# Patient Record
Sex: MALE | Race: WHITE | NOT HISPANIC OR LATINO
[De-identification: names, ages, dates, MRNs, and addresses within clinical notes are randomized per-mention and may not be internally consistent; named-entity substitution may affect disease eponyms.]

---

## 2017-12-06 PROBLEM — Z00.00 ENCOUNTER FOR PREVENTIVE HEALTH EXAMINATION: Status: ACTIVE | Noted: 2017-12-06

## 2017-12-14 ENCOUNTER — APPOINTMENT (OUTPATIENT)
Dept: PSYCHIATRY | Facility: CLINIC | Age: 47
End: 2017-12-14
Payer: OTHER GOVERNMENT

## 2017-12-14 DIAGNOSIS — F43.23 ADJUSTMENT DISORDER WITH MIXED ANXIETY AND DEPRESSED MOOD: ICD-10-CM

## 2017-12-14 DIAGNOSIS — F10.99 ALCOHOL USE, UNSPECIFIED WITH UNSPECIFIED ALCOHOL-INDUCED DISORDER: ICD-10-CM

## 2017-12-14 PROCEDURE — 90791 PSYCH DIAGNOSTIC EVALUATION: CPT

## 2022-02-05 ENCOUNTER — OUTPATIENT (OUTPATIENT)
Dept: OUTPATIENT SERVICES | Facility: HOSPITAL | Age: 52
LOS: 1 days | End: 2022-02-05
Payer: OTHER GOVERNMENT

## 2022-02-05 DIAGNOSIS — Z20.828 CONTACT WITH AND (SUSPECTED) EXPOSURE TO OTHER VIRAL COMMUNICABLE DISEASES: ICD-10-CM

## 2022-02-05 LAB — SARS-COV-2 RNA SPEC QL NAA+PROBE: SIGNIFICANT CHANGE UP

## 2022-02-05 PROCEDURE — U0003: CPT

## 2022-02-05 PROCEDURE — U0005: CPT

## 2023-06-12 ENCOUNTER — EMERGENCY (EMERGENCY)
Facility: HOSPITAL | Age: 53
LOS: 1 days | Discharge: HOME | End: 2023-06-12
Attending: STUDENT IN AN ORGANIZED HEALTH CARE EDUCATION/TRAINING PROGRAM | Admitting: STUDENT IN AN ORGANIZED HEALTH CARE EDUCATION/TRAINING PROGRAM
Payer: COMMERCIAL

## 2023-06-12 VITALS
DIASTOLIC BLOOD PRESSURE: 93 MMHG | WEIGHT: 154.98 LBS | TEMPERATURE: 98 F | OXYGEN SATURATION: 95 % | HEART RATE: 123 BPM | RESPIRATION RATE: 18 BRPM | SYSTOLIC BLOOD PRESSURE: 144 MMHG

## 2023-06-12 LAB
ANION GAP SERPL CALC-SCNC: 11 MMOL/L — SIGNIFICANT CHANGE UP (ref 5–17)
APAP SERPL-MCNC: <1 UG/ML — LOW (ref 10–30)
BASOPHILS # BLD AUTO: 0.08 K/UL — SIGNIFICANT CHANGE UP (ref 0–0.2)
BASOPHILS NFR BLD AUTO: 1 % — SIGNIFICANT CHANGE UP (ref 0–2)
BUN SERPL-MCNC: 11 MG/DL — SIGNIFICANT CHANGE UP (ref 7–23)
CALCIUM SERPL-MCNC: 9.1 MG/DL — SIGNIFICANT CHANGE UP (ref 8.4–10.5)
CHLORIDE SERPL-SCNC: 101 MMOL/L — SIGNIFICANT CHANGE UP (ref 96–108)
CO2 SERPL-SCNC: 27 MMOL/L — SIGNIFICANT CHANGE UP (ref 22–31)
CREAT SERPL-MCNC: 0.86 MG/DL — SIGNIFICANT CHANGE UP (ref 0.5–1.3)
EGFR: 105 ML/MIN/1.73M2 — SIGNIFICANT CHANGE UP
EOSINOPHIL # BLD AUTO: 0.04 K/UL — SIGNIFICANT CHANGE UP (ref 0–0.5)
EOSINOPHIL NFR BLD AUTO: 0.5 % — SIGNIFICANT CHANGE UP (ref 0–6)
ETHANOL SERPL-MCNC: 339 MG/DL — HIGH (ref 0–3)
GLUCOSE SERPL-MCNC: 103 MG/DL — HIGH (ref 70–99)
HCT VFR BLD CALC: 43.7 % — SIGNIFICANT CHANGE UP (ref 39–50)
HGB BLD-MCNC: 15 G/DL — SIGNIFICANT CHANGE UP (ref 13–17)
IMM GRANULOCYTES NFR BLD AUTO: 0.3 % — SIGNIFICANT CHANGE UP (ref 0–0.9)
LYMPHOCYTES # BLD AUTO: 2.58 K/UL — SIGNIFICANT CHANGE UP (ref 1–3.3)
LYMPHOCYTES # BLD AUTO: 32.3 % — SIGNIFICANT CHANGE UP (ref 13–44)
MCHC RBC-ENTMCNC: 34.3 GM/DL — SIGNIFICANT CHANGE UP (ref 32–36)
MCHC RBC-ENTMCNC: 34.3 PG — HIGH (ref 27–34)
MCV RBC AUTO: 100 FL — SIGNIFICANT CHANGE UP (ref 80–100)
MONOCYTES # BLD AUTO: 0.35 K/UL — SIGNIFICANT CHANGE UP (ref 0–0.9)
MONOCYTES NFR BLD AUTO: 4.4 % — SIGNIFICANT CHANGE UP (ref 2–14)
NEUTROPHILS # BLD AUTO: 4.91 K/UL — SIGNIFICANT CHANGE UP (ref 1.8–7.4)
NEUTROPHILS NFR BLD AUTO: 61.5 % — SIGNIFICANT CHANGE UP (ref 43–77)
NRBC # BLD: 0 /100 WBCS — SIGNIFICANT CHANGE UP (ref 0–0)
PLATELET # BLD AUTO: 213 K/UL — SIGNIFICANT CHANGE UP (ref 150–400)
POTASSIUM SERPL-MCNC: 3.6 MMOL/L — SIGNIFICANT CHANGE UP (ref 3.5–5.3)
POTASSIUM SERPL-SCNC: 3.6 MMOL/L — SIGNIFICANT CHANGE UP (ref 3.5–5.3)
RBC # BLD: 4.37 M/UL — SIGNIFICANT CHANGE UP (ref 4.2–5.8)
RBC # FLD: 12.9 % — SIGNIFICANT CHANGE UP (ref 10.3–14.5)
SALICYLATES SERPL-MCNC: 2.2 MG/DL — LOW (ref 3–30)
SODIUM SERPL-SCNC: 139 MMOL/L — SIGNIFICANT CHANGE UP (ref 135–145)
WBC # BLD: 7.98 K/UL — SIGNIFICANT CHANGE UP (ref 3.8–10.5)
WBC # FLD AUTO: 7.98 K/UL — SIGNIFICANT CHANGE UP (ref 3.8–10.5)

## 2023-06-12 PROCEDURE — 99285 EMERGENCY DEPT VISIT HI MDM: CPT

## 2023-06-12 PROCEDURE — 93010 ELECTROCARDIOGRAM REPORT: CPT

## 2023-06-12 RX ORDER — HALOPERIDOL DECANOATE 100 MG/ML
5 INJECTION INTRAMUSCULAR ONCE
Refills: 0 | Status: DISCONTINUED | OUTPATIENT
Start: 2023-06-12 | End: 2023-06-12

## 2023-06-12 RX ORDER — SODIUM CHLORIDE 9 MG/ML
1000 INJECTION INTRAMUSCULAR; INTRAVENOUS; SUBCUTANEOUS ONCE
Refills: 0 | Status: COMPLETED | OUTPATIENT
Start: 2023-06-12 | End: 2023-06-12

## 2023-06-12 RX ORDER — HALOPERIDOL DECANOATE 100 MG/ML
5 INJECTION INTRAMUSCULAR ONCE
Refills: 0 | Status: COMPLETED | OUTPATIENT
Start: 2023-06-12 | End: 2023-06-12

## 2023-06-12 RX ADMIN — HALOPERIDOL DECANOATE 5 MILLIGRAM(S): 100 INJECTION INTRAMUSCULAR at 15:23

## 2023-06-12 RX ADMIN — Medication 2 MILLIGRAM(S): at 15:23

## 2023-06-12 RX ADMIN — SODIUM CHLORIDE 1000 MILLILITER(S): 9 INJECTION INTRAMUSCULAR; INTRAVENOUS; SUBCUTANEOUS at 15:55

## 2023-06-12 RX ADMIN — SODIUM CHLORIDE 1000 MILLILITER(S): 9 INJECTION INTRAMUSCULAR; INTRAVENOUS; SUBCUTANEOUS at 16:55

## 2023-06-12 NOTE — ED PROVIDER NOTE - PHYSICAL EXAMINATION
Gen: Well appearing in NAD.  +AOB  ENT: oral mucosa moist   Head: atraumatic  Heart: s1/s2, RRR  Lung: CTA b/l,   Msk: No signs of injury.   Neuro: AAO x3, patient moving all extremity equally, no focal neuro deficits noted  Skin: Normal for race. No bruising   Psych: Pt uncooperative and agitated, wants to the leave the ED

## 2023-06-12 NOTE — ED PROVIDER NOTE - OTHER FREE TEXT FOR MDM DISCUSSED CASE WITH QUESTION
I called pts psychiatrist Paty Nj at [facility] [phone] ext [phone]

## 2023-06-12 NOTE — ED PROVIDER NOTE - PROGRESS NOTE DETAILS
ISRA Hernandez:  ETOH 339.  Pt was informed psych will not see him until his BAL is less than 100. Pts keeps asking for his clothes to leave. He was informed he cannot leave until psych evaluates him, which will be michael few hours based on his BAL. He became agitated and aggressive after learning this requiring sedation spoke with telepsych, will keep untilmorning so that collateral from pt psychiatrist can be obtained to make final disposition. Pt resting calmly now and sleeping, he denies si/hi, will give ativan for tremors TABITHA Reed DO

## 2023-06-12 NOTE — ED PROVIDER NOTE - NSFOLLOWUPINSTRUCTIONS_ED_ALL_ED_FT
Suicidal Feelings: How to Help Yourself  Suicide is when you end your own life. Suicidal ideation includes expressing thoughts about, or a preoccupation with, ending your own life. There are many things you can do to help yourself feel better when struggling with these feelings. Many services and people are available to support you and others who struggle with similar feelings.    If you ever feel like you may hurt yourself or others, or have thoughts about taking your own life, get help right away. To get help:  Go to your nearest emergency department.  Call your local emergency services (081 in the [place]  Call the [facility] services helpline (099 in the [place]  Call or text a suicide hotline to speak with a trained counselor. The following suicide hotlines are available in the [place]:  4-723-194-TALK (1-681.524.1512 or 180 in the [place]  7-205-PPXVDRC (1-278.826.2846).  Text 476497. This is the Crisis Text Line in the [place]  1-640.157.3130. This is a hotline for Faroese speakers.  1-707.365.8316. This is a hotline for TTY users.  2-322-9-U-SID (1-813.541.3646). This is a hotline for lesbian, brown, bisexual, transgender, or questioning youth.  For a list of hotlines in [place], visit suicide.org/hotlines/international/poypaz-kltiqwv-pkfcnoxj.html  Contact a crisis center or a local suicide prevention center. To find a crisis center or suicide prevention center:  Call your local hospital, clinic, community service organization, mental health center, social service provider, or health department. Ask for help with connecting to a crisis center.  For a list of crisis centers in the [place], visit: suicidepreventionlifeline.org  For a list of crisis centers in [place], visit: suicideprevention.ca  How to help yourself feel better  A teen talking with an adult.  Promise yourself that you will not do anything bad or extreme when you have suicidal feelings. Remember the times you have felt hopeful.  Many people have gotten through suicidal thoughts and feelings, and you can too.  If you have had these feelings before, remind yourself that you can get through them again.  Let family, friends, teachers, or counselors know how you are feeling. Do not separate yourself from those who care about you and want to help you.  Talk with someone every day, even if you do not feel like talking to anyone or being with other people.  Face-to-face conversation is best to help them understand your feelings.  Contact a mental health care provider and work with this person regularly.  Make a safety plan that you can follow during a crisis.  Include phone numbers of suicide prevention hotlines, mental health professionals, and trusted friends and family members you can call during an emergency.  Save these numbers on your phone.  If you are thinking of taking a lot of medicine, give your medicine to someone who can give it to you as prescribed.  If you are on antidepressants and are concerned you will overdose, tell your health care provider so that he or she can give you safer medicines.  Try to stick to your routines and follow a schedule every day. Make self-care a priority.  Make a list of realistic goals, and cross them off when you achieve them. Accomplishments can give you a sense of worth.  Wait until you are feeling better before doing things that you find difficult or unpleasant.  Do things that you have always enjoyed to take your mind off your feelings.  Try reading a book, or listening to or playing music.  Spending time outside, in nature, may help you feel better.  Follow these instructions at home:  A sign asking the reader not to drink beer, wine, or hard liquor.   Visit your primary health care provider every year for a physical and a mental health checkup.  Take over-the-counter and prescription medicines only as told by your health care provider.  Ask your health care provider about the possible side effects of any medicines you are taking.  Ask your health care provider about whether suicidal ideation is a possible side effect of any of your medicines.  Learn about suicidal ideation and what increases the risk for the development of suicidal thoughts.  Eat a well-balanced diet, and eat regular meals.  Get plenty of rest.  Exercise if you are able. Just 30 minutes of exercise each day can help you feel better.  Keep your living space well lit.  Do not use alcohol or drugs. Remove these substances from your home.  General recommendations  Remove weapons, poisons, knives, and other deadly items from your home.  Work with a mental health care provider as needed.  When you are feeling well, write yourself a letter with tips and support that you can read when you are not feeling well.  Remember that life's difficulties can be sorted out with help. Conditions can be treated, and you can learn behaviors and ways of thinking that will help you.  Work with your health care provider or counselor to learn ways of coping with your thoughts and feelings.  Where to find more information  National Suicide Prevention Lifeline: www.suicidepreventionlifeline.org  Hopeline: www.hopeline.[employer] [facility] Directions  American Foundation for Suicide Prevention: www.afsp.org  The Sid Project (for lesbian, brown, bisexual, transgender, or questioning youth): www.thetrevorproject.org  National [place] of Mental Health: www.nimh.nih.gov/health/topics/suicide-prevention  Suicide Prevention Resources: afsp.org/suicide-prevention-resources  Contact a health care provider if:  You feel as though you are a burden to others.  You feel agitated, angry, vengeful, or have extreme mood swings.  You have withdrawn from family and friends.  You are frequently using drugs or alcohol.  Get help right away if:  You are talking about suicide or wishing to die.  You start making plans for how to commit suicide.  You feel that you have no reason to live.  You start making plans for putting your affairs in order, saying goodbye, or giving your possessions away.  You feel guilt, shame, or unbearable pain, and it seems like there is no way out.  You are engaging in risky behaviors that could lead to death.  If you have any of these thoughts or symptoms, get help right away:  Go to your nearest emergency department or crisis center.  Call emergency services (911 in the [place]  Call or text a suicide crisis helpline.  Summary  Suicide is when you take your own life. Suicidal feelings are thoughts about ending your own life.  Promise yourself that you will not do anything bad or extreme when you have suicidal feelings.  Let family, friends, teachers, or counselors know how you are feeling.  Get help right away if you start making plans for how to commit suicide.  This information is not intended to replace advice given to you by your health care provider. Make sure you discuss any questions you have with your health care provider.

## 2023-06-12 NOTE — ED PROVIDER NOTE - NS ED ATTENDING STATEMENT MOD
This was a shared visit with the MARTINE. I reviewed and verified the documentation and independently performed the documented:

## 2023-06-12 NOTE — ED ADULT NURSE REASSESSMENT NOTE - NS ED NURSE REASSESS COMMENT FT1
Patient undress and placed in gown all belongings placed in the belongings closet. Patient on 1:1. Valuables collected and given to security in envelope # [id number].

## 2023-06-12 NOTE — ED ADULT NURSE NOTE - OBJECTIVE STATEMENT
Patient BIBEMS from home for possible SI. As per PD, patients psychiatrist called for a safety concern. Patient denies SI/HI, visual, auditory ad tactile hallucinations. Patient states "im confused the police came into my house and brought me here I want to leave".

## 2023-06-12 NOTE — ED PROVIDER NOTE - PATIENT PORTAL LINK FT
You can access the FollowMyHealth Patient Portal offered by [facility] by registering at the following website: [email or web address]. By joining [employer]’s FollowMyHealth portal, you will also be able to view your health information using other applications (apps) compatible with our system.
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
## 2023-06-12 NOTE — ED ADULT NURSE REASSESSMENT NOTE - NS ED NURSE REASSESS COMMENT FT1
Spoke with patient's girlfriend (Migdalia) in ED waiting room. Girlfriend upset about events that happened with PD. ER phone number provided to Migdalia.

## 2023-06-12 NOTE — ED ADULT TRIAGE NOTE - CHIEF COMPLAINT QUOTE
Pt BIBA from home for possible SI. As per PD patient's psychiatrist called for concern safety of pt. Pt denies any SI/HI at this time.

## 2023-06-12 NOTE — ED PROVIDER NOTE - OBJECTIVE STATEMENT
52-year-old male with past medical history of anxiety and depression, ETOH abuse Was brought in by EMS and [facility] police for possible suicidal ideation.  Per EMS patient's psychiatrist called the police for safety concerns.  Patient reports he was at his girlfriend's house when the police showed up and brought him in. He denies any SI, HI or all other medical complaints.  Reports this is the second time his psychiatrist called the police on him.  He follows with psychiatrist Kathi Nj at the [facility].

## 2023-06-12 NOTE — ED ADULT NURSE REASSESSMENT NOTE - NS ED NURSE REASSESS COMMENT FT1
Pt's girlfriend Migdalia called to speak with patient. Pt sleeping at this time. Girlfriend made aware. Migdalia's phone number: [phone]. Migdalia [facility] hospital personnel may contact her at any time for collateral information regarding patient and updates.

## 2023-06-13 VITALS
TEMPERATURE: 99 F | RESPIRATION RATE: 18 BRPM | OXYGEN SATURATION: 98 % | SYSTOLIC BLOOD PRESSURE: 157 MMHG | HEART RATE: 97 BPM | DIASTOLIC BLOOD PRESSURE: 88 MMHG

## 2023-06-13 DIAGNOSIS — F10.90 ALCOHOL USE, UNSPECIFIED, UNCOMPLICATED: ICD-10-CM

## 2023-06-13 PROCEDURE — 96372 THER/PROPH/DIAG INJ SC/IM: CPT | Mod: XU

## 2023-06-13 PROCEDURE — 80048 BASIC METABOLIC PNL TOTAL CA: CPT

## 2023-06-13 PROCEDURE — 99285 EMERGENCY DEPT VISIT HI MDM: CPT | Mod: 25

## 2023-06-13 PROCEDURE — 85025 COMPLETE CBC W/AUTO DIFF WBC: CPT

## 2023-06-13 PROCEDURE — 93005 ELECTROCARDIOGRAM TRACING: CPT

## 2023-06-13 PROCEDURE — 36415 COLL VENOUS BLD VENIPUNCTURE: CPT

## 2023-06-13 PROCEDURE — 80307 DRUG TEST PRSMV CHEM ANLYZR: CPT

## 2023-06-13 PROCEDURE — 96360 HYDRATION IV INFUSION INIT: CPT

## 2023-06-13 PROCEDURE — 90792 PSYCH DIAG EVAL W/MED SRVCS: CPT | Mod: 95

## 2023-06-13 RX ADMIN — Medication 2 MILLIGRAM(S): at 05:50

## 2023-06-13 NOTE — ED BEHAVIORAL HEALTH ASSESSMENT NOTE - RISK ASSESSMENT
risk factors: substance use, psychosocial stressors, limited social supports, impulsivity  protective: no past self-harm, engaged in care, employed, motivated to live
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
## 2023-06-13 NOTE — ED BEHAVIORAL HEALTH NOTE - BEHAVIORAL HEALTH NOTE
========================    FOR EACH COLLATERAL    ========================    NAME: unknown     NUMBER: [phone]    RELATIONSHIP: girlfriend     RELIABILITY: unknown     COMMENTS: BTCM attempted to reach collateral via number above as per the  ED - phone line not in service.

## 2023-06-13 NOTE — ED BEHAVIORAL HEALTH ASSESSMENT NOTE - SUMMARY
The patient is a 51 yo male, domiciled alone, employed, a  vet ([id number]), with three children and undergoing ongoing custody battles, no PMH, psych hx of alcohol use disorder, one prior hospitalization for 5 days at [facility] two weeks ago, hx of inpatient rehab, no known suicide attempts, BIB EMS reportedly activated by his outpatient PsyD provider Doreen Nj at [facility] (as per EMS report), due to concern of possible SI.    While patient is denying all symptoms, adamantly denying SI and reporting he does not even recall what he may have said to his provider in the context of intoxication, and on exam hs appearance/affect are congruent with his answers, given that this is the second time recently his provider activated 911 and we do not have sufficient details of what prompted the concern, collateral is needed before we can determine disposition of patient.    Plan:  - hold in ED for collateral from outpatient psych provider Doreen Nj at [facility]  - etoh withdrawal management as per ED  - for acute/severe agitation, Haldol 5mg + Ativan 2mg + Benadryl 50mg q6h PRN
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
## 2023-06-13 NOTE — ED ADULT NURSE REASSESSMENT NOTE - NS ED NURSE REASSESS COMMENT FT1
Belongings given to patient at time of discharge. Pt also provided with 2 sandwiches and can of soda prior to departure.

## 2023-06-13 NOTE — ED BEHAVIORAL HEALTH NOTE - BEHAVIORAL HEALTH NOTE
===================     PRE-HOSPITAL COURSE     ===================     SOURCE: RN and [employer] ED documentation      DETAILS: BIB EMS & PD     ============     ED COURSE     ============     SOURCE: RN and [employer] ED documentation      ARRIVAL: BIB EMS & PD     BELONGINGS: No belongings of note. All belongings were provided to hospital security, and patient currently in a gown with a 1:1 staff member.     BEHAVIOR:  Per MD pt has been sleeping. Per chart, patient's psychiatrist called the police for safety concerns.  Per chart, Patient reports he was at his girlfriend's house when the police showed up and brought him in. Per chart, pt denies any SI, HI or all other medical complaints.     TREATMENT: No medications given in the ED. RN reports pt was given Ketamine 250 mg IV by EMS     VISITORS: No one at bedside

## 2023-06-13 NOTE — CHART NOTE - NSCHARTNOTEFT_GEN_A_CORE
SW met with pt as requested by MD and Telepsych regarding patient interest in substance abuse treatment. SW offered assistance in providing resources and/or referrals to outpatient and/or inpatient treatment programs, however, pt declined assistance with care coordination to treatment providers for his substance abuse and/or denied resources offered and discussed by ZHAO. Patient reported he wants to resume attending his adult treatment program 1x week at the [facility] and will continue to see his therapist weekly. No further SW needs identified at this time, ZHAO advised pt to f/u with SW if he is interested in any community resources.

## 2023-06-13 NOTE — ED BEHAVIORAL HEALTH ASSESSMENT NOTE - NSSUICPROTFACT_PSY_ALL_CORE
Responsibility to children, family, or others/Identifies reasons for living/Cultural, spiritual and/or moral attitudes against suicide/Positive therapeutic relationships

## 2023-06-13 NOTE — ED BEHAVIORAL HEALTH NOTE - BEHAVIORAL HEALTH NOTE
Chart reviewed, received signout, patient reassessed   - overnight received ativan 2mg IV at 550am for withdrawal    Collateral obtained from girlfrienedward Negron at [phone] - she has known patient for 5 years, dating for the last year, states that he has never tried to hurt or kill himself or anyone else, has never said anything about suicide to her. She states that he does have a problem with alcohol that he is aware of and working on, that he can drink 6-8 24oz beers per day that ebbs and flows, denies other drugs. She states that she was with him when he called his psychologist yesterday, said that he expressed his frustration that he had to wait for 7 hours for a program that she had referred him to, denies that she heard him say anything about suicide, states that he was just watching tv when the PD came. She states that she has zero safety concerns about him and that she thinks he is safe to leave the ED.    On interview today patient states that he feels a little shaky from withdrawal but denies any suicide ideation, denies that he would ever kill himself, did not want psychiatric admission but stated that he wanted detox, was able to safety plan. States that he drinks between 48-96oz of beer per day, denies prior seizures. States that he speaks with his therapist weekly. States that he thinks he expressed his frustration to his therapist yesterday and that is why he is here.     MSE: - age appearing male sitting up in bed, no acute distress, fair eye contact, no signs of internal preoccupation or abnormal movements, fair eye contact. Speech prosodic, normal volume, non pressured. Mood "a little shaky" affect congruent, restricted range, predominantly fatigued. Denies suicide ideation, no HI, no auditory, visual, or tactile hallucinations, no paranoia, linear thought process. Alert, AAOx4    A/P   52M,  vet , living alone, has children ages 1, 7, 12, w ongoing custody trejo, works as graphics technician, psych hx of etoh use disorder, one prior hosp x 5 days at [facility] 2 weeks, ago, hx of inpatient rehab, no known SA, now BIBEMS activated by psychiatrist for SI,  on arrival. Patient has consistently denied suicide ideation throughout his stay and collateral also denies that he has ever said anything about suicide or that he has ever tried to hurt or kill himself. He does admit to problematic alcohol use (girlfriend corroborates this) and wants help in the form of detox, and given degree of etoh intake it would appear that this would be a more appropriate treatment intervention than involuntary psychiatric hospitalization at this time, and would not meet criteria for involuntary psychiatric admission as he does not appear to be an imminent threat to himself or anyone else. As such he is psychiatrically cleared for discharge      - Psychiatrically cleared   - Appreciate  help in detox referral Chart reviewed, received signout, patient reassessed   - overnight received ativan 2mg IV at 550am for withdrawal    Collateral obtained from girlfrienedward Negron at [phone] - she has known patient for 5 years, dating for the last year, states that he has never tried to hurt or kill himself or anyone else, has never said anything about suicide to her. She states that he does have a problem with alcohol that he is aware of and working on, that he can drink 6-8 24oz beers per day that ebbs and flows, denies other drugs. She states that she was with him when he called his psychologist yesterday, said that he expressed his frustration that he had to wait for 7 hours for a program that she had referred him to, denies that she heard him say anything about suicide, states that he was just watching tv when the PD came. She states that she has zero safety concerns about him and that she thinks he is safe to leave the ED.    On interview today patient states that he feels a little shaky from withdrawal but denies any suicide ideation, denies that he would ever kill himself, did not want psychiatric admission but stated that he wanted detox, was able to safety plan. States that he drinks between 48-96oz of beer per day, denies prior seizures. States that he speaks with his therapist weekly. States that he thinks he expressed his frustration to his therapist yesterday and that is why he is here.     MSE: - age appearing male sitting up in bed, no acute distress, fair eye contact, no signs of internal preoccupation or abnormal movements, fair eye contact. Speech prosodic, normal volume, non pressured. Mood "a little shaky" affect congruent, restricted range, predominantly fatigued. Denies suicide ideation, no HI, no auditory, visual, or tactile hallucinations, no paranoia, linear thought process. 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 To be clear, patient has several risk factors for suicide that place him at elevated risk (etoh abuse, stress from custody battles, age and gender). However, patient has consistently denied suicide ideation throughout his stay and collateral also denies that he has ever said anything about suicide or that he has ever tried to hurt or kill himself. He does admit to problematic alcohol use (girlfriend corroborates this) and wants help in the form of detox, and given degree of etoh intake it would appear that this would be a more appropriate treatment intervention than involuntary psychiatric hospitalization at this time, and would not meet criteria for involuntary psychiatric admission as he does not appear to be an imminent threat to himself or anyone else. As such he is psychiatrically cleared for discharge    Suicide risk assessment: risk factors include etoh abuse, , white male, , stress from ongoing custody trejo. Protective factors include lack of prior attempts, lack of current suicide ideation, lack of psychosis, stable housing, employment, lack of access to firearms (per chart and gf these were removed recently). Overall he remains at chronically elevated risk of suicide but this risk is mitigated by the aforementioned protective factors and they therefore do not appear to be at imminently increased risk of suicide. Furthermore alcohol appears to be a primer  of his pathology and as such he would not be appropriate for inpatient psychiatric unit. He is therefore appropriate for discharge..       - Psychiatrically cleared   - Appreciate SW help in detox referral

## 2023-09-25 ENCOUNTER — APPOINTMENT (OUTPATIENT)
Dept: NEUROLOGY | Facility: CLINIC | Age: 53
End: 2023-09-25
Payer: OTHER GOVERNMENT

## 2023-09-25 VITALS
HEART RATE: 99 BPM | WEIGHT: 137 LBS | BODY MASS INDEX: 19.61 KG/M2 | SYSTOLIC BLOOD PRESSURE: 114 MMHG | HEIGHT: 70 IN | DIASTOLIC BLOOD PRESSURE: 82 MMHG

## 2023-09-25 DIAGNOSIS — M21.372 FOOT DROP, LEFT FOOT: ICD-10-CM

## 2023-09-25 PROCEDURE — 99205 OFFICE O/P NEW HI 60 MIN: CPT

## 2023-10-04 ENCOUNTER — APPOINTMENT (OUTPATIENT)
Dept: NEUROLOGY | Facility: CLINIC | Age: 53
End: 2023-10-04
Payer: OTHER GOVERNMENT

## 2023-10-04 DIAGNOSIS — G57.30 LESION OF LATERAL POPLITEAL NERVE, UNSPECIFIED LOWER LIMB: ICD-10-CM

## 2023-10-04 DIAGNOSIS — G62.9 POLYNEUROPATHY, UNSPECIFIED: ICD-10-CM

## 2023-10-04 PROCEDURE — 95910 NRV CNDJ TEST 7-8 STUDIES: CPT

## 2023-10-04 PROCEDURE — 95886 MUSC TEST DONE W/N TEST COMP: CPT

## 2023-10-05 PROBLEM — G57.30: Status: ACTIVE | Noted: 2023-10-05

## 2023-10-05 PROBLEM — G62.9 POLYNEUROPATHY: Status: ACTIVE | Noted: 2023-10-05

## 2023-10-23 ENCOUNTER — APPOINTMENT (OUTPATIENT)
Dept: NEUROLOGY | Facility: CLINIC | Age: 53
End: 2023-10-23

## 2023-12-02 ENCOUNTER — NON-APPOINTMENT (OUTPATIENT)
Age: 53
End: 2023-12-02

## 2023-12-12 ENCOUNTER — INPATIENT (INPATIENT)
Facility: HOSPITAL | Age: 53
LOS: 1 days | Discharge: LEFT BEFORE DISCHARGE | DRG: 894 | End: 2023-12-14
Attending: INTERNAL MEDICINE | Admitting: INTERNAL MEDICINE
Payer: MEDICAID

## 2023-12-12 VITALS
HEART RATE: 126 BPM | HEIGHT: 71 IN | OXYGEN SATURATION: 96 % | TEMPERATURE: 97 F | WEIGHT: 136.91 LBS | SYSTOLIC BLOOD PRESSURE: 122 MMHG | RESPIRATION RATE: 15 BRPM | DIASTOLIC BLOOD PRESSURE: 81 MMHG

## 2023-12-12 DIAGNOSIS — F10.931 ALCOHOL USE, UNSPECIFIED WITH WITHDRAWAL DELIRIUM: ICD-10-CM

## 2023-12-12 LAB
ANION GAP SERPL CALC-SCNC: 13 MMOL/L — SIGNIFICANT CHANGE UP (ref 5–17)
ANION GAP SERPL CALC-SCNC: 13 MMOL/L — SIGNIFICANT CHANGE UP (ref 5–17)
BASE EXCESS BLDA CALC-SCNC: 3.3 MMOL/L — HIGH (ref -2–3)
BASE EXCESS BLDA CALC-SCNC: 3.3 MMOL/L — HIGH (ref -2–3)
BASOPHILS # BLD AUTO: 0.05 K/UL — SIGNIFICANT CHANGE UP (ref 0–0.2)
BASOPHILS # BLD AUTO: 0.05 K/UL — SIGNIFICANT CHANGE UP (ref 0–0.2)
BASOPHILS NFR BLD AUTO: 1.3 % — SIGNIFICANT CHANGE UP (ref 0–2)
BASOPHILS NFR BLD AUTO: 1.3 % — SIGNIFICANT CHANGE UP (ref 0–2)
BLOOD GAS COMMENTS ARTERIAL: SIGNIFICANT CHANGE UP
BLOOD GAS COMMENTS ARTERIAL: SIGNIFICANT CHANGE UP
BUN SERPL-MCNC: 6 MG/DL — LOW (ref 7–23)
BUN SERPL-MCNC: 6 MG/DL — LOW (ref 7–23)
CALCIUM SERPL-MCNC: 9.3 MG/DL — SIGNIFICANT CHANGE UP (ref 8.4–10.5)
CALCIUM SERPL-MCNC: 9.3 MG/DL — SIGNIFICANT CHANGE UP (ref 8.4–10.5)
CHLORIDE SERPL-SCNC: 101 MMOL/L — SIGNIFICANT CHANGE UP (ref 96–108)
CHLORIDE SERPL-SCNC: 101 MMOL/L — SIGNIFICANT CHANGE UP (ref 96–108)
CO2 BLDA-SCNC: 28 MMOL/L — HIGH (ref 19–24)
CO2 BLDA-SCNC: 28 MMOL/L — HIGH (ref 19–24)
CO2 SERPL-SCNC: 27 MMOL/L — SIGNIFICANT CHANGE UP (ref 22–31)
CO2 SERPL-SCNC: 27 MMOL/L — SIGNIFICANT CHANGE UP (ref 22–31)
CREAT SERPL-MCNC: 0.6 MG/DL — SIGNIFICANT CHANGE UP (ref 0.5–1.3)
CREAT SERPL-MCNC: 0.6 MG/DL — SIGNIFICANT CHANGE UP (ref 0.5–1.3)
EGFR: 116 ML/MIN/1.73M2 — SIGNIFICANT CHANGE UP
EGFR: 116 ML/MIN/1.73M2 — SIGNIFICANT CHANGE UP
EOSINOPHIL # BLD AUTO: 0.07 K/UL — SIGNIFICANT CHANGE UP (ref 0–0.5)
EOSINOPHIL # BLD AUTO: 0.07 K/UL — SIGNIFICANT CHANGE UP (ref 0–0.5)
EOSINOPHIL NFR BLD AUTO: 1.9 % — SIGNIFICANT CHANGE UP (ref 0–6)
EOSINOPHIL NFR BLD AUTO: 1.9 % — SIGNIFICANT CHANGE UP (ref 0–6)
ETHANOL SERPL-MCNC: 206 MG/DL — HIGH (ref 0–3)
ETHANOL SERPL-MCNC: 206 MG/DL — HIGH (ref 0–3)
GAS PNL BLDA: SIGNIFICANT CHANGE UP
GAS PNL BLDA: SIGNIFICANT CHANGE UP
GLUCOSE SERPL-MCNC: 147 MG/DL — HIGH (ref 70–99)
GLUCOSE SERPL-MCNC: 147 MG/DL — HIGH (ref 70–99)
HCO3 BLDA-SCNC: 27 MMOL/L — SIGNIFICANT CHANGE UP (ref 21–28)
HCO3 BLDA-SCNC: 27 MMOL/L — SIGNIFICANT CHANGE UP (ref 21–28)
HCT VFR BLD CALC: 40.1 % — SIGNIFICANT CHANGE UP (ref 39–50)
HCT VFR BLD CALC: 40.1 % — SIGNIFICANT CHANGE UP (ref 39–50)
HGB BLD-MCNC: 13.8 G/DL — SIGNIFICANT CHANGE UP (ref 13–17)
HGB BLD-MCNC: 13.8 G/DL — SIGNIFICANT CHANGE UP (ref 13–17)
HOROWITZ INDEX BLDA+IHG-RTO: 21 — SIGNIFICANT CHANGE UP
HOROWITZ INDEX BLDA+IHG-RTO: 21 — SIGNIFICANT CHANGE UP
IMM GRANULOCYTES NFR BLD AUTO: 0.3 % — SIGNIFICANT CHANGE UP (ref 0–0.9)
IMM GRANULOCYTES NFR BLD AUTO: 0.3 % — SIGNIFICANT CHANGE UP (ref 0–0.9)
LYMPHOCYTES # BLD AUTO: 1.11 K/UL — SIGNIFICANT CHANGE UP (ref 1–3.3)
LYMPHOCYTES # BLD AUTO: 1.11 K/UL — SIGNIFICANT CHANGE UP (ref 1–3.3)
LYMPHOCYTES # BLD AUTO: 29.5 % — SIGNIFICANT CHANGE UP (ref 13–44)
LYMPHOCYTES # BLD AUTO: 29.5 % — SIGNIFICANT CHANGE UP (ref 13–44)
MCHC RBC-ENTMCNC: 34.4 GM/DL — SIGNIFICANT CHANGE UP (ref 32–36)
MCHC RBC-ENTMCNC: 34.4 GM/DL — SIGNIFICANT CHANGE UP (ref 32–36)
MCHC RBC-ENTMCNC: 34.7 PG — HIGH (ref 27–34)
MCHC RBC-ENTMCNC: 34.7 PG — HIGH (ref 27–34)
MCV RBC AUTO: 100.8 FL — HIGH (ref 80–100)
MCV RBC AUTO: 100.8 FL — HIGH (ref 80–100)
MONOCYTES # BLD AUTO: 0.29 K/UL — SIGNIFICANT CHANGE UP (ref 0–0.9)
MONOCYTES # BLD AUTO: 0.29 K/UL — SIGNIFICANT CHANGE UP (ref 0–0.9)
MONOCYTES NFR BLD AUTO: 7.7 % — SIGNIFICANT CHANGE UP (ref 2–14)
MONOCYTES NFR BLD AUTO: 7.7 % — SIGNIFICANT CHANGE UP (ref 2–14)
NEUTROPHILS # BLD AUTO: 2.23 K/UL — SIGNIFICANT CHANGE UP (ref 1.8–7.4)
NEUTROPHILS # BLD AUTO: 2.23 K/UL — SIGNIFICANT CHANGE UP (ref 1.8–7.4)
NEUTROPHILS NFR BLD AUTO: 59.3 % — SIGNIFICANT CHANGE UP (ref 43–77)
NEUTROPHILS NFR BLD AUTO: 59.3 % — SIGNIFICANT CHANGE UP (ref 43–77)
NRBC # BLD: 0 /100 WBCS — SIGNIFICANT CHANGE UP (ref 0–0)
NRBC # BLD: 0 /100 WBCS — SIGNIFICANT CHANGE UP (ref 0–0)
PCO2 BLDA: 36 MMHG — SIGNIFICANT CHANGE UP (ref 35–48)
PCO2 BLDA: 36 MMHG — SIGNIFICANT CHANGE UP (ref 35–48)
PH BLDA: 7.48 — HIGH (ref 7.35–7.45)
PH BLDA: 7.48 — HIGH (ref 7.35–7.45)
PLATELET # BLD AUTO: 100 K/UL — LOW (ref 150–400)
PLATELET # BLD AUTO: 100 K/UL — LOW (ref 150–400)
PO2 BLDA: 92 MMHG — SIGNIFICANT CHANGE UP (ref 83–108)
PO2 BLDA: 92 MMHG — SIGNIFICANT CHANGE UP (ref 83–108)
POTASSIUM SERPL-MCNC: 3.6 MMOL/L — SIGNIFICANT CHANGE UP (ref 3.5–5.3)
POTASSIUM SERPL-MCNC: 3.6 MMOL/L — SIGNIFICANT CHANGE UP (ref 3.5–5.3)
POTASSIUM SERPL-SCNC: 3.6 MMOL/L — SIGNIFICANT CHANGE UP (ref 3.5–5.3)
POTASSIUM SERPL-SCNC: 3.6 MMOL/L — SIGNIFICANT CHANGE UP (ref 3.5–5.3)
RBC # BLD: 3.98 M/UL — LOW (ref 4.2–5.8)
RBC # BLD: 3.98 M/UL — LOW (ref 4.2–5.8)
RBC # FLD: 12.4 % — SIGNIFICANT CHANGE UP (ref 10.3–14.5)
RBC # FLD: 12.4 % — SIGNIFICANT CHANGE UP (ref 10.3–14.5)
SAO2 % BLDA: 98.8 % — HIGH (ref 94–98)
SAO2 % BLDA: 98.8 % — HIGH (ref 94–98)
SODIUM SERPL-SCNC: 141 MMOL/L — SIGNIFICANT CHANGE UP (ref 135–145)
SODIUM SERPL-SCNC: 141 MMOL/L — SIGNIFICANT CHANGE UP (ref 135–145)
WBC # BLD: 3.76 K/UL — LOW (ref 3.8–10.5)
WBC # BLD: 3.76 K/UL — LOW (ref 3.8–10.5)
WBC # FLD AUTO: 3.76 K/UL — LOW (ref 3.8–10.5)
WBC # FLD AUTO: 3.76 K/UL — LOW (ref 3.8–10.5)

## 2023-12-12 PROCEDURE — 99285 EMERGENCY DEPT VISIT HI MDM: CPT

## 2023-12-12 PROCEDURE — 99223 1ST HOSP IP/OBS HIGH 75: CPT

## 2023-12-12 RX ORDER — ACETAMINOPHEN 500 MG
650 TABLET ORAL EVERY 6 HOURS
Refills: 0 | Status: DISCONTINUED | OUTPATIENT
Start: 2023-12-12 | End: 2023-12-14

## 2023-12-12 RX ORDER — DEXTROSE MONOHYDRATE, SODIUM CHLORIDE, AND POTASSIUM CHLORIDE 50; .745; 4.5 G/1000ML; G/1000ML; G/1000ML
1000 INJECTION, SOLUTION INTRAVENOUS
Refills: 0 | Status: DISCONTINUED | OUTPATIENT
Start: 2023-12-12 | End: 2023-12-13

## 2023-12-12 RX ORDER — LANOLIN ALCOHOL/MO/W.PET/CERES
3 CREAM (GRAM) TOPICAL AT BEDTIME
Refills: 0 | Status: DISCONTINUED | OUTPATIENT
Start: 2023-12-12 | End: 2023-12-14

## 2023-12-12 RX ORDER — MAGNESIUM SULFATE 500 MG/ML
1 VIAL (ML) INJECTION ONCE
Refills: 0 | Status: COMPLETED | OUTPATIENT
Start: 2023-12-12 | End: 2023-12-12

## 2023-12-12 RX ORDER — FOLIC ACID 0.8 MG
1 TABLET ORAL ONCE
Refills: 0 | Status: COMPLETED | OUTPATIENT
Start: 2023-12-12 | End: 2023-12-12

## 2023-12-12 RX ORDER — SODIUM CHLORIDE 9 MG/ML
1000 INJECTION INTRAMUSCULAR; INTRAVENOUS; SUBCUTANEOUS ONCE
Refills: 0 | Status: COMPLETED | OUTPATIENT
Start: 2023-12-12 | End: 2023-12-12

## 2023-12-12 RX ORDER — DIAZEPAM 5 MG
5 TABLET ORAL ONCE
Refills: 0 | Status: DISCONTINUED | OUTPATIENT
Start: 2023-12-12 | End: 2023-12-12

## 2023-12-12 RX ORDER — FOLIC ACID 0.8 MG
1 TABLET ORAL DAILY
Refills: 0 | Status: DISCONTINUED | OUTPATIENT
Start: 2023-12-13 | End: 2023-12-14

## 2023-12-12 RX ORDER — THIAMINE MONONITRATE (VIT B1) 100 MG
100 TABLET ORAL DAILY
Refills: 0 | Status: DISCONTINUED | OUTPATIENT
Start: 2023-12-13 | End: 2023-12-14

## 2023-12-12 RX ORDER — ONDANSETRON 8 MG/1
4 TABLET, FILM COATED ORAL EVERY 8 HOURS
Refills: 0 | Status: DISCONTINUED | OUTPATIENT
Start: 2023-12-12 | End: 2023-12-14

## 2023-12-12 RX ORDER — THIAMINE MONONITRATE (VIT B1) 100 MG
100 TABLET ORAL ONCE
Refills: 0 | Status: COMPLETED | OUTPATIENT
Start: 2023-12-12 | End: 2023-12-12

## 2023-12-12 RX ADMIN — Medication 100 GRAM(S): at 16:04

## 2023-12-12 RX ADMIN — Medication 2 MILLIGRAM(S): at 18:37

## 2023-12-12 RX ADMIN — Medication 5 MILLIGRAM(S): at 16:07

## 2023-12-12 RX ADMIN — Medication 100 MILLIGRAM(S): at 16:18

## 2023-12-12 RX ADMIN — Medication 1 MILLIGRAM(S): at 16:34

## 2023-12-12 RX ADMIN — Medication 2 MILLIGRAM(S): at 22:23

## 2023-12-12 RX ADMIN — Medication 50 MILLIGRAM(S): at 20:50

## 2023-12-12 RX ADMIN — SODIUM CHLORIDE 1000 MILLILITER(S): 9 INJECTION INTRAMUSCULAR; INTRAVENOUS; SUBCUTANEOUS at 16:04

## 2023-12-12 NOTE — ED ADULT NURSE NOTE - CHIEF COMPLAINT QUOTE
Care Transitions Outreach Attempt    Call within 2 business days of discharge: Yes   Attempted to reach patient for transitions of care follow up. Unable to reach patient. Left hipaa compliant message updating pt to follow up with pcp regarding anything further. Ctn info given and will sign off     Patient: Kelly Armas Patient : 1937 MRN: [id number]    Last Discharge [address]       Date Complaint Diagnosis Description Type Department Provider    10/7/22 Dizziness Dizziness . .. ED to Hosp-Admission (Discharged) (ADMITTED) [facility], DO; Mary Lawson. .. Was this an external facility discharge?  No Discharge Facility: [facility] 10/7-10/14/222 DELIRIUM    Noted following upcoming appointments from discharge chart review:   [facility] Shiv Rivera follow up appointment(s):   Future Appointments   Date Time Provider Tay Singh   2022  8:40 AM BETINA Rock - CNS [facility] Neuro Neurology -   [facility]2022 10[facility]     [facility] follow up appointment(s): na
states he is here for alcohol detox, last drink at 5am

## 2023-12-12 NOTE — ED ADULT NURSE NOTE - NSFALLUNIVINTERV_ED_ALL_ED
Bed/Stretcher in lowest position, wheels locked, appropriate side rails in place/Call bell, personal items and telephone in reach/Instruct patient to call for assistance before getting out of bed/chair/stretcher/Non-slip footwear applied when patient is off stretcher/[place] to call system/Physically safe environment - no spills, clutter or unnecessary equipment/Purposeful proactive rounding/Room/bathroom lighting operational, light cord in reach Bed/Stretcher in lowest position, wheels locked, appropriate side rails in place/Call bell, personal items and telephone in reach/Instruct patient to call for assistance before getting out of bed/chair/stretcher/Non-slip footwear applied when patient is off stretcher/[place] to call system/Physically safe environment - no spills, clutter or unnecessary equipment/Purposeful proactive rounding/Room/bathroom lighting operational, light cord in reach

## 2023-12-12 NOTE — ED PROVIDER NOTE - PHYSICAL EXAMINATION
General:     NAD, well-nourished, well-appearing  Head:     NC/AT, EOMI, oral mucosa moist  Neck:     trachea midline  Lungs:     CTA b/l, no w/r/r  CVS:     S1S2, RRR, no m/g/r  Abd:     +BS, s/nt/nd, no organomegaly  Ext:    2+ radial and pedal pulses, no c/c/e  Neuro: AAOx3, no sensory/motor deficits  Mildly injected.  Anxious shaking nauseous

## 2023-12-12 NOTE — ED PROVIDER NOTE - CLINICAL SUMMARY MEDICAL DECISION MAKING FREE TEXT BOX
53-year-old male history of alcohol abuse patient stated that he has been drinking every day and he came in for detox patient was shaking and nausea and vomiting and stated that he goes into bad withdrawal  Patient treated with Valium and Ativan thiamine magnesium alcohol level is 206 patient 7 PM CIWA is equal to 10

## 2023-12-12 NOTE — H&P ADULT - ASSESSMENT
alcohol detox:  -states drinks 2-3 beers a day with last drink 5 AM  -Denies any prior hx of seizure/DT's  -ativan per [facility] protocol  -folic acid and thiamine  -librium 50 mg tid   -IV hydration  -seizure precautions    Full code  DVT ppx: AAT  GI ppx: Diet    anticipated LOS 1 day  alcohol detox:  -states drinks 2-3 beers a day with last drink 5 AM  -Denies any prior hx of seizure/DT's  -ativan per [facility] protocol  -folic acid and thiamine  -librium 50 mg tid   -IV hydration  -seizure precautions    Full code  DVT ppx: AAT  GI ppx: Diet    anticipated LOS 1 day

## 2023-12-12 NOTE — H&P ADULT - HISTORY OF PRESENT ILLNESS
54 y/o male with PMH of etoh abuse who presented to ED requesting detox. pt states that he is been admitted 5 times (twice at [facility] and 3 times at [facility]) in last year due to etoh related admission. denies any [rior hx of DT's or withdrawal seizure. admits of drinking 2-3 beers a day with last drink 5 AM today. in ED pt recieved 5 mg PO diazepam and 2 mg PO ativan despite that pt remains agitated hence admission was requested  52 y/o male with PMH of etoh abuse who presented to ED requesting detox. pt states that he is been admitted 5 times (twice at [facility] and 3 times at [facility]) in last year due to etoh related admission. denies any [rior hx of DT's or withdrawal seizure. admits of drinking 2-3 beers a day with last drink 5 AM today. in ED pt recieved 5 mg PO diazepam and 2 mg PO ativan despite that pt remains agitated hence admission was requested

## 2023-12-12 NOTE — H&P ADULT - NSHPPHYSICALEXAM_GEN_ALL_CORE
T(C): 36.8 (12-12-23 @ 16:51), Max: 36.8 (12-12-23 @ 16:51)  HR: 91 (12-12-23 @ 17:37) (91 - 126)  BP: 140/80 (12-12-23 @ 17:37) (122/81 - 156/74)  RR: 15 (12-12-23 @ 17:37) (15 - 16)  SpO2: 96% (12-12-23 @ 17:37) (96% - 98%)  Wt(kg): --Vital Signs Last 24 Hrs  T(C): 36.8 (12 Dec 2023 16:51), Max: 36.8 (12 Dec 2023 16:51)  T(F): 98.3 (12 Dec 2023 16:51), Max: 98.3 (12 Dec 2023 16:51)  HR: 91 (12 Dec 2023 17:37) (91 - 126)  BP: 140/80 (12 Dec 2023 17:37) (122/81 - 156/74)  BP(mean): --  RR: 15 (12 Dec 2023 17:37) (15 - 16)  SpO2: 96% (12 Dec 2023 17:37) (96% - 98%)    Parameters below as of 12 Dec 2023 17:37  Patient On (Oxygen Delivery Method): room air        PHYSICAL EXAM:  GENERAL: NAD  HENT:  Atraumatic, Normocephalic; No tonsillar erythema, exudates, or enlargement; Moist mucous membranes;   EYES: EOMI, PERRLA, conjunctiva and sclera clear, no lid-lag  NECK: Supple, No JVD, Normal thyroid  NERVOUS SYSTEM:  CN II - XII intact; Sensation intact; Motor Strength 5/5 B/L upper and lower extremities  CHEST/LUNG: Clear to percussion bilaterally; No rales, rhonchi, wheezing, or rubs; normal respiratory effort, no intercostal retractions; No pitting edema  HEART: Regular rate and rhythm; No murmurs, rubs, or gallops  ABDOMEN: Soft, Nontender, Nondistended; Bowel sounds present; No HSM  1. head and neck 2. spine, ribs, and pelvis 3. right upper extremity 4. left upper extremity 5. right lower extremity 6. left lower extremity   ROM (pain, crepitation or contracture)  SKIN: No rashes or lesions; normal texture and temperature  PSYCH: Anxious,  Alert & Oriented x 3

## 2023-12-12 NOTE — ED PROVIDER NOTE - OBJECTIVE STATEMENT
53-year-old male history of alcohol abuse patient stated that he has been drinking every day and he came in for detox patient was shaking and nausea and vomiting and stated that he goes into bad withdrawal

## 2023-12-13 VITALS
TEMPERATURE: 99 F | HEART RATE: 126 BPM | DIASTOLIC BLOOD PRESSURE: 90 MMHG | SYSTOLIC BLOOD PRESSURE: 129 MMHG | RESPIRATION RATE: 16 BRPM | OXYGEN SATURATION: 99 %

## 2023-12-13 DIAGNOSIS — D69.6 THROMBOCYTOPENIA, UNSPECIFIED: ICD-10-CM

## 2023-12-13 DIAGNOSIS — D53.9 NUTRITIONAL ANEMIA, UNSPECIFIED: ICD-10-CM

## 2023-12-13 DIAGNOSIS — F10.930 ALCOHOL USE, UNSPECIFIED WITH WITHDRAWAL, UNCOMPLICATED: ICD-10-CM

## 2023-12-13 DIAGNOSIS — Z29.9 ENCOUNTER FOR PROPHYLACTIC MEASURES, UNSPECIFIED: ICD-10-CM

## 2023-12-13 DIAGNOSIS — R74.01 ELEVATION OF LEVELS OF LIVER TRANSAMINASE LEVELS: ICD-10-CM

## 2023-12-13 LAB
ALBUMIN SERPL ELPH-MCNC: 3.6 G/DL — SIGNIFICANT CHANGE UP (ref 3.3–5)
ALBUMIN SERPL ELPH-MCNC: 3.6 G/DL — SIGNIFICANT CHANGE UP (ref 3.3–5)
ALP SERPL-CCNC: 55 U/L — SIGNIFICANT CHANGE UP (ref 40–120)
ALP SERPL-CCNC: 55 U/L — SIGNIFICANT CHANGE UP (ref 40–120)
ALT FLD-CCNC: 84 U/L — HIGH (ref 10–45)
ALT FLD-CCNC: 84 U/L — HIGH (ref 10–45)
ANION GAP SERPL CALC-SCNC: 6 MMOL/L — SIGNIFICANT CHANGE UP (ref 5–17)
ANION GAP SERPL CALC-SCNC: 6 MMOL/L — SIGNIFICANT CHANGE UP (ref 5–17)
APPEARANCE UR: CLEAR — SIGNIFICANT CHANGE UP
APPEARANCE UR: CLEAR — SIGNIFICANT CHANGE UP
AST SERPL-CCNC: 104 U/L — HIGH (ref 10–40)
AST SERPL-CCNC: 104 U/L — HIGH (ref 10–40)
BACTERIA # UR AUTO: NEGATIVE /HPF — SIGNIFICANT CHANGE UP
BACTERIA # UR AUTO: NEGATIVE /HPF — SIGNIFICANT CHANGE UP
BASOPHILS # BLD AUTO: 0.04 K/UL — SIGNIFICANT CHANGE UP (ref 0–0.2)
BASOPHILS # BLD AUTO: 0.04 K/UL — SIGNIFICANT CHANGE UP (ref 0–0.2)
BASOPHILS NFR BLD AUTO: 0.9 % — SIGNIFICANT CHANGE UP (ref 0–2)
BASOPHILS NFR BLD AUTO: 0.9 % — SIGNIFICANT CHANGE UP (ref 0–2)
BILIRUB DIRECT SERPL-MCNC: 0.2 MG/DL — SIGNIFICANT CHANGE UP (ref 0–0.3)
BILIRUB DIRECT SERPL-MCNC: 0.2 MG/DL — SIGNIFICANT CHANGE UP (ref 0–0.3)
BILIRUB INDIRECT FLD-MCNC: 0.8 MG/DL — SIGNIFICANT CHANGE UP (ref 0.2–1)
BILIRUB INDIRECT FLD-MCNC: 0.8 MG/DL — SIGNIFICANT CHANGE UP (ref 0.2–1)
BILIRUB SERPL-MCNC: 1 MG/DL — SIGNIFICANT CHANGE UP (ref 0.2–1.2)
BILIRUB SERPL-MCNC: 1 MG/DL — SIGNIFICANT CHANGE UP (ref 0.2–1.2)
BILIRUB UR-MCNC: NEGATIVE — SIGNIFICANT CHANGE UP
BILIRUB UR-MCNC: NEGATIVE — SIGNIFICANT CHANGE UP
BUN SERPL-MCNC: 9 MG/DL — SIGNIFICANT CHANGE UP (ref 7–23)
BUN SERPL-MCNC: 9 MG/DL — SIGNIFICANT CHANGE UP (ref 7–23)
CALCIUM SERPL-MCNC: 8.5 MG/DL — SIGNIFICANT CHANGE UP (ref 8.4–10.5)
CALCIUM SERPL-MCNC: 8.5 MG/DL — SIGNIFICANT CHANGE UP (ref 8.4–10.5)
CHLORIDE SERPL-SCNC: 101 MMOL/L — SIGNIFICANT CHANGE UP (ref 96–108)
CHLORIDE SERPL-SCNC: 101 MMOL/L — SIGNIFICANT CHANGE UP (ref 96–108)
CO2 SERPL-SCNC: 30 MMOL/L — SIGNIFICANT CHANGE UP (ref 22–31)
CO2 SERPL-SCNC: 30 MMOL/L — SIGNIFICANT CHANGE UP (ref 22–31)
COLOR SPEC: SIGNIFICANT CHANGE UP
COLOR SPEC: SIGNIFICANT CHANGE UP
COMMENT - URINE: SIGNIFICANT CHANGE UP
COMMENT - URINE: SIGNIFICANT CHANGE UP
CREAT SERPL-MCNC: 0.81 MG/DL — SIGNIFICANT CHANGE UP (ref 0.5–1.3)
CREAT SERPL-MCNC: 0.81 MG/DL — SIGNIFICANT CHANGE UP (ref 0.5–1.3)
DIFF PNL FLD: NEGATIVE — SIGNIFICANT CHANGE UP
DIFF PNL FLD: NEGATIVE — SIGNIFICANT CHANGE UP
EGFR: 106 ML/MIN/1.73M2 — SIGNIFICANT CHANGE UP
EGFR: 106 ML/MIN/1.73M2 — SIGNIFICANT CHANGE UP
EOSINOPHIL # BLD AUTO: 0.24 K/UL — SIGNIFICANT CHANGE UP (ref 0–0.5)
EOSINOPHIL # BLD AUTO: 0.24 K/UL — SIGNIFICANT CHANGE UP (ref 0–0.5)
EOSINOPHIL NFR BLD AUTO: 5.6 % — SIGNIFICANT CHANGE UP (ref 0–6)
EOSINOPHIL NFR BLD AUTO: 5.6 % — SIGNIFICANT CHANGE UP (ref 0–6)
EPI CELLS # UR: 1 — SIGNIFICANT CHANGE UP
EPI CELLS # UR: 1 — SIGNIFICANT CHANGE UP
GLUCOSE SERPL-MCNC: 110 MG/DL — HIGH (ref 70–99)
GLUCOSE SERPL-MCNC: 110 MG/DL — HIGH (ref 70–99)
GLUCOSE UR QL: NEGATIVE MG/DL — SIGNIFICANT CHANGE UP
GLUCOSE UR QL: NEGATIVE MG/DL — SIGNIFICANT CHANGE UP
HCT VFR BLD CALC: 36.9 % — LOW (ref 39–50)
HCT VFR BLD CALC: 36.9 % — LOW (ref 39–50)
HGB BLD-MCNC: 12.7 G/DL — LOW (ref 13–17)
HGB BLD-MCNC: 12.7 G/DL — LOW (ref 13–17)
IMM GRANULOCYTES NFR BLD AUTO: 0.2 % — SIGNIFICANT CHANGE UP (ref 0–0.9)
IMM GRANULOCYTES NFR BLD AUTO: 0.2 % — SIGNIFICANT CHANGE UP (ref 0–0.9)
KETONES UR-MCNC: ABNORMAL MG/DL
KETONES UR-MCNC: ABNORMAL MG/DL
LEUKOCYTE ESTERASE UR-ACNC: ABNORMAL
LEUKOCYTE ESTERASE UR-ACNC: ABNORMAL
LYMPHOCYTES # BLD AUTO: 1.4 K/UL — SIGNIFICANT CHANGE UP (ref 1–3.3)
LYMPHOCYTES # BLD AUTO: 1.4 K/UL — SIGNIFICANT CHANGE UP (ref 1–3.3)
LYMPHOCYTES # BLD AUTO: 32.9 % — SIGNIFICANT CHANGE UP (ref 13–44)
LYMPHOCYTES # BLD AUTO: 32.9 % — SIGNIFICANT CHANGE UP (ref 13–44)
MAGNESIUM SERPL-MCNC: 1.6 MG/DL — SIGNIFICANT CHANGE UP (ref 1.6–2.6)
MAGNESIUM SERPL-MCNC: 1.6 MG/DL — SIGNIFICANT CHANGE UP (ref 1.6–2.6)
MCHC RBC-ENTMCNC: 34.4 GM/DL — SIGNIFICANT CHANGE UP (ref 32–36)
MCHC RBC-ENTMCNC: 34.4 GM/DL — SIGNIFICANT CHANGE UP (ref 32–36)
MCHC RBC-ENTMCNC: 34.8 PG — HIGH (ref 27–34)
MCHC RBC-ENTMCNC: 34.8 PG — HIGH (ref 27–34)
MCV RBC AUTO: 101.1 FL — HIGH (ref 80–100)
MCV RBC AUTO: 101.1 FL — HIGH (ref 80–100)
MONOCYTES # BLD AUTO: 0.43 K/UL — SIGNIFICANT CHANGE UP (ref 0–0.9)
MONOCYTES # BLD AUTO: 0.43 K/UL — SIGNIFICANT CHANGE UP (ref 0–0.9)
MONOCYTES NFR BLD AUTO: 10.1 % — SIGNIFICANT CHANGE UP (ref 2–14)
MONOCYTES NFR BLD AUTO: 10.1 % — SIGNIFICANT CHANGE UP (ref 2–14)
NEUTROPHILS # BLD AUTO: 2.13 K/UL — SIGNIFICANT CHANGE UP (ref 1.8–7.4)
NEUTROPHILS # BLD AUTO: 2.13 K/UL — SIGNIFICANT CHANGE UP (ref 1.8–7.4)
NEUTROPHILS NFR BLD AUTO: 50.3 % — SIGNIFICANT CHANGE UP (ref 43–77)
NEUTROPHILS NFR BLD AUTO: 50.3 % — SIGNIFICANT CHANGE UP (ref 43–77)
NITRITE UR-MCNC: NEGATIVE — SIGNIFICANT CHANGE UP
NITRITE UR-MCNC: NEGATIVE — SIGNIFICANT CHANGE UP
NRBC # BLD: 0 /100 WBCS — SIGNIFICANT CHANGE UP (ref 0–0)
NRBC # BLD: 0 /100 WBCS — SIGNIFICANT CHANGE UP (ref 0–0)
PH UR: 8.5 (ref 5–8)
PH UR: 8.5 (ref 5–8)
PHOSPHATE SERPL-MCNC: 2.9 MG/DL — SIGNIFICANT CHANGE UP (ref 2.5–4.5)
PHOSPHATE SERPL-MCNC: 2.9 MG/DL — SIGNIFICANT CHANGE UP (ref 2.5–4.5)
PLATELET # BLD AUTO: 92 K/UL — LOW (ref 150–400)
PLATELET # BLD AUTO: 92 K/UL — LOW (ref 150–400)
POTASSIUM SERPL-MCNC: 3.4 MMOL/L — LOW (ref 3.5–5.3)
POTASSIUM SERPL-MCNC: 3.4 MMOL/L — LOW (ref 3.5–5.3)
POTASSIUM SERPL-SCNC: 3.4 MMOL/L — LOW (ref 3.5–5.3)
POTASSIUM SERPL-SCNC: 3.4 MMOL/L — LOW (ref 3.5–5.3)
PROT SERPL-MCNC: 6.7 G/DL — SIGNIFICANT CHANGE UP (ref 6–8.3)
PROT SERPL-MCNC: 6.7 G/DL — SIGNIFICANT CHANGE UP (ref 6–8.3)
PROT UR-MCNC: SIGNIFICANT CHANGE UP MG/DL
PROT UR-MCNC: SIGNIFICANT CHANGE UP MG/DL
RBC # BLD: 3.65 M/UL — LOW (ref 4.2–5.8)
RBC # BLD: 3.65 M/UL — LOW (ref 4.2–5.8)
RBC # FLD: 12.4 % — SIGNIFICANT CHANGE UP (ref 10.3–14.5)
RBC # FLD: 12.4 % — SIGNIFICANT CHANGE UP (ref 10.3–14.5)
RBC CASTS # UR COMP ASSIST: 0 /HPF — SIGNIFICANT CHANGE UP (ref 0–4)
RBC CASTS # UR COMP ASSIST: 0 /HPF — SIGNIFICANT CHANGE UP (ref 0–4)
SODIUM SERPL-SCNC: 137 MMOL/L — SIGNIFICANT CHANGE UP (ref 135–145)
SODIUM SERPL-SCNC: 137 MMOL/L — SIGNIFICANT CHANGE UP (ref 135–145)
SP GR SPEC: 1.02 — SIGNIFICANT CHANGE UP (ref 1–1.03)
SP GR SPEC: 1.02 — SIGNIFICANT CHANGE UP (ref 1–1.03)
UROBILINOGEN FLD QL: 1 MG/DL — SIGNIFICANT CHANGE UP (ref 0.2–1)
UROBILINOGEN FLD QL: 1 MG/DL — SIGNIFICANT CHANGE UP (ref 0.2–1)
WBC # BLD: 4.25 K/UL — SIGNIFICANT CHANGE UP (ref 3.8–10.5)
WBC # BLD: 4.25 K/UL — SIGNIFICANT CHANGE UP (ref 3.8–10.5)
WBC # FLD AUTO: 4.25 K/UL — SIGNIFICANT CHANGE UP (ref 3.8–10.5)
WBC # FLD AUTO: 4.25 K/UL — SIGNIFICANT CHANGE UP (ref 3.8–10.5)
WBC UR QL: 1 /HPF — SIGNIFICANT CHANGE UP (ref 0–5)
WBC UR QL: 1 /HPF — SIGNIFICANT CHANGE UP (ref 0–5)

## 2023-12-13 PROCEDURE — 99233 SBSQ HOSP IP/OBS HIGH 50: CPT

## 2023-12-13 RX ORDER — INFLUENZA VIRUS VACCINE 15; 15; 15; 15 UG/.5ML; UG/.5ML; UG/.5ML; UG/.5ML
0.5 SUSPENSION INTRAMUSCULAR ONCE
Refills: 0 | Status: DISCONTINUED | OUTPATIENT
Start: 2023-12-13 | End: 2023-12-14

## 2023-12-13 RX ORDER — SODIUM CHLORIDE 9 MG/ML
1000 INJECTION, SOLUTION INTRAVENOUS
Refills: 0 | Status: DISCONTINUED | OUTPATIENT
Start: 2023-12-13 | End: 2023-12-14

## 2023-12-13 RX ADMIN — Medication 50 MILLIGRAM(S): at 05:05

## 2023-12-13 RX ADMIN — Medication 1.5 MILLIGRAM(S): at 17:38

## 2023-12-13 RX ADMIN — Medication 2 MILLIGRAM(S): at 01:59

## 2023-12-13 RX ADMIN — Medication 2 MILLIGRAM(S): at 07:53

## 2023-12-13 RX ADMIN — Medication 50 MILLIGRAM(S): at 13:14

## 2023-12-13 RX ADMIN — Medication 2 MILLIGRAM(S): at 00:47

## 2023-12-13 RX ADMIN — Medication 1.5 MILLIGRAM(S): at 21:14

## 2023-12-13 RX ADMIN — Medication 2 MILLIGRAM(S): at 11:31

## 2023-12-13 RX ADMIN — Medication 1 MILLIGRAM(S): at 11:37

## 2023-12-13 RX ADMIN — SODIUM CHLORIDE 100 MILLILITER(S): 9 INJECTION, SOLUTION INTRAVENOUS at 17:40

## 2023-12-13 RX ADMIN — SODIUM CHLORIDE 100 MILLILITER(S): 9 INJECTION, SOLUTION INTRAVENOUS at 11:32

## 2023-12-13 RX ADMIN — Medication 100 MILLIGRAM(S): at 11:37

## 2023-12-13 RX ADMIN — DEXTROSE MONOHYDRATE, SODIUM CHLORIDE, AND POTASSIUM CHLORIDE 125 MILLILITER(S): 50; .745; 4.5 INJECTION, SOLUTION INTRAVENOUS at 07:53

## 2023-12-13 RX ADMIN — Medication 2 MILLIGRAM(S): at 05:05

## 2023-12-13 NOTE — PROGRESS NOTE ADULT - SUBJECTIVE AND OBJECTIVE BOX
[facility] Medicine  Dr. Nicole Roche  Contact: Available on teams     PROGRESS NOTE:     Patient is a 53y old  Male who presents with a chief complaint of detox (12 Dec 2023 20:00)    SUBJECTIVE / OVERNIGHT EVENTS: Patient seen and examined. Reports last drink was yesterday morning. Now with moderate tremors. Denies any hallucinations or tactile disturbances. Reports multiple prior admissions for detox dating back many years. Denies h/o seizures or ICU admission for DTs. Not forthcoming about his stressors and reason for alcohol abuse. Denies any other concerns at this time. CIWA 2     ADDITIONAL REVIEW OF SYSTEMS:    MEDICATIONS  (STANDING):  chlordiazePOXIDE 50 milliGRAM(s) Oral <User Schedule>  folic acid 1 milliGRAM(s) Oral daily  influenza   Vaccine 0.5 milliLiter(s) IntraMuscular once  lactated ringers. 1000 milliLiter(s) (100 mL/Hr) IV Continuous <Continuous>  thiamine 100 milliGRAM(s) Oral daily    MEDICATIONS  (PRN):  acetaminophen     Tablet .. 650 milliGRAM(s) Oral every 6 hours PRN Temp greater or equal to 38C (100.4F), Mild Pain (1 - 3)  aluminum hydroxide/magnesium hydroxide/simethicone Suspension 30 milliLiter(s) Oral every 4 hours PRN Dyspepsia  LORazepam   Injectable 2 milliGRAM(s) IV Push every 1 hour PRN CIWA-Ar score 8 or greater  melatonin 3 milliGRAM(s) Oral at bedtime PRN Insomnia  ondansetron Injectable 4 milliGRAM(s) IV Push every 8 hours PRN Nausea and/or Vomiting      PHYSICAL EXAM:  Vital Signs Last 24 Hrs  T(C): 36.8 (12 Dec 2023 16:51), Max: 36.8 (12 Dec 2023 16:51)  T(F): 98.3 (12 Dec 2023 16:51), Max: 98.3 (12 Dec 2023 16:51)  HR: 93 (12 Dec 2023 21:22) (91 - 126)  BP: 119/73 (12 Dec 2023 21:22) (119/73 - 156/74)  BP(mean): --  RR: 16 (12 Dec 2023 21:22) (15 - 16)  SpO2: 98% (12 Dec 2023 21:22) (96% - 98%)    Parameters below as of 12 Dec 2023 21:22  Patient On (Oxygen Delivery Method): room air      CONSTITUTIONAL: NAD, sleeping and easily arousable   RESPIRATORY: Normal respiratory effort; lungs are clear to auscultation bilaterally  CARDIOVASCULAR: Regular rate and rhythm, normal S1 and S2, no murmur/rub/gallop; No lower extremity edema; Peripheral pulses are 2+ bilaterally  ABDOMEN: Nontender to palpation, normoactive bowel sounds, no rebound/guarding; No hepatosplenomegaly  MUSCLOSKELETAL: no clubbing or cyanosis of digits; no joint swelling or tenderness to palpation  Neuro - tremors+, mild tongue fasciculations   PSYCH: A+O to person, place, and time; affect appropriate    LABS:                        12.7   4.25  )-----------( 92       ( 13 Dec 2023 06:31 )             36.9         137  |  101  |  9   ----------------------------<  110<H>  3.4<L>   |  30  |  0.81    Ca    8.5      13 Dec 2023 06:31  Phos  2.9       Mg     1.6         TPro  6.7  /  Alb  3.6  /  TBili  1.0  /  DBili  0.2  /  AST  104<H>  /  ALT  84<H>  /  AlkPhos  55        Urinalysis Basic - ( 13 Dec 2023 12:30 )    Color: Dark Yellow / Appearance: Clear / S.019 / pH: x  Gluc: x / Ketone: Trace mg/dL  / Bili: Negative / Urobili: 1.0 mg/dL   Blood: x / Protein: Trace mg/dL / Nitrite: Negative   Leuk Esterase: Trace / RBC: 0 /HPF / WBC 1 /HPF   Sq Epi: x / Non Sq Epi: x / Bacteria: Negative /HPF          RADIOLOGY & ADDITIONAL TESTS:  Results Reviewed:   Imaging Personally Reviewed:  Electrocardiogram Personally Reviewed:    COORDINATION OF CARE:  Care Discussed with Consultants/Other Providers [Y/N]:  Prior or Outpatient Records Reviewed [Y/N]:   [facility] Medicine  Dr. Nicole Roche  Contact: Available on teams     PROGRESS NOTE:     Patient is a 53y old  Male who presents with a chief complaint of detox (12 Dec 2023 20:00)    SUBJECTIVE / OVERNIGHT EVENTS: Patient seen and examined. Reports last drink was yesterday morning. Now with moderate tremors. Denies any hallucinations or tactile disturbances. Reports multiple prior admissions for detox dating back many years. Denies h/o seizures or ICU admission for DTs. Not forthcoming about his stressors and reason for alcohol abuse. Denies any other concerns at this time. CIWA 2     ADDITIONAL REVIEW OF SYSTEMS:    MEDICATIONS  (STANDING):  chlordiazePOXIDE 50 milliGRAM(s) Oral <User Schedule>  folic acid 1 milliGRAM(s) Oral daily  influenza   Vaccine 0.5 milliLiter(s) IntraMuscular once  lactated ringers. 1000 milliLiter(s) (100 mL/Hr) IV Continuous <Continuous>  thiamine 100 milliGRAM(s) Oral daily    MEDICATIONS  (PRN):  acetaminophen     Tablet .. 650 milliGRAM(s) Oral every 6 hours PRN Temp greater or equal to 38C (100.4F), Mild Pain (1 - 3)  aluminum hydroxide/magnesium hydroxide/simethicone Suspension 30 milliLiter(s) Oral every 4 hours PRN Dyspepsia  LORazepam   Injectable 2 milliGRAM(s) IV Push every 1 hour PRN CIWA-Ar score 8 or greater  melatonin 3 milliGRAM(s) Oral at bedtime PRN Insomnia  ondansetron Injectable 4 milliGRAM(s) IV Push every 8 hours PRN Nausea and/or Vomiting      PHYSICAL EXAM:  Vital Signs Last 24 Hrs  T(C): 36.8 (12 Dec 2023 16:51), Max: 36.8 (12 Dec 2023 16:51)  T(F): 98.3 (12 Dec 2023 16:51), Max: 98.3 (12 Dec 2023 16:51)  HR: 93 (12 Dec 2023 21:22) (91 - 126)  BP: 119/73 (12 Dec 2023 21:22) (119/73 - 156/74)  BP(mean): --  RR: 16 (12 Dec 2023 21:22) (15 - 16)  SpO2: 98% (12 Dec 2023 21:22) (96% - 98%)    Parameters below as of 12 Dec 2023 21:22  Patient On (Oxygen Delivery Method): room air      CONSTITUTIONAL: NAD, sleeping and easily arousable   RESPIRATORY: Normal respiratory effort; lungs are clear to auscultation bilaterally  CARDIOVASCULAR: Regular rate and rhythm, normal S1 and S2, no murmur/rub/gallop; No lower extremity edema; Peripheral pulses are 2+ bilaterally  ABDOMEN: Nontender to palpation, normoactive bowel sounds, no rebound/guarding; No hepatosplenomegaly  MUSCLOSKELETAL: no clubbing or cyanosis of digits; no joint swelling or tenderness to palpation  Neuro - tremors+, mild tongue fasciculations   PSYCH: A+O to person, place, and time; affect appropriate    LABS:                        12.7   4.25  )-----------( 92       ( 13 Dec 2023 06:31 )             36.9         137  |  101  |  9   ----------------------------<  110<H>  3.4<L>   |  30  |  0.81    Ca    8.5      13 Dec 2023 06:31  Phos  2.9       Mg     1.6         TPro  6.7  /  Alb  3.6  /  TBili  1.0  /  DBili  0.2  /  AST  104<H>  /  ALT  84<H>  /  AlkPhos  55        Urinalysis Basic - ( 13 Dec 2023 12:30 )    Color: Dark Yellow / Appearance: Clear / S.019 / pH: x  Gluc: x / Ketone: Trace mg/dL  / Bili: Negative / Urobili: 1.0 mg/dL   Blood: x / Protein: Trace mg/dL / Nitrite: Negative   Leuk Esterase: Trace / RBC: 0 /HPF / WBC 1 /HPF   Sq Epi: x / Non Sq Epi: x / Bacteria: Negative /HPF          RADIOLOGY & ADDITIONAL TESTS:  Results Reviewed:   Imaging Personally Reviewed:  Electrocardiogram Personally Reviewed:    COORDINATION OF CARE:  Care Discussed with Consultants/Other Providers [Y/N]:  Prior or Outpatient Records Reviewed [Y/N]:   [facility] Medicine  Dr. Nicole Roche  Contact: Available on teams     PROGRESS NOTE:     Patient is a 53y old  Male who presents with a chief complaint of detox (12 Dec 2023 20:00)    SUBJECTIVE / OVERNIGHT EVENTS: Patient seen and examined. Reports last drink was yesterday morning. Now with moderate tremors and anxiety. Denies any hallucinations or tactile disturbances. Reports multiple prior admissions for detox dating back many years. Denies h/o seizures or ICU admission for DTs. Not forthcoming about his stressors and reason for alcohol abuse. Denies any other concerns at this time.    ADDITIONAL REVIEW OF SYSTEMS:    MEDICATIONS  (STANDING):  chlordiazePOXIDE 50 milliGRAM(s) Oral <User Schedule>  folic acid 1 milliGRAM(s) Oral daily  influenza   Vaccine 0.5 milliLiter(s) IntraMuscular once  lactated ringers. 1000 milliLiter(s) (100 mL/Hr) IV Continuous <Continuous>  thiamine 100 milliGRAM(s) Oral daily    MEDICATIONS  (PRN):  acetaminophen     Tablet .. 650 milliGRAM(s) Oral every 6 hours PRN Temp greater or equal to 38C (100.4F), Mild Pain (1 - 3)  aluminum hydroxide/magnesium hydroxide/simethicone Suspension 30 milliLiter(s) Oral every 4 hours PRN Dyspepsia  LORazepam   Injectable 2 milliGRAM(s) IV Push every 1 hour PRN CIWA-Ar score 8 or greater  melatonin 3 milliGRAM(s) Oral at bedtime PRN Insomnia  ondansetron Injectable 4 milliGRAM(s) IV Push every 8 hours PRN Nausea and/or Vomiting      PHYSICAL EXAM:  Vital Signs Last 24 Hrs  T(C): 36.8 (12 Dec 2023 16:51), Max: 36.8 (12 Dec 2023 16:51)  T(F): 98.3 (12 Dec 2023 16:51), Max: 98.3 (12 Dec 2023 16:51)  HR: 93 (12 Dec 2023 21:22) (91 - 126)  BP: 119/73 (12 Dec 2023 21:22) (119/73 - 156/74)  BP(mean): --  RR: 16 (12 Dec 2023 21:22) (15 - 16)  SpO2: 98% (12 Dec 2023 21:22) (96% - 98%)    Parameters below as of 12 Dec 2023 21:22  Patient On (Oxygen Delivery Method): room air      CONSTITUTIONAL: NAD, sleeping and easily arousable   RESPIRATORY: Normal respiratory effort; lungs are clear to auscultation bilaterally  CARDIOVASCULAR: Regular rate and rhythm, normal S1 and S2, no murmur/rub/gallop; No lower extremity edema; Peripheral pulses are 2+ bilaterally  ABDOMEN: Nontender to palpation, normoactive bowel sounds, no rebound/guarding; No hepatosplenomegaly  MUSCLOSKELETAL: no clubbing or cyanosis of digits; no joint swelling or tenderness to palpation  Neuro - tremors+, mild tongue fasciculations   PSYCH: A+O to person, place, and time; affect appropriate    LABS:                        12.7   4.25  )-----------( 92       ( 13 Dec 2023 06:31 )             36.9         137  |  101  |  9   ----------------------------<  110<H>  3.4<L>   |  30  |  0.81    Ca    8.5      13 Dec 2023 06:31  Phos  2.9       Mg     1.6         TPro  6.7  /  Alb  3.6  /  TBili  1.0  /  DBili  0.2  /  AST  104<H>  /  ALT  84<H>  /  AlkPhos  55        Urinalysis Basic - ( 13 Dec 2023 12:30 )    Color: Dark Yellow / Appearance: Clear / S.019 / pH: x  Gluc: x / Ketone: Trace mg/dL  / Bili: Negative / Urobili: 1.0 mg/dL   Blood: x / Protein: Trace mg/dL / Nitrite: Negative   Leuk Esterase: Trace / RBC: 0 /HPF / WBC 1 /HPF   Sq Epi: x / Non Sq Epi: x / Bacteria: Negative /HPF          RADIOLOGY & ADDITIONAL TESTS:  Results Reviewed:   Imaging Personally Reviewed:  Electrocardiogram Personally Reviewed:    COORDINATION OF CARE:  Care Discussed with Consultants/Other Providers [Y/N]:  Prior or Outpatient Records Reviewed [Y/N]:   [facility] Medicine  Dr. Nicole Roche  Contact: Available on teams     PROGRESS NOTE:     Patient is a 53y old  Male who presents with a chief complaint of detox (12 Dec 2023 20:00)    SUBJECTIVE / OVERNIGHT EVENTS: Patient seen and examined. Reports last drink was yesterday morning. Now with moderate tremors and anxiety. Denies any hallucinations or tactile disturbances. Reports multiple prior admissions for detox dating back many years. Denies h/o seizures or ICU admission for DTs. Not forthcoming about his stressors and reason for alcohol abuse. Denies any other concerns at this time.    ADDITIONAL REVIEW OF SYSTEMS:    MEDICATIONS  (STANDING):  chlordiazePOXIDE 50 milliGRAM(s) Oral <User Schedule>  folic acid 1 milliGRAM(s) Oral daily  influenza   Vaccine 0.5 milliLiter(s) IntraMuscular once  lactated ringers. 1000 milliLiter(s) (100 mL/Hr) IV Continuous <Continuous>  thiamine 100 milliGRAM(s) Oral daily    MEDICATIONS  (PRN):  acetaminophen     Tablet .. 650 milliGRAM(s) Oral every 6 hours PRN Temp greater or equal to 38C (100.4F), Mild Pain (1 - 3)  aluminum hydroxide/magnesium hydroxide/simethicone Suspension 30 milliLiter(s) Oral every 4 hours PRN Dyspepsia  LORazepam   Injectable 2 milliGRAM(s) IV Push every 1 hour PRN CIWA-Ar score 8 or greater  melatonin 3 milliGRAM(s) Oral at bedtime PRN Insomnia  ondansetron Injectable 4 milliGRAM(s) IV Push every 8 hours PRN Nausea and/or Vomiting      PHYSICAL EXAM:  Vital Signs Last 24 Hrs  T(C): 36.8 (12 Dec 2023 16:51), Max: 36.8 (12 Dec 2023 16:51)  T(F): 98.3 (12 Dec 2023 16:51), Max: 98.3 (12 Dec 2023 16:51)  HR: 93 (12 Dec 2023 21:22) (91 - 126)  BP: 119/73 (12 Dec 2023 21:22) (119/73 - 156/74)  BP(mean): --  RR: 16 (12 Dec 2023 21:22) (15 - 16)  SpO2: 98% (12 Dec 2023 21:22) (96% - 98%)    Parameters below as of 12 Dec 2023 21:22  Patient On (Oxygen Delivery Method): room air      CONSTITUTIONAL: NAD, sleeping and easily arousable   RESPIRATORY: Normal respiratory effort; lungs are clear to auscultation bilaterally  CARDIOVASCULAR: Regular rate and rhythm, normal S1 and S2, no murmur/rub/gallop; No lower extremity edema; Peripheral pulses are 2+ bilaterally  ABDOMEN: Nontender to palpation, normoactive bowel sounds, no rebound/guarding; No hepatosplenomegaly  MUSCLOSKELETAL: no clubbing or cyanosis of digits; no joint swelling or tenderness to palpation  Neuro - tremors+, mild tongue fasciculations   PSYCH: A+O to person, place, and time; affect appropriate    LABS:                        12.7   4.25  )-----------( 92       ( 13 Dec 2023 06:31 )             36.9         137  |  101  |  9   ----------------------------<  110<H>  3.4<L>   |  30  |  0.81    Ca    8.5      13 Dec 2023 06:31  Phos  2.9       Mg     1.6         TPro  6.7  /  Alb  3.6  /  TBili  1.0  /  DBili  0.2  /  AST  104<H>  /  ALT  84<H>  /  AlkPhos  55        Urinalysis Basic - ( 13 Dec 2023 12:30 )    Color: Dark Yellow / Appearance: Clear / S.019 / pH: x  Gluc: x / Ketone: Trace mg/dL  / Bili: Negative / Urobili: 1.0 mg/dL   Blood: x / Protein: Trace mg/dL / Nitrite: Negative   Leuk Esterase: Trace / RBC: 0 /HPF / WBC 1 /HPF   Sq Epi: x / Non Sq Epi: x / Bacteria: Negative /HPF          RADIOLOGY & ADDITIONAL TESTS:  Results Reviewed:   Imaging Personally Reviewed:  Electrocardiogram Personally Reviewed:    COORDINATION OF CARE:  Care Discussed with Consultants/Other Providers [Y/N]:  Prior or Outpatient Records Reviewed [Y/N]:

## 2023-12-13 NOTE — PATIENT PROFILE ADULT - FALL HARM RISK - UNIVERSAL INTERVENTIONS
Bed in lowest position, wheels locked, appropriate side rails in place/Call bell, personal items and telephone in reach/Instruct patient to call for assistance before getting out of bed or chair/Non-slip footwear when patient is out of bed/[place] to call system/Physically safe environment - no spills, clutter or unnecessary equipment/Purposeful Proactive Rounding/Room/bathroom lighting operational, light cord in reach Bed in lowest position, wheels locked, appropriate side rails in place/Call bell, personal items and telephone in reach/Instruct patient to call for assistance before getting out of bed or chair/Non-slip footwear when patient is out of bed/[place] to call system/Physically safe environment - no spills, clutter or unnecessary equipment/Purposeful Proactive Rounding/Room/bathroom lighting operational, light cord in reach

## 2023-12-13 NOTE — PROGRESS NOTE ADULT - PROBLEM SELECTOR PLAN 1
-patient with h/o alcohol abuse and multiple prior hospitalization for alcohol withdrawal   -denies prior h/o DTs or seizures   -CIwa score reviewed, currently scoring 2  -on librium TID with ativan PRN for symptoms trigger  -c/w IVF, mvi, folic acid and thiamine   -SW evaluation -patient with h/o alcohol abuse and multiple prior hospitalization for alcohol withdrawal   -denies prior h/o DTs or seizures   -CIwa score reviewed, currently scoring 2 however with moderate tremors, pacing hallway and anxious appearing  -start ativan taper, can expedite tomorrow if clinically improving.  -c/w IVF, mvi, folic acid and thiamine   -SW evaluation

## 2023-12-13 NOTE — ED ADULT NURSE REASSESSMENT NOTE - NS ED NURSE REASSESS COMMENT FT1
Assisting primary LANETTE Kenny at this time. Patient states he would like to leave against medical advice and requesting IV to be removed. MD Deng called and states he will come to department to evaluate patient. LANETTE Kenny at bedside with patient and author of note during conversation. A&OX4, ambulatory with steady gait and demonstrates competence at the present. Assisting primary LANETTE Kenyn at this time. Patient states he would like to leave against medical advice and requesting IV to be removed. MD Deng called and states he will come to department to evaluate patient. LANETTE Kenny at bedside with patient and author of note during conversation. A&OX4, ambulatory with steady gait and demonstrates competency at the present. Assisting primary LANETTE Kenny at this time. Patient states he would like to leave against medical advice and requesting IV to be removed. MD Deng called and states he will come to department to evaluate patient. LANETTE Kenny at bedside with patient and author of note during conversation. A&OX4, ambulatory with steady gait and demonstrates competency at the present.

## 2023-12-13 NOTE — PROGRESS NOTE ADULT - PROBLEM SELECTOR PLAN 5
-low risk  -ambulate as tolerated     Offered to speak to family to provide updates however patient declined.

## 2023-12-14 ENCOUNTER — TRANSCRIPTION ENCOUNTER (OUTPATIENT)
Age: 53
End: 2023-12-14

## 2023-12-14 LAB
ALBUMIN SERPL ELPH-MCNC: 3.8 G/DL — SIGNIFICANT CHANGE UP (ref 3.3–5)
ALBUMIN SERPL ELPH-MCNC: 3.8 G/DL — SIGNIFICANT CHANGE UP (ref 3.3–5)
ALP SERPL-CCNC: 53 U/L — SIGNIFICANT CHANGE UP (ref 40–120)
ALP SERPL-CCNC: 53 U/L — SIGNIFICANT CHANGE UP (ref 40–120)
ALT FLD-CCNC: 71 U/L — HIGH (ref 10–45)
ALT FLD-CCNC: 71 U/L — HIGH (ref 10–45)
ANION GAP SERPL CALC-SCNC: 11 MMOL/L — SIGNIFICANT CHANGE UP (ref 5–17)
ANION GAP SERPL CALC-SCNC: 11 MMOL/L — SIGNIFICANT CHANGE UP (ref 5–17)
AST SERPL-CCNC: 68 U/L — HIGH (ref 10–40)
AST SERPL-CCNC: 68 U/L — HIGH (ref 10–40)
BILIRUB SERPL-MCNC: 0.8 MG/DL — SIGNIFICANT CHANGE UP (ref 0.2–1.2)
BILIRUB SERPL-MCNC: 0.8 MG/DL — SIGNIFICANT CHANGE UP (ref 0.2–1.2)
BUN SERPL-MCNC: 11 MG/DL — SIGNIFICANT CHANGE UP (ref 7–23)
BUN SERPL-MCNC: 11 MG/DL — SIGNIFICANT CHANGE UP (ref 7–23)
CALCIUM SERPL-MCNC: 9.1 MG/DL — SIGNIFICANT CHANGE UP (ref 8.4–10.5)
CALCIUM SERPL-MCNC: 9.1 MG/DL — SIGNIFICANT CHANGE UP (ref 8.4–10.5)
CHLORIDE SERPL-SCNC: 102 MMOL/L — SIGNIFICANT CHANGE UP (ref 96–108)
CHLORIDE SERPL-SCNC: 102 MMOL/L — SIGNIFICANT CHANGE UP (ref 96–108)
CO2 SERPL-SCNC: 23 MMOL/L — SIGNIFICANT CHANGE UP (ref 22–31)
CO2 SERPL-SCNC: 23 MMOL/L — SIGNIFICANT CHANGE UP (ref 22–31)
CREAT SERPL-MCNC: 0.75 MG/DL — SIGNIFICANT CHANGE UP (ref 0.5–1.3)
CREAT SERPL-MCNC: 0.75 MG/DL — SIGNIFICANT CHANGE UP (ref 0.5–1.3)
EGFR: 108 ML/MIN/1.73M2 — SIGNIFICANT CHANGE UP
EGFR: 108 ML/MIN/1.73M2 — SIGNIFICANT CHANGE UP
FOLATE SERPL-MCNC: >20 NG/ML — SIGNIFICANT CHANGE UP
FOLATE SERPL-MCNC: >20 NG/ML — SIGNIFICANT CHANGE UP
GLUCOSE SERPL-MCNC: 95 MG/DL — SIGNIFICANT CHANGE UP (ref 70–99)
GLUCOSE SERPL-MCNC: 95 MG/DL — SIGNIFICANT CHANGE UP (ref 70–99)
HCT VFR BLD CALC: 34.8 % — LOW (ref 39–50)
HCT VFR BLD CALC: 34.8 % — LOW (ref 39–50)
HGB BLD-MCNC: 11.8 G/DL — LOW (ref 13–17)
HGB BLD-MCNC: 11.8 G/DL — LOW (ref 13–17)
MAGNESIUM SERPL-MCNC: 1.7 MG/DL — SIGNIFICANT CHANGE UP (ref 1.6–2.6)
MAGNESIUM SERPL-MCNC: 1.7 MG/DL — SIGNIFICANT CHANGE UP (ref 1.6–2.6)
MCHC RBC-ENTMCNC: 33.9 GM/DL — SIGNIFICANT CHANGE UP (ref 32–36)
MCHC RBC-ENTMCNC: 33.9 GM/DL — SIGNIFICANT CHANGE UP (ref 32–36)
MCHC RBC-ENTMCNC: 34.9 PG — HIGH (ref 27–34)
MCHC RBC-ENTMCNC: 34.9 PG — HIGH (ref 27–34)
MCV RBC AUTO: 103 FL — HIGH (ref 80–100)
MCV RBC AUTO: 103 FL — HIGH (ref 80–100)
NRBC # BLD: 0 /100 WBCS — SIGNIFICANT CHANGE UP (ref 0–0)
NRBC # BLD: 0 /100 WBCS — SIGNIFICANT CHANGE UP (ref 0–0)
PHOSPHATE SERPL-MCNC: 3.5 MG/DL — SIGNIFICANT CHANGE UP (ref 2.5–4.5)
PHOSPHATE SERPL-MCNC: 3.5 MG/DL — SIGNIFICANT CHANGE UP (ref 2.5–4.5)
PLATELET # BLD AUTO: 70 K/UL — LOW (ref 150–400)
PLATELET # BLD AUTO: 70 K/UL — LOW (ref 150–400)
POTASSIUM SERPL-MCNC: 3.4 MMOL/L — LOW (ref 3.5–5.3)
POTASSIUM SERPL-MCNC: 3.4 MMOL/L — LOW (ref 3.5–5.3)
POTASSIUM SERPL-SCNC: 3.4 MMOL/L — LOW (ref 3.5–5.3)
POTASSIUM SERPL-SCNC: 3.4 MMOL/L — LOW (ref 3.5–5.3)
PROT SERPL-MCNC: 7 G/DL — SIGNIFICANT CHANGE UP (ref 6–8.3)
PROT SERPL-MCNC: 7 G/DL — SIGNIFICANT CHANGE UP (ref 6–8.3)
RBC # BLD: 3.38 M/UL — LOW (ref 4.2–5.8)
RBC # BLD: 3.38 M/UL — LOW (ref 4.2–5.8)
RBC # FLD: 12.3 % — SIGNIFICANT CHANGE UP (ref 10.3–14.5)
RBC # FLD: 12.3 % — SIGNIFICANT CHANGE UP (ref 10.3–14.5)
SODIUM SERPL-SCNC: 136 MMOL/L — SIGNIFICANT CHANGE UP (ref 135–145)
SODIUM SERPL-SCNC: 136 MMOL/L — SIGNIFICANT CHANGE UP (ref 135–145)
VIT B12 SERPL-MCNC: 689 PG/ML — SIGNIFICANT CHANGE UP (ref 232–1245)
VIT B12 SERPL-MCNC: 689 PG/ML — SIGNIFICANT CHANGE UP (ref 232–1245)
WBC # BLD: 5.21 K/UL — SIGNIFICANT CHANGE UP (ref 3.8–10.5)
WBC # BLD: 5.21 K/UL — SIGNIFICANT CHANGE UP (ref 3.8–10.5)
WBC # FLD AUTO: 5.21 K/UL — SIGNIFICANT CHANGE UP (ref 3.8–10.5)
WBC # FLD AUTO: 5.21 K/UL — SIGNIFICANT CHANGE UP (ref 3.8–10.5)

## 2023-12-14 PROCEDURE — 82607 VITAMIN B-12: CPT

## 2023-12-14 PROCEDURE — 81001 URINALYSIS AUTO W/SCOPE: CPT

## 2023-12-14 PROCEDURE — 80053 COMPREHEN METABOLIC PANEL: CPT

## 2023-12-14 PROCEDURE — 83735 ASSAY OF MAGNESIUM: CPT

## 2023-12-14 PROCEDURE — 82803 BLOOD GASES ANY COMBINATION: CPT

## 2023-12-14 PROCEDURE — 80076 HEPATIC FUNCTION PANEL: CPT

## 2023-12-14 PROCEDURE — 82746 ASSAY OF FOLIC ACID SERUM: CPT

## 2023-12-14 PROCEDURE — 99239 HOSP IP/OBS DSCHRG MGMT >30: CPT

## 2023-12-14 PROCEDURE — 36600 WITHDRAWAL OF ARTERIAL BLOOD: CPT

## 2023-12-14 PROCEDURE — 80307 DRUG TEST PRSMV CHEM ANLYZR: CPT

## 2023-12-14 PROCEDURE — 36415 COLL VENOUS BLD VENIPUNCTURE: CPT

## 2023-12-14 PROCEDURE — 85025 COMPLETE CBC W/AUTO DIFF WBC: CPT

## 2023-12-14 PROCEDURE — 99285 EMERGENCY DEPT VISIT HI MDM: CPT | Mod: 25

## 2023-12-14 PROCEDURE — 96375 TX/PRO/DX INJ NEW DRUG ADDON: CPT

## 2023-12-14 PROCEDURE — 80048 BASIC METABOLIC PNL TOTAL CA: CPT

## 2023-12-14 PROCEDURE — 85027 COMPLETE CBC AUTOMATED: CPT

## 2023-12-14 PROCEDURE — 96374 THER/PROPH/DIAG INJ IV PUSH: CPT

## 2023-12-14 PROCEDURE — 84100 ASSAY OF PHOSPHORUS: CPT

## 2023-12-14 RX ORDER — QUETIAPINE FUMARATE 200 MG/1
25 TABLET, FILM COATED ORAL ONCE
Refills: 0 | Status: COMPLETED | OUTPATIENT
Start: 2023-12-14 | End: 2023-12-14

## 2023-12-14 RX ORDER — QUETIAPINE FUMARATE 200 MG/1
25 TABLET, FILM COATED ORAL AT BEDTIME
Refills: 0 | Status: DISCONTINUED | OUTPATIENT
Start: 2023-12-14 | End: 2023-12-14

## 2023-12-14 RX ORDER — POTASSIUM CHLORIDE 20 MEQ
20 PACKET (EA) ORAL ONCE
Refills: 0 | Status: DISCONTINUED | OUTPATIENT
Start: 2023-12-14 | End: 2023-12-14

## 2023-12-14 RX ADMIN — Medication 2 MILLIGRAM(S): at 00:20

## 2023-12-14 RX ADMIN — Medication 1.5 MILLIGRAM(S): at 04:10

## 2023-12-14 RX ADMIN — QUETIAPINE FUMARATE 25 MILLIGRAM(S): 200 TABLET, FILM COATED ORAL at 01:05

## 2023-12-14 NOTE — PROGRESS NOTE ADULT - ASSESSMENT
52 y/o M hx EtOH abuse admitted for EtOH withdrawal.     Uncomplicated EtOH withdrawal  -CIWA 0 this am  -Continue CIWA protcol, IVF  -B12/thiamine/folic acid    Thrombocytopenia   -Likely due to above    Transaminitis  -Improving, monitor 
54 y/o M admitted for alcohol withdrawal

## 2023-12-14 NOTE — DISCHARGE NOTE PROVIDER - NSDCCPCAREPLAN_GEN_ALL_CORE_FT
DATE OF SERVICE:  11/02/2017    PREPROCEDURE DIAGNOSIS:  Retropharyngeal edema.    POST-PROCEDURE DIAGNOSIS:  Retropharyngeal edema.    PROCEDURE:  Flexible laryngoscopy.    SURGEON:  Robson Caba MD    ASSISTANT:  None.    ANESTHESIA:  None.    ESTIMATED BLOOD LOSS:  None.    SPECIMENS:  None.    INDICATIONS:  This is an 19-year-old who has infected left Node of Rouviere   and also has retropharyngeal edema. Verbal consent was obtained.    The flexible bronchoscope was introduced through the right nostril.  Septum is   in midline.  The airway has extensive large swelling of the adenoid.  It was   purulence in it as well.  I was unable to pass on the right hand side.  So, I   used the left hand side.  The base of tongue was symmetrically enlarged.  The   epiglottis and endolarynx appeared normal.  There is no laryngeal edema.    There is no obvious bulge in the posterior pharyngeal wall.  The tonsils were   enlarged.    Patient tolerated the procedure well with no EBL.       ____________________________________     MD ANA Ricketts / GAVIN    DD:  11/02/2017 14:47:04  DT:  11/02/2017 15:57:54    D#:  [id number]  Job#:  [id number]     PRINCIPAL DISCHARGE DIAGNOSIS  Diagnosis: Acute hyperactive alcohol withdrawal delirium  Assessment and Plan of Treatment: Follow up with your doctor within 1 week

## 2023-12-14 NOTE — DISCHARGE NOTE NURSING/CASE MANAGEMENT/SOCIAL WORK - NSDCPEFALRISK_GEN_ALL_CORE
For information on Fall & Injury Prevention, visit: [email or web address] OR  [email or web address] OR  https://www.cdc.gov/steadi/patient.html For information on Fall & Injury Prevention, visit: [email or web address] OR  [email or web address] OR  https://www.cdc.gov/steadi/patient.html

## 2023-12-14 NOTE — DISCHARGE NOTE PROVIDER - EXTENDED VTE YES NO FOR MLM ENOXAPARIN
PHYSICAL THERAPY DISCHARGE:    This patient was originally evaluated at our facility on: 11/7/18         Pt attended PT for a total of 13 Visits receiving: Manual Therapy, Therex, Postural Education, Body Mechanics Training, Home Exercise Instruction     This patient's last visit occurred on: 1/7/19      Short term goals:   1. Patient will be proficient and compliant in HEP.- MET  2. Improve hip abductor strength by 1 muscle grade in order to improve stability when walking. NT  3. Pt will be able to ambulate >/=2 blocks without an increase in symptoms.- MET    Long term goals:   1. Pt will report no difficulty putting on shoes/socks .progressed not met  2. Pt will be able to enter her home using step to gait pattern up stairs.-MET  3. Pt will be </= 47% limited in mobility according to FOTO.- NT    Pt was DC'd from our care secondary to: expiration of POC She has met at least 3/6 goals.        Therapist: Shakira Saavedra, PT, DPT  2/28/2019    
,

## 2023-12-14 NOTE — DISCHARGE NOTE NURSING/CASE MANAGEMENT/SOCIAL WORK - PATIENT PORTAL LINK FT
You can access the FollowMyHealth Patient Portal offered by [facility] by registering at the following website: [email or web address]. By joining [employer]’s FollowMyHealth portal, you will also be able to view your health information using other applications (apps) compatible with our system. You can access the FollowMyHealth Patient Portal offered by [facility] by registering at the following website: [email or web address]. By joining [employer]’s FollowMyHealth portal, you will also be able to view your health information using other applications (apps) compatible with our system.

## 2023-12-14 NOTE — PROGRESS NOTE ADULT - SUBJECTIVE AND OBJECTIVE BOX
Patient is a 53y old  Male who presents with a chief complaint of detox (14 Dec 2023 08:18)      Patient seen and examined at bedside this am, wants to go home, no longer wants medical care.     ALLERGIES:  penicillin (Unknown)    MEDICATIONS  (STANDING):  folic acid 1 milliGRAM(s) Oral daily  influenza   Vaccine 0.5 milliLiter(s) IntraMuscular once  lactated ringers. 1000 milliLiter(s) (100 mL/Hr) IV Continuous <Continuous>  LORazepam   Injectable 1 milliGRAM(s) IV Push every 6 hours  LORazepam   Injectable   IV Push   multivitamin 1 Tablet(s) Oral daily  potassium chloride    Tablet ER 20 milliEquivalent(s) Oral once  thiamine 100 milliGRAM(s) Oral daily    MEDICATIONS  (PRN):  acetaminophen     Tablet .. 650 milliGRAM(s) Oral every 6 hours PRN Temp greater or equal to 38C (100.4F), Mild Pain (1 - 3)  aluminum hydroxide/magnesium hydroxide/simethicone Suspension 30 milliLiter(s) Oral every 4 hours PRN Dyspepsia  LORazepam   Injectable 2 milliGRAM(s) IV Push every 1 hour PRN CIWA-Ar score 8 or greater  melatonin 3 milliGRAM(s) Oral at bedtime PRN Insomnia  ondansetron Injectable 4 milliGRAM(s) IV Push every 8 hours PRN Nausea and/or Vomiting    Vital Signs Last 24 Hrs  T(F): 99 (13 Dec 2023 20:03), Max: 99 (13 Dec 2023 20:03)  HR: 126 (13 Dec 2023 20:03) (126 - 127)  BP: 129/90 (13 Dec 2023 20:03) (115/82 - 129/90)  RR: 16 (13 Dec 2023 20:03) (16 - 16)  SpO2: 99% (13 Dec 2023 20:03) (98% - 99%)  I&O's Summary    BMI (kg/m2): 19.1 (12-12-23 @ 14:32)  PHYSICAL EXAM:  General: NAD, A/O x 3  ENT: MMM, no scleral icterus  Neck: Supple, No JVD  Lungs: Clear to auscultation bilaterally, no wheezes, rales, rhonchi  Cardio: RRR, S1/S2  Abdomen: Soft, Nontender, Nondistended; Bowel sounds present  Extremities: No calf tenderness, No pitting edema    LABS:                        11.8   5.21  )-----------( 70       ( 14 Dec 2023 06:04 )             34.8       12-14    136  |  102  |  11  ----------------------------<  95  3.4   |  23  |  0.75    Ca    9.1      14 Dec 2023 06:04  Phos  3.5     12-14  Mg     1.7     12-14    TPro  7.0  /  Alb  3.8  /  TBili  0.8  /  DBili  x   /  AST  68  /  ALT  71  /  AlkPhos  53  12-14                      ABG - ( 12 Dec 2023 16:20 )  pH, Arterial: 7.48  pH, Blood: x     /  pCO2: 36    /  pO2: 92    / HCO3: 27    / Base Excess: 3.3   /  SaO2: 98.8                        Urinalysis Basic - ( 14 Dec 2023 06:04 )    Color: x / Appearance: x / SG: x / pH: x  Gluc: 95 mg/dL / Ketone: x  / Bili: x / Urobili: x   Blood: x / Protein: x / Nitrite: x   Leuk Esterase: x / RBC: x / WBC x   Sq Epi: x / Non Sq Epi: x / Bacteria: x            RADIOLOGY & ADDITIONAL TESTS:    Care Discussed with Consultants/Other Providers:

## 2023-12-14 NOTE — DISCHARGE NOTE PROVIDER - HOSPITAL COURSE
.[place]   Hospital Course  HPI:  52 y/o male with PMH of etoh abuse who presented to ED requesting detox. pt states that he is been admitted 5 times (twice at [facility] and 3 times at [facility]) in last year due to etoh related admission. denies any [rior hx of DT's or withdrawal seizure. admits of drinking 2-3 beers a day with last drink 5 AM today. in ED pt received 5 mg PO diazepam and 2 mg PO ativan despite that pt remains agitated hence admission was requested  (12 Dec 2023 20:00)    Patient admitted and started CIWA protocol. On 12/14 - The patient wishes to leave against medical advice.  I have discussed the risks, benefits and alternatives (including the possibility of worsening of disease, pain, permanent disability, and/or death) with the patient.  The patient voices understanding of these risks, benefits, and alternatives and still wishes to sign out against medical advice.  The patient is awake, alert, oriented  x 3 and has demonstrated capacity to refuse/direct care.  I have advised the patient that they can and should return immediately should they develop any worse/different/additional symptoms, or if they change their mind and want to continue their care.       Discharging Provider: Ofelia Manzanares DO  Contact Info: Cell  - Please call with any questions or concerns.    [facility]    Time Spent: 45 minutes       Hospital Course  HPI:  54 y/o male with PMH of etoh abuse who presented to ED requesting detox. pt states that he is been admitted 5 times (twice at [facility] and 3 times at [facility]) in last year due to etoh related admission. denies any [rior hx of DT's or withdrawal seizure. admits of drinking 2-3 beers a day with last drink 5 AM today. in ED pt received 5 mg PO diazepam and 2 mg PO ativan despite that pt remains agitated hence admission was requested  (12 Dec 2023 20:00)    Patient admitted and started CIWA protocol. On 12/14 - The patient wishes to leave against medical advice.  I have discussed the risks, benefits and alternatives (including the possibility of worsening of disease, pain, permanent disability, and/or death) with the patient.  The patient voices understanding of these risks, benefits, and alternatives and still wishes to sign out against medical advice.  The patient is awake, alert, oriented  x 3 and has demonstrated capacity to refuse/direct care.  I have advised the patient that they can and should return immediately should they develop any worse/different/additional symptoms, or if they change their mind and want to continue their care.       Discharging Provider: Ofelia Manzanares DO  Contact Info: Cell  - Please call with any questions or concerns.    [facility]    Time Spent: 45 minutes

## 2024-01-23 ENCOUNTER — INPATIENT (INPATIENT)
Facility: HOSPITAL | Age: 54
LOS: 0 days | Discharge: LEFT BEFORE DISCHARGE | DRG: 894 | End: 2024-01-24
Attending: INTERNAL MEDICINE | Admitting: INTERNAL MEDICINE
Payer: MEDICAID

## 2024-01-23 VITALS
HEART RATE: 117 BPM | HEIGHT: 71 IN | TEMPERATURE: 98 F | SYSTOLIC BLOOD PRESSURE: 138 MMHG | OXYGEN SATURATION: 97 % | WEIGHT: 131.84 LBS | DIASTOLIC BLOOD PRESSURE: 84 MMHG | RESPIRATION RATE: 16 BRPM

## 2024-01-23 DIAGNOSIS — F10.239 ALCOHOL DEPENDENCE WITH WITHDRAWAL, UNSPECIFIED: ICD-10-CM

## 2024-01-23 LAB
ALBUMIN SERPL ELPH-MCNC: 3.3 G/DL — SIGNIFICANT CHANGE UP (ref 3.3–5)
ALP SERPL-CCNC: 63 U/L — SIGNIFICANT CHANGE UP (ref 40–120)
ALT FLD-CCNC: 247 U/L — HIGH (ref 10–45)
ANION GAP SERPL CALC-SCNC: 17 MMOL/L — SIGNIFICANT CHANGE UP (ref 5–17)
ANION GAP SERPL CALC-SCNC: 9 MMOL/L — SIGNIFICANT CHANGE UP (ref 5–17)
APPEARANCE UR: CLEAR — SIGNIFICANT CHANGE UP
AST SERPL-CCNC: 266 U/L — HIGH (ref 10–40)
BASOPHILS # BLD AUTO: 0.07 K/UL — SIGNIFICANT CHANGE UP (ref 0–0.2)
BASOPHILS NFR BLD AUTO: 1.8 % — SIGNIFICANT CHANGE UP (ref 0–2)
BILIRUB DIRECT SERPL-MCNC: 0.2 MG/DL — SIGNIFICANT CHANGE UP (ref 0–0.3)
BILIRUB INDIRECT FLD-MCNC: 0.3 MG/DL — SIGNIFICANT CHANGE UP (ref 0.2–1)
BILIRUB SERPL-MCNC: 0.5 MG/DL — SIGNIFICANT CHANGE UP (ref 0.2–1.2)
BILIRUB UR-MCNC: NEGATIVE — SIGNIFICANT CHANGE UP
BUN SERPL-MCNC: 10 MG/DL — SIGNIFICANT CHANGE UP (ref 7–23)
BUN SERPL-MCNC: 10 MG/DL — SIGNIFICANT CHANGE UP (ref 7–23)
CALCIUM SERPL-MCNC: 7.9 MG/DL — LOW (ref 8.4–10.5)
CALCIUM SERPL-MCNC: 9.1 MG/DL — SIGNIFICANT CHANGE UP (ref 8.4–10.5)
CHLORIDE SERPL-SCNC: 92 MMOL/L — LOW (ref 96–108)
CHLORIDE SERPL-SCNC: 99 MMOL/L — SIGNIFICANT CHANGE UP (ref 96–108)
CO2 SERPL-SCNC: 26 MMOL/L — SIGNIFICANT CHANGE UP (ref 22–31)
CO2 SERPL-SCNC: 32 MMOL/L — HIGH (ref 22–31)
COLOR SPEC: YELLOW — SIGNIFICANT CHANGE UP
CREAT SERPL-MCNC: 0.78 MG/DL — SIGNIFICANT CHANGE UP (ref 0.5–1.3)
CREAT SERPL-MCNC: 1.13 MG/DL — SIGNIFICANT CHANGE UP (ref 0.5–1.3)
DIFF PNL FLD: NEGATIVE — SIGNIFICANT CHANGE UP
EGFR: 107 ML/MIN/1.73M2 — SIGNIFICANT CHANGE UP
EGFR: 78 ML/MIN/1.73M2 — SIGNIFICANT CHANGE UP
EOSINOPHIL # BLD AUTO: 0.03 K/UL — SIGNIFICANT CHANGE UP (ref 0–0.5)
EOSINOPHIL NFR BLD AUTO: 0.8 % — SIGNIFICANT CHANGE UP (ref 0–6)
ETHANOL SERPL-MCNC: 385 MG/DL — HIGH (ref 0–3)
GLUCOSE SERPL-MCNC: 140 MG/DL — HIGH (ref 70–99)
GLUCOSE SERPL-MCNC: 159 MG/DL — HIGH (ref 70–99)
GLUCOSE UR QL: 100 MG/DL
HCT VFR BLD CALC: 40.5 % — SIGNIFICANT CHANGE UP (ref 39–50)
HGB BLD-MCNC: 14.5 G/DL — SIGNIFICANT CHANGE UP (ref 13–17)
IMM GRANULOCYTES NFR BLD AUTO: 0.3 % — SIGNIFICANT CHANGE UP (ref 0–0.9)
KETONES UR-MCNC: ABNORMAL MG/DL
LEUKOCYTE ESTERASE UR-ACNC: NEGATIVE — SIGNIFICANT CHANGE UP
LYMPHOCYTES # BLD AUTO: 1.22 K/UL — SIGNIFICANT CHANGE UP (ref 1–3.3)
LYMPHOCYTES # BLD AUTO: 31.9 % — SIGNIFICANT CHANGE UP (ref 13–44)
MAGNESIUM SERPL-MCNC: 1.9 MG/DL — SIGNIFICANT CHANGE UP (ref 1.6–2.6)
MAGNESIUM SERPL-MCNC: 1.9 MG/DL — SIGNIFICANT CHANGE UP (ref 1.6–2.6)
MCHC RBC-ENTMCNC: 35.5 PG — HIGH (ref 27–34)
MCHC RBC-ENTMCNC: 35.8 GM/DL — SIGNIFICANT CHANGE UP (ref 32–36)
MCV RBC AUTO: 99 FL — SIGNIFICANT CHANGE UP (ref 80–100)
MONOCYTES # BLD AUTO: 0.42 K/UL — SIGNIFICANT CHANGE UP (ref 0–0.9)
MONOCYTES NFR BLD AUTO: 11 % — SIGNIFICANT CHANGE UP (ref 2–14)
NEUTROPHILS # BLD AUTO: 2.08 K/UL — SIGNIFICANT CHANGE UP (ref 1.8–7.4)
NEUTROPHILS NFR BLD AUTO: 54.2 % — SIGNIFICANT CHANGE UP (ref 43–77)
NITRITE UR-MCNC: NEGATIVE — SIGNIFICANT CHANGE UP
NRBC # BLD: 0 /100 WBCS — SIGNIFICANT CHANGE UP (ref 0–0)
PH UR: 5.5 — SIGNIFICANT CHANGE UP (ref 5–8)
PHOSPHATE SERPL-MCNC: 3 MG/DL — SIGNIFICANT CHANGE UP (ref 2.5–4.5)
PLATELET # BLD AUTO: 106 K/UL — LOW (ref 150–400)
POTASSIUM SERPL-MCNC: 3.6 MMOL/L — SIGNIFICANT CHANGE UP (ref 3.5–5.3)
POTASSIUM SERPL-MCNC: 3.8 MMOL/L — SIGNIFICANT CHANGE UP (ref 3.5–5.3)
POTASSIUM SERPL-SCNC: 3.6 MMOL/L — SIGNIFICANT CHANGE UP (ref 3.5–5.3)
POTASSIUM SERPL-SCNC: 3.8 MMOL/L — SIGNIFICANT CHANGE UP (ref 3.5–5.3)
PROT SERPL-MCNC: 6.3 G/DL — SIGNIFICANT CHANGE UP (ref 6–8.3)
PROT UR-MCNC: NEGATIVE MG/DL — SIGNIFICANT CHANGE UP
RBC # BLD: 4.09 M/UL — LOW (ref 4.2–5.8)
RBC # FLD: 12.3 % — SIGNIFICANT CHANGE UP (ref 10.3–14.5)
SODIUM SERPL-SCNC: 135 MMOL/L — SIGNIFICANT CHANGE UP (ref 135–145)
SODIUM SERPL-SCNC: 140 MMOL/L — SIGNIFICANT CHANGE UP (ref 135–145)
SP GR SPEC: 1.01 — SIGNIFICANT CHANGE UP (ref 1–1.03)
UROBILINOGEN FLD QL: 0.2 MG/DL — SIGNIFICANT CHANGE UP (ref 0.2–1)
WBC # BLD: 3.83 K/UL — SIGNIFICANT CHANGE UP (ref 3.8–10.5)
WBC # FLD AUTO: 3.83 K/UL — SIGNIFICANT CHANGE UP (ref 3.8–10.5)

## 2024-01-23 PROCEDURE — 93010 ELECTROCARDIOGRAM REPORT: CPT

## 2024-01-23 PROCEDURE — 71045 X-RAY EXAM CHEST 1 VIEW: CPT | Mod: 26

## 2024-01-23 PROCEDURE — 99285 EMERGENCY DEPT VISIT HI MDM: CPT

## 2024-01-23 PROCEDURE — 99223 1ST HOSP IP/OBS HIGH 75: CPT

## 2024-01-23 RX ORDER — INFLUENZA VIRUS VACCINE 15; 15; 15; 15 UG/.5ML; UG/.5ML; UG/.5ML; UG/.5ML
0.5 SUSPENSION INTRAMUSCULAR ONCE
Refills: 0 | Status: DISCONTINUED | OUTPATIENT
Start: 2024-01-23 | End: 2024-01-24

## 2024-01-23 RX ORDER — MAGNESIUM OXIDE 400 MG ORAL TABLET 241.3 MG
400 TABLET ORAL
Refills: 0 | Status: DISCONTINUED | OUTPATIENT
Start: 2024-01-23 | End: 2024-01-24

## 2024-01-23 RX ORDER — DIAZEPAM 5 MG
5 TABLET ORAL ONCE
Refills: 0 | Status: DISCONTINUED | OUTPATIENT
Start: 2024-01-23 | End: 2024-01-23

## 2024-01-23 RX ORDER — THIAMINE MONONITRATE (VIT B1) 100 MG
100 TABLET ORAL DAILY
Refills: 0 | Status: DISCONTINUED | OUTPATIENT
Start: 2024-01-24 | End: 2024-01-24

## 2024-01-23 RX ORDER — SODIUM CHLORIDE 9 MG/ML
1000 INJECTION INTRAMUSCULAR; INTRAVENOUS; SUBCUTANEOUS
Refills: 0 | Status: COMPLETED | OUTPATIENT
Start: 2024-01-23 | End: 2024-01-23

## 2024-01-23 RX ORDER — SODIUM CHLORIDE 9 MG/ML
1000 INJECTION INTRAMUSCULAR; INTRAVENOUS; SUBCUTANEOUS
Refills: 0 | Status: DISCONTINUED | OUTPATIENT
Start: 2024-01-23 | End: 2024-01-23

## 2024-01-23 RX ORDER — ACETAMINOPHEN 500 MG
650 TABLET ORAL EVERY 6 HOURS
Refills: 0 | Status: DISCONTINUED | OUTPATIENT
Start: 2024-01-23 | End: 2024-01-23

## 2024-01-23 RX ORDER — ENOXAPARIN SODIUM 100 MG/ML
40 INJECTION SUBCUTANEOUS EVERY 24 HOURS
Refills: 0 | Status: DISCONTINUED | OUTPATIENT
Start: 2024-01-23 | End: 2024-01-24

## 2024-01-23 RX ORDER — FOLIC ACID 0.8 MG
1 TABLET ORAL DAILY
Refills: 0 | Status: DISCONTINUED | OUTPATIENT
Start: 2024-01-23 | End: 2024-01-24

## 2024-01-23 RX ORDER — ONDANSETRON 8 MG/1
4 TABLET, FILM COATED ORAL EVERY 8 HOURS
Refills: 0 | Status: DISCONTINUED | OUTPATIENT
Start: 2024-01-23 | End: 2024-01-24

## 2024-01-23 RX ORDER — PANTOPRAZOLE SODIUM 20 MG/1
40 TABLET, DELAYED RELEASE ORAL
Refills: 0 | Status: DISCONTINUED | OUTPATIENT
Start: 2024-01-23 | End: 2024-01-24

## 2024-01-23 RX ORDER — MAGNESIUM SULFATE 500 MG/ML
1 VIAL (ML) INJECTION ONCE
Refills: 0 | Status: COMPLETED | OUTPATIENT
Start: 2024-01-23 | End: 2024-01-23

## 2024-01-23 RX ORDER — THIAMINE MONONITRATE (VIT B1) 100 MG
100 TABLET ORAL ONCE
Refills: 0 | Status: COMPLETED | OUTPATIENT
Start: 2024-01-23 | End: 2024-01-23

## 2024-01-23 RX ORDER — SODIUM CHLORIDE 9 MG/ML
1000 INJECTION, SOLUTION INTRAVENOUS
Refills: 0 | Status: DISCONTINUED | OUTPATIENT
Start: 2024-01-23 | End: 2024-01-24

## 2024-01-23 RX ORDER — LANOLIN ALCOHOL/MO/W.PET/CERES
3 CREAM (GRAM) TOPICAL AT BEDTIME
Refills: 0 | Status: DISCONTINUED | OUTPATIENT
Start: 2024-01-23 | End: 2024-01-24

## 2024-01-23 RX ADMIN — Medication 2 MILLIGRAM(S): at 23:20

## 2024-01-23 RX ADMIN — SODIUM CHLORIDE 100 MILLILITER(S): 9 INJECTION, SOLUTION INTRAVENOUS at 22:48

## 2024-01-23 RX ADMIN — Medication 2 MILLIGRAM(S): at 15:19

## 2024-01-23 RX ADMIN — SODIUM CHLORIDE 1000 MILLILITER(S): 9 INJECTION INTRAMUSCULAR; INTRAVENOUS; SUBCUTANEOUS at 15:33

## 2024-01-23 RX ADMIN — SODIUM CHLORIDE 1000 MILLILITER(S): 9 INJECTION INTRAMUSCULAR; INTRAVENOUS; SUBCUTANEOUS at 13:20

## 2024-01-23 RX ADMIN — SODIUM CHLORIDE 1000 MILLILITER(S): 9 INJECTION INTRAMUSCULAR; INTRAVENOUS; SUBCUTANEOUS at 14:20

## 2024-01-23 RX ADMIN — Medication 1 GRAM(S): at 12:55

## 2024-01-23 RX ADMIN — Medication 100 MILLIGRAM(S): at 11:55

## 2024-01-23 RX ADMIN — Medication 100 GRAM(S): at 22:56

## 2024-01-23 RX ADMIN — SODIUM CHLORIDE 75 MILLILITER(S): 9 INJECTION INTRAMUSCULAR; INTRAVENOUS; SUBCUTANEOUS at 17:51

## 2024-01-23 RX ADMIN — SODIUM CHLORIDE 1000 MILLILITER(S): 9 INJECTION INTRAMUSCULAR; INTRAVENOUS; SUBCUTANEOUS at 14:33

## 2024-01-23 RX ADMIN — Medication 100 GRAM(S): at 11:55

## 2024-01-23 RX ADMIN — Medication 5 MILLIGRAM(S): at 12:17

## 2024-01-23 RX ADMIN — Medication 2 MILLIGRAM(S): at 21:47

## 2024-01-23 NOTE — H&P ADULT - ATTENDING COMMENTS
Seen and examined. Chart reviewed.   patient is improving clinically.   Agree with above a/p  Edited where ever is necessary Seen and examined. Chart reviewed.   patient is improving clinically.   Agree with above a/p  Edited where ever is necessary    + anxiety, transient visual hallucination, mild headaches, mild pins and needles. + tremor. + depression. no SI/HI. ok to see psych.   CIWA score now 9-10. patient was lethargic on admission after he got valium and ativan. now well alert. AAO*4. eating dinner without any issues. asking for 3 ginger Ale. cooperative. wants detox. ok with ativan taper. motivated to give up alcohol denied cig or drug use. reported h/o withdrawal seizure 15 yrs back. never been in ICU. We placed him on PRN Ativan for CIWA>8 on admission as patient was lethargic. now as he is more alert and awake and CIWA 9-10, will place on Ativan taper. LFT mildly elevated. likely related to alcohol. monitor. hydrate. on IVF. reassess in am. monitor on tele. aspiration, fall, seizure precautions. counselled about 5 min to quit alcohol or cut back less than 7 drink pe week with no more than 1 drink per day Seen and examined. Chart reviewed.   patient is improving clinically.   Agree with above a/p  Edited where ever is necessary    + anxiety, transient visual hallucination, mild headaches, mild pins and needles. + tremor. + depression. no SI/HI. ok to see psych. informed Aren Trevor PAc. [ psych]. will see in am. CIWA score now 9-10. patient was lethargic on admission after he got valium and ativan. now well alert. AAO*4. eating dinner without any issues. asking for 3 ginger Ale. cooperative. wants detox. ok with ativan taper. motivated to give up alcohol denied cig or drug use. reported h/o withdrawal seizure 15 yrs back. never been in ICU. We placed him on PRN Ativan for CIWA>8 on admission as patient was lethargic. now as he is more alert and awake and CIWA 9-10, will place on Ativan taper. LFT mildly elevated. likely related to alcohol. monitor. hydrate. on IVF. reassess in am. monitor on tele. aspiration, fall, seizure precautions. counselled about 5 min to quit alcohol or cut back less than 7 drink pe week with no more than 1 drink per day

## 2024-01-23 NOTE — ED ADULT NURSE NOTE - OBJECTIVE STATEMENT
Pt presents to the ER requesting alcohol detox. Pt states that he drinks about three "tall boys of beer" a day. Pt states his last drink was this morning at 7AM. A&OX4. Pt denies SOB, chest pain, nausea, vomiting, dizziness, or headache.

## 2024-01-23 NOTE — H&P ADULT - ASSESSMENT
53M with PMH of Etoh abuse, chronic L fibular neuropathy after fracture 2023, multiple admissions for alcohol withdrawal recent admission for alcohol withdrawal 12/12/23-12/14/23 left AMA, presents now to [facility] 1/23/24 for alcohol withdrawal.       #Alcohol withdrawal  -Usually drinks x6 25oz bottles of beer daily as per patient's girlfriend. Last drink 1 25oz beer ~10 hours prior to presentation  -Alcohol level 385  -Denies h/o intubations  -Fall & seizure precautions   -Multivitamin, thiamine 100mg, folic acid 1mg   -CIWA protocol w/ PRN ativan. Re-evaluate for need for taper currently pretty lethargic after valium 5 and ativan 2mg  -Zofran 4mg IV Q8H PRN nausea and/or vomiting  -B12: 689 on admission wnl  -F/u AM CMP, Mg, Phos  - consult    #Nausea/vomiting  -Vomited up sandwich today at home but in ED ate sandwich at bedside without issue. Benign physical exam  -Poor dentition  -CXR   -Zofran prn   -Aspiration precautions    #Transaminitis  -Mild transaminitis observed from prior admission in December  -Likely 2/2 alcohol use or nausea/vomiting  -Nausea/vomiting so far resolved, prn zofran  -Ordering CMP for am    DVT ppx: Lovenox 40 qd  Code: Patient is FULL CODE at this time, consents to chest compressions and intubation if deemed medically necessary     Case d/w Dr. Agustin 53M with PMH of Etoh abuse, chronic L fibular neuropathy after fracture 2023, multiple admissions for alcohol withdrawal recent admission for alcohol withdrawal 12/12/23-12/14/23 left AMA, presents now to [facility] 1/23/24 for alcohol withdrawal.       #Alcohol withdrawal  -Usually drinks x6 25oz bottles of beer daily as per patient's girlfriend. Last drink 1 25oz beer ~10 hours prior to presentation  -Alcohol level 385  -Denies h/o intubations  -Fall & seizure precautions   -Multivitamin, thiamine 100mg, folic acid 1mg   - We placed him on PRN Ativan for CIWA>8 on admission as patient was lethargic. now as he is more alert and awake and CIWA 9-10, placed on Ativan taper.   -Zofran 4mg IV Q8H PRN nausea and/or vomiting  -B12: 689 on admission wnl  -F/u AM CMP, Mg, Phos  - consult    #Nausea/vomiting  -Vomited up sandwich today at home but in ED ate sandwich at bedside without issue. Benign physical exam  -Poor dentition  -CXR   -Zofran prn   -Aspiration precautions    #Transaminitis  -Mild transaminitis observed from prior admission in December  -Likely 2/2 alcohol use or nausea/vomiting  -Nausea/vomiting so far resolved, prn zofran  -Ordering CMP for am    DVT ppx: Lovenox 40 qd  Code: Patient is FULL CODE at this time, consents to chest compressions and intubation if deemed medically necessary     Case d/w Dr. Agustin

## 2024-01-23 NOTE — H&P ADULT - CONVERSATION DETAILS
Patient is FULL CODE at this time, consents to chest compressions and intubation if deemed medically necessary

## 2024-01-23 NOTE — H&P ADULT - HISTORY OF PRESENT ILLNESS
53M with PMH of Etoh abuse, chronic L fibular neuropathy after fracture 2023, multiple admissions for alcohol withdrawal recent admission for alcohol withdrawal 12/12/23-12/14/23 left AMA, presents now to [facility] 1/23/24 at noon requesting alcohol detox and complaining of nausea and poor po intake x1 week Patient reports he usually drinks 25oz bottles of beer daily x3. Patient lethargic, poor historian. Obtained permission from patient to speak with his girlfriend Migdalia, who states he usually drinks x6 25oz bottles sometimes poor, but rarely takes hard liquor.In the past he has been in detox program multiple times and relapsed multiple times. For the past week he’s had a decreased appetite and weight loss and today vomited up a sandwich so he checked himself in for detox. At the time of speaking to him today he denies any pain or discomfort. He tolerated a sandwich well without nausea/vomiting.    In the ED patient was normotensive, tachycardic to 117, afebrile, saturating well and comfortably on room air.    Relevant labs: Glucose 159, AST 68, ALT 71. Folate and B12 wnl. Alcohol level 385   In the ED he received 5m valium, 2mg ativan, 1mg magnesium, 100mg thiamine    EKG personally reviewed NSR no ST changes or TW inversions

## 2024-01-23 NOTE — ED ADULT NURSE NOTE - SKIN TEMPERATURE MOISTURE
History  Chief Complaint   Patient presents with    Arm Pain     Pt c/o intermittant  left arm pain started 20 minutes ago  History of MI in October  Pt with c/o intermittent left forearm pain that started 20mins prior to arrival  Pt states that the pain feels liike a "hammer hitting him"  He states that this is the same pain he had in his foream when he had an MI in Oct, but at that time it was accompanied with chest pain  Pt denies other somatic complaints            Prior to Admission Medications   Prescriptions Last Dose Informant Patient Reported? Taking?   aspirin (ECOTRIN LOW STRENGTH) 81 mg EC tablet 5/10/2018 at Unknown time  Yes Yes   Sig: Take 81 mg by mouth daily   clopidogrel (PLAVIX) 75 mg tablet 5/10/2018 at Unknown time  Yes Yes   Sig: Take 75 mg by mouth daily   lisinopril (ZESTRIL) 2 5 mg tablet 5/10/2018 at Unknown time  Yes Yes   Sig: Take 2 5 mg by mouth daily   metoprolol tartrate (LOPRESSOR) 50 mg tablet 5/10/2018 at Unknown time  Yes Yes   Sig: Take 50 mg by mouth every 12 (twelve) hours   pravastatin (PRAVACHOL) 40 mg tablet 5/10/2018 at Unknown time  Yes Yes   Sig: Take 40 mg by mouth daily   ranitidine (ZANTAC) 150 MG capsule 5/10/2018 at Unknown time  No Yes   Sig: Take 1 capsule (150 mg total) by mouth 2 (two) times a day      Facility-Administered Medications: None       Past Medical History:   Diagnosis Date    Complete tear of rotator cuff 04/01/2008    Hx of heart artery stent     MI (myocardial infarction) ([facility] [address] )        History reviewed  No pertinent surgical history  Family History   Problem Relation Age of Onset    Leukemia Mother     Cancer Mother     Cancer Father      I have reviewed and agree with the history as documented      Social History   Substance Use Topics    Smoking status: Former Smoker    Smokeless tobacco: Never Used      Comment: per allscripts current every day smoker    Alcohol use No      Comment: occasional        Review of Systems Constitutional: Negative for chills and fever  Cardiovascular: Negative for chest pain and palpitations  Musculoskeletal: Negative for joint swelling and myalgias  All other systems reviewed and are negative  Physical Exam  ED Triage Vitals [05/11/18 0136]   Temperature Pulse Respirations Blood Pressure SpO2   98 8 °F (37 1 °C) 87 16 167/90 97 %      Temp Source Heart Rate Source Patient Position - Orthostatic VS BP Location FiO2 (%)   Tympanic Monitor Sitting Right arm --      Pain Score       2           Orthostatic Vital Signs  Vitals:    05/11/18 0222 05/11/18 0225 05/11/18 0343 05/11/18 0415   BP: 130/73 122/69 130/70 110/66   Pulse: 83 83 75 60   Patient Position - Orthostatic VS: Sitting Sitting Sitting Sitting       Physical Exam   Constitutional: He is oriented to person, place, and time  He appears well-developed and well-nourished  HENT:   Head: Normocephalic and atraumatic  Eyes: EOM are normal  Pupils are equal, round, and reactive to light  Cardiovascular: Normal rate, regular rhythm and normal heart sounds  Pulmonary/Chest: Effort normal and breath sounds normal    Abdominal: Soft  Bowel sounds are normal  He exhibits no distension and no mass  There is no tenderness  There is no rebound and no guarding  Neurological: He is alert and oriented to person, place, and time  Skin: Skin is warm and dry  Psychiatric: He has a normal mood and affect  His behavior is normal  Judgment and thought content normal    Nursing note and vitals reviewed        ED Medications  Medications   nitroglycerin (NITROSTAT) SL tablet 0 4 mg (0 4 mg Sublingual Given 5/11/18 0225)   aspirin tablet 325 mg (325 mg Oral Given 5/11/18 0230)       Diagnostic Studies  Results Reviewed     Procedure Component Value Units Date/Time    Troponin I [[id number]]  (Normal) Collected:  05/11/18 0142    Lab Status:  Final result Specimen:  Blood from Arm, Right Updated:  05/11/18 0210     Troponin I <0 02 ng/mL Narrative:         [employer] Chemistry analyzer 99% cutoff is > 0 04 ng/mL in network labs    o cTnI 99% cutoff is useful only when applied to patients in the clinical setting of myocardial ischemia  o cTnI 99% cutoff should be interpreted in the context of clinical history, ECG findings and possibly cardiac imaging to establish correct diagnosis  o cTnI 99% cutoff may be suggestive but clearly not indicative of a coronary event without the clinical setting of myocardial ischemia  Comprehensive metabolic panel [[id number]] Collected:  05/11/18 0142    Lab Status:  Final result Specimen:  Blood from Arm, Right Updated:  05/11/18 5973     Sodium 139 mmol/L      Potassium 3 5 mmol/L      Chloride 102 mmol/L      CO2 29 mmol/L      Anion Gap 8 mmol/L      BUN 19 mg/dL      Creatinine 1 15 mg/dL      Glucose 111 mg/dL      Calcium 9 4 mg/dL      AST 20 U/L      ALT 43 U/L      Alkaline Phosphatase 62 U/L      Total Protein 7 7 g/dL      Albumin 3 8 g/dL      Total Bilirubin 0 30 mg/dL      eGFR 71 ml/min/1 73sq m     Narrative:         National Kidney Disease Education Program recommendations are as follows:  GFR calculation is accurate only with a steady state creatinine  Chronic Kidney disease less than 60 ml/min/1 73 sq  meters  Kidney failure less than 15 ml/min/1 73 sq  meters      CBC and differential [[id number]]  (Abnormal) Collected:  05/11/18 0142    Lab Status:  Final result Specimen:  Blood from Arm, Right Updated:  05/11/18 0151     WBC 10 20 Thousand/uL      RBC 4 76 Million/uL      Hemoglobin 14 3 g/dL      Hematocrit 44 2 %      MCV 93 fL      MCH 30 2 pg      MCHC 32 4 g/dL      RDW 14 7 %      MPV 7 4 (L) fL      Platelets 427 Thousands/uL      nRBC 0 /100 WBCs      Neutrophils Relative 51 %      Lymphocytes Relative 35 %      Monocytes Relative 8 %      Eosinophils Relative 5 %      Basophils Relative 1 %      Neutrophils Absolute 5 20 Thousands/µL      Lymphocytes Absolute 3 60 Thousands/µL Monocytes Absolute 0 80 Thousand/µL      Eosinophils Absolute 0 50 Thousand/µL      Basophils Absolute 0 10 Thousands/µL                  XR chest 1 view portable    (Results Pending)              Procedures  ECG 12 Lead Documentation  Date/Time: 5/11/2018 1:45 AM  Performed by: Guerrero Mi by: Dana Keen     Indications / Diagnosis:  Arm pain  ECG reviewed by me, the ED Provider: yes    Patient location:  Bedside  Previous ECG:     Previous ECG:  Unavailable    Comparison to cardiac monitor: Yes    Interpretation:     Interpretation: normal    Rate:     ECG rate:  72bpm    ECG rate assessment: normal    Rhythm:     Rhythm: sinus rhythm             Phone Contacts  ED Phone Contact    ED Course                               MDM  Number of Diagnoses or Management Options  Chest pain:   Diagnosis management comments: Pt is pain free after 3 s/l NTG  I am concerned that this is pt's anginal equivalent   Will obs for atypical chest pain       Amount and/or Complexity of Data Reviewed  Clinical lab tests: ordered and reviewed  Tests in the radiology section of CPT®: ordered and reviewed    Risk of Complications, Morbidity, and/or Mortality  Presenting problems: moderate  Diagnostic procedures: moderate  Management options: moderate    Patient Progress  Patient progress: stable    CritCare Time    Disposition  Final diagnoses:   Chest pain     Time reflects when diagnosis was documented in both MDM as applicable and the Disposition within this note     Time User Action Codes Description Comment    5/11/2018  2:44 AM Bobbette Shells A Add [I25 110] Coronary artery disease involving native coronary artery of native heart with unstable angina pectoris ([facility] [address] )     5/11/2018  2:44 AM Bobbette Shells A Modify [I25 110] Coronary artery disease involving native coronary artery of native heart with unstable angina pectoris ([facility] [address] )     5/11/2018  2:44 AM Lizette Seth Add [R07 9] Chest pain       ED Disposition     ED Disposition Condition Comment    Admit  Case was discussed with Dr Sheila Fritz and the patient's admission status was agreed to be Admission Status: observation status to the service of Dr Sheila Fritz   Follow-up Information    None       Patient's Medications   Discharge Prescriptions    No medications on file     No discharge procedures on file      ED Provider  Electronically Signed by           Meli Chino DO  05/11/18 6978 warm

## 2024-01-23 NOTE — ED ADULT NURSE NOTE - NSFALLRISKINTERV_ED_ALL_ED
Assistance OOB with selected safe patient handling equipment if applicable/Assistance with ambulation/Communicate fall risk and risk factors to all staff, patient, and family/Monitor gait and stability/Monitor for mental status changes and reorient to person, place, and time, as needed/Provide visual cue: yellow wristband, yellow gown, etc/Reinforce activity limits and safety measures with patient and family/Toileting schedule using arm’s reach rule for commode and bathroom/Use of alarms - bed, stretcher, chair and/or video monitoring/Call bell, personal items and telephone in reach/Instruct patient to call for assistance before getting out of bed/chair/stretcher/Non-slip footwear applied when patient is off stretcher/[place] to call system/Physically safe environment - no spills, clutter or unnecessary equipment/Purposeful Proactive Rounding/Room/bathroom lighting operational, light cord in reach

## 2024-01-23 NOTE — ED PROVIDER NOTE - PHYSICAL EXAMINATION
General:     NAD, well-nourished, well-appearing, Patient looks anxious  Head:     NC/AT, EOMI, oral mucosa dry Positive tongue tremors  Neck:     trachea midline  Lungs:     CTA b/l, no w/r/r  CVS:     S1S2, RRR, no m/g/r  Abd:     +BS, s/nt/nd, no organomegaly  Ext:    2+ radial and pedal pulses, no c/c/e  Positive upper extremity tremors  Neuro: AAOx3, no sensory/motor deficits

## 2024-01-23 NOTE — ED PROVIDER NOTE - CLINICAL SUMMARY MEDICAL DECISION MAKING FREE TEXT BOX
53-year-old male with past history of alcohol abuse came to the emergency room requesting alcohol detox patient stated that he has been drinking every day his last drink was today this morning patient is shaking and anxious patient's CIWA score is equal to 4 patient stated that he had history of seizures that explains her complaint: Problem the past has been in detox program multiple times and relapsed multiple times  CBC CMP IV fluids thiamine magnesium ordered also Valium 5 mg p.o. 53-year-old male with past history of alcohol abuse came to the emergency room requesting alcohol detox patient stated that he has been drinking every day his last drink was today this morning patient is shaking and anxious patient's CIWA score is equal to 4 patient stated that he had history of seizures that explains her complaint: Problem the past has been in detox program multiple times and relapsed multiple times  CBC CMP IV fluids thiamine magnesium ordered also Valium 5 mg p.o.  Patient alcohol level is 385 patient was given Valium 5 mg repeat CIWA was 5

## 2024-01-23 NOTE — ED ADULT TRIAGE NOTE - CHIEF COMPLAINT QUOTE
Pt here alcohol detox.  Last drink 7a 1 beer.  Pt has had seizures in the past from alcohol withdrawal.

## 2024-01-23 NOTE — ED PROVIDER NOTE - CARE PLAN
Goal:	Alcohol withdrawal,   Principal Discharge DX:	Alcohol dependence with withdrawal  Goal:	Alcohol withdrawal,  Secondary Diagnosis:	History of seizure due to alcohol withdrawal   1

## 2024-01-23 NOTE — H&P ADULT - NSHPPHYSICALEXAM_GEN_ALL_CORE
PHYSICAL EXAM:  Vital Signs Last 24 Hrs  T(C): 36.9 (23 Jan 2024 11:24), Max: 36.9 (23 Jan 2024 11:24)  T(F): 98.4 (23 Jan 2024 11:24), Max: 98.4 (23 Jan 2024 11:24)  HR: 91 (23 Jan 2024 14:56) (81 - 117)  BP: 140/84 (23 Jan 2024 14:56) (134/88 - 140/84)  BP(mean): --  RR: 16 (23 Jan 2024 14:56) (16 - 16)  SpO2: 94% (23 Jan 2024 14:56) (94% - 97%)    Parameters below as of 23 Jan 2024 14:56  Patient On (Oxygen Delivery Method): room air        PHYSICAL EXAM:  GENERAL: NAD, lying in bed comfortably  HEAD:  Atraumatic, Normocephalic  EYES: EOMI, PERRLA, conjunctiva and sclera clear  ENT: Moist mucous membranes  NECK: Supple  RESP: Clear to auscultation bilaterally, good air entry bilaterally; No wheezing, rales, or rhonchi. Unlabored respirations  CARDIAC: Regular rate and rhythm. S1 and S2. No murmurs, rubs, or gallops  GI:  Soft, Nontender, Nondistended. Bowel sounds present x4 quadrants; No hepatomegaly. No splenomegaly.  EXTREMITIES:  2+ Peripheral Pulses. Capillary refill <2 seconds. No clubbing, cyanosis, or edema  NERVOUS SYSTEM:  Alert & Oriented X3, speech clear. No deficits   MSK: FROM all 4 extremities, full and equal strength  SKIN: No rashes, bruises, or other lesions

## 2024-01-23 NOTE — H&P ADULT - NSHPLABSRESULTS_GEN_ALL_CORE
14.5   3.83  )-----------( 106      ( 2024 11:55 )             40.5     2024 11:55    135    |  92     |  10     ----------------------------<  159    3.6     |  26     |  0.78     Ca    9.1        2024 11:55  Mg     1.9       2024 11:55        CAPILLARY BLOOD GLUCOSE          Urinalysis Basic - ( 2024 14:52 )    Color: Yellow / Appearance: Clear / S.007 / pH: x  Gluc: x / Ketone: Trace mg/dL  / Bili: Negative / Urobili: 0.2 mg/dL   Blood: x / Protein: Negative mg/dL / Nitrite: Negative   Leuk Esterase: Negative / RBC: x / WBC x   Sq Epi: x / Non Sq Epi: x / Bacteria: x

## 2024-01-23 NOTE — PATIENT PROFILE ADULT - DO YOU FEEL THREATENED BY OTHERS?
Airway  Urgency: elective    Date/Time: 4/29/2021 2:27 PM  Airway not difficult    General Information and Staff    Patient location during procedure: OR  Anesthesiologist: Med Gonzalez MD  CRNA: Silvia Naranjo CRNA    Indications and Patient Condition  Indications for airway management: airway protection    Preoxygenated: yes  Mask difficulty assessment: 1 - vent by mask    Final Airway Details  Final airway type: endotracheal airway      Successful airway: ETT  Cuffed: yes   Successful intubation technique: direct laryngoscopy  Facilitating devices/methods: intubating stylet  Endotracheal tube insertion site: oral  Blade: Harman  Blade size: 3  ETT size (mm): 7.5  Cormack-Lehane Classification: grade I - full view of glottis  Placement verified by: chest auscultation and capnometry   Cuff volume (mL): 8  Measured from: lips  ETT/EBT  to lips (cm): 22  Number of attempts at approach: 1  Assessment: lips, teeth, and gum same as pre-op and atraumatic intubation               no

## 2024-01-23 NOTE — PATIENT PROFILE ADULT - NSPROPASSIVESMOKEEXPOSURE_GEN_A_NUR
The patients Vancomycin therapy has been completed / discontinued  Thank you for this consult; Pharmacy will sign-off now      Layla Baldwin PharmD No

## 2024-01-24 ENCOUNTER — TRANSCRIPTION ENCOUNTER (OUTPATIENT)
Age: 54
End: 2024-01-24

## 2024-01-24 VITALS
DIASTOLIC BLOOD PRESSURE: 98 MMHG | HEART RATE: 97 BPM | TEMPERATURE: 98 F | RESPIRATION RATE: 18 BRPM | OXYGEN SATURATION: 94 % | SYSTOLIC BLOOD PRESSURE: 139 MMHG

## 2024-01-24 DIAGNOSIS — F19.94 OTHER PSYCHOACTIVE SUBSTANCE USE, UNSPECIFIED WITH PSYCHOACTIVE SUBSTANCE-INDUCED MOOD DISORDER: ICD-10-CM

## 2024-01-24 LAB
A1C WITH ESTIMATED AVERAGE GLUCOSE RESULT: 4.8 % — SIGNIFICANT CHANGE UP (ref 4–5.6)
ALBUMIN SERPL ELPH-MCNC: 3.5 G/DL — SIGNIFICANT CHANGE UP (ref 3.3–5)
ALP SERPL-CCNC: 62 U/L — SIGNIFICANT CHANGE UP (ref 40–120)
ALT FLD-CCNC: 217 U/L — HIGH (ref 10–45)
ANION GAP SERPL CALC-SCNC: 10 MMOL/L — SIGNIFICANT CHANGE UP (ref 5–17)
ANION GAP SERPL CALC-SCNC: 10 MMOL/L — SIGNIFICANT CHANGE UP (ref 5–17)
AST SERPL-CCNC: 206 U/L — HIGH (ref 10–40)
BILIRUB SERPL-MCNC: 0.8 MG/DL — SIGNIFICANT CHANGE UP (ref 0.2–1.2)
BUN SERPL-MCNC: 10 MG/DL — SIGNIFICANT CHANGE UP (ref 7–23)
BUN SERPL-MCNC: 10 MG/DL — SIGNIFICANT CHANGE UP (ref 7–23)
CALCIUM SERPL-MCNC: 8.2 MG/DL — LOW (ref 8.4–10.5)
CALCIUM SERPL-MCNC: 8.3 MG/DL — LOW (ref 8.4–10.5)
CHLORIDE SERPL-SCNC: 99 MMOL/L — SIGNIFICANT CHANGE UP (ref 96–108)
CHLORIDE SERPL-SCNC: 99 MMOL/L — SIGNIFICANT CHANGE UP (ref 96–108)
CHOLEST SERPL-MCNC: 233 MG/DL — HIGH
CO2 SERPL-SCNC: 27 MMOL/L — SIGNIFICANT CHANGE UP (ref 22–31)
CO2 SERPL-SCNC: 27 MMOL/L — SIGNIFICANT CHANGE UP (ref 22–31)
CREAT SERPL-MCNC: 0.7 MG/DL — SIGNIFICANT CHANGE UP (ref 0.5–1.3)
CREAT SERPL-MCNC: 0.74 MG/DL — SIGNIFICANT CHANGE UP (ref 0.5–1.3)
EGFR: 109 ML/MIN/1.73M2 — SIGNIFICANT CHANGE UP
EGFR: 111 ML/MIN/1.73M2 — SIGNIFICANT CHANGE UP
ESTIMATED AVERAGE GLUCOSE: 91 MG/DL — SIGNIFICANT CHANGE UP (ref 68–114)
GLUCOSE SERPL-MCNC: 119 MG/DL — HIGH (ref 70–99)
GLUCOSE SERPL-MCNC: 119 MG/DL — HIGH (ref 70–99)
HCT VFR BLD CALC: 34.4 % — LOW (ref 39–50)
HDLC SERPL-MCNC: 120 MG/DL — SIGNIFICANT CHANGE UP
HGB BLD-MCNC: 11.9 G/DL — LOW (ref 13–17)
LIPID PNL WITH DIRECT LDL SERPL: 105 MG/DL — HIGH
MAGNESIUM SERPL-MCNC: 1.9 MG/DL — SIGNIFICANT CHANGE UP (ref 1.6–2.6)
MCHC RBC-ENTMCNC: 34.6 GM/DL — SIGNIFICANT CHANGE UP (ref 32–36)
MCHC RBC-ENTMCNC: 35.3 PG — HIGH (ref 27–34)
MCV RBC AUTO: 102.1 FL — HIGH (ref 80–100)
NON HDL CHOLESTEROL: 113 MG/DL — SIGNIFICANT CHANGE UP
NRBC # BLD: 0 /100 WBCS — SIGNIFICANT CHANGE UP (ref 0–0)
PHOSPHATE SERPL-MCNC: 2.3 MG/DL — LOW (ref 2.5–4.5)
PLATELET # BLD AUTO: 86 K/UL — LOW (ref 150–400)
POTASSIUM SERPL-MCNC: 3.5 MMOL/L — SIGNIFICANT CHANGE UP (ref 3.5–5.3)
POTASSIUM SERPL-MCNC: 3.5 MMOL/L — SIGNIFICANT CHANGE UP (ref 3.5–5.3)
POTASSIUM SERPL-SCNC: 3.5 MMOL/L — SIGNIFICANT CHANGE UP (ref 3.5–5.3)
POTASSIUM SERPL-SCNC: 3.5 MMOL/L — SIGNIFICANT CHANGE UP (ref 3.5–5.3)
PROT SERPL-MCNC: 6.4 G/DL — SIGNIFICANT CHANGE UP (ref 6–8.3)
RAPID RVP RESULT: SIGNIFICANT CHANGE UP
RBC # BLD: 3.37 M/UL — LOW (ref 4.2–5.8)
RBC # FLD: 12.7 % — SIGNIFICANT CHANGE UP (ref 10.3–14.5)
SARS-COV-2 RNA SPEC QL NAA+PROBE: SIGNIFICANT CHANGE UP
SODIUM SERPL-SCNC: 136 MMOL/L — SIGNIFICANT CHANGE UP (ref 135–145)
SODIUM SERPL-SCNC: 136 MMOL/L — SIGNIFICANT CHANGE UP (ref 135–145)
TRIGL SERPL-MCNC: 51 MG/DL — SIGNIFICANT CHANGE UP
WBC # BLD: 5.22 K/UL — SIGNIFICANT CHANGE UP (ref 3.8–10.5)
WBC # FLD AUTO: 5.22 K/UL — SIGNIFICANT CHANGE UP (ref 3.8–10.5)

## 2024-01-24 PROCEDURE — 85027 COMPLETE CBC AUTOMATED: CPT

## 2024-01-24 PROCEDURE — 80053 COMPREHEN METABOLIC PANEL: CPT

## 2024-01-24 PROCEDURE — 85025 COMPLETE CBC W/AUTO DIFF WBC: CPT

## 2024-01-24 PROCEDURE — 96361 HYDRATE IV INFUSION ADD-ON: CPT

## 2024-01-24 PROCEDURE — 96375 TX/PRO/DX INJ NEW DRUG ADDON: CPT

## 2024-01-24 PROCEDURE — 96365 THER/PROPH/DIAG IV INF INIT: CPT

## 2024-01-24 PROCEDURE — 71045 X-RAY EXAM CHEST 1 VIEW: CPT

## 2024-01-24 PROCEDURE — 80076 HEPATIC FUNCTION PANEL: CPT

## 2024-01-24 PROCEDURE — 83036 HEMOGLOBIN GLYCOSYLATED A1C: CPT

## 2024-01-24 PROCEDURE — 99233 SBSQ HOSP IP/OBS HIGH 50: CPT | Mod: GC

## 2024-01-24 PROCEDURE — 80061 LIPID PANEL: CPT

## 2024-01-24 PROCEDURE — 83735 ASSAY OF MAGNESIUM: CPT

## 2024-01-24 PROCEDURE — 84100 ASSAY OF PHOSPHORUS: CPT

## 2024-01-24 PROCEDURE — 0225U NFCT DS DNA&RNA 21 SARSCOV2: CPT

## 2024-01-24 PROCEDURE — 96374 THER/PROPH/DIAG INJ IV PUSH: CPT

## 2024-01-24 PROCEDURE — 93005 ELECTROCARDIOGRAM TRACING: CPT

## 2024-01-24 PROCEDURE — 99232 SBSQ HOSP IP/OBS MODERATE 35: CPT

## 2024-01-24 PROCEDURE — 36415 COLL VENOUS BLD VENIPUNCTURE: CPT

## 2024-01-24 PROCEDURE — 90792 PSYCH DIAG EVAL W/MED SRVCS: CPT

## 2024-01-24 PROCEDURE — 80307 DRUG TEST PRSMV CHEM ANLYZR: CPT

## 2024-01-24 PROCEDURE — 80048 BASIC METABOLIC PNL TOTAL CA: CPT

## 2024-01-24 PROCEDURE — 99285 EMERGENCY DEPT VISIT HI MDM: CPT

## 2024-01-24 RX ORDER — SODIUM,POTASSIUM PHOSPHATES 278-250MG
1 POWDER IN PACKET (EA) ORAL ONCE
Refills: 0 | Status: COMPLETED | OUTPATIENT
Start: 2024-01-24 | End: 2024-01-24

## 2024-01-24 RX ORDER — FOLIC ACID 0.8 MG
1 TABLET ORAL
Qty: 30 | Refills: 0
Start: 2024-01-24 | End: 2024-02-22

## 2024-01-24 RX ORDER — POLYETHYLENE GLYCOL 3350 17 G/17G
17 POWDER, FOR SOLUTION ORAL DAILY
Refills: 0 | Status: DISCONTINUED | OUTPATIENT
Start: 2024-01-24 | End: 2024-01-24

## 2024-01-24 RX ADMIN — Medication 1 MILLIGRAM(S): at 13:05

## 2024-01-24 RX ADMIN — Medication 50 MILLIGRAM(S): at 13:04

## 2024-01-24 RX ADMIN — ENOXAPARIN SODIUM 40 MILLIGRAM(S): 100 INJECTION SUBCUTANEOUS at 05:31

## 2024-01-24 RX ADMIN — PANTOPRAZOLE SODIUM 40 MILLIGRAM(S): 20 TABLET, DELAYED RELEASE ORAL at 05:31

## 2024-01-24 RX ADMIN — MAGNESIUM OXIDE 400 MG ORAL TABLET 400 MILLIGRAM(S): 241.3 TABLET ORAL at 13:07

## 2024-01-24 RX ADMIN — SODIUM CHLORIDE 100 MILLILITER(S): 9 INJECTION, SOLUTION INTRAVENOUS at 07:37

## 2024-01-24 RX ADMIN — Medication 2 MILLIGRAM(S): at 05:31

## 2024-01-24 RX ADMIN — Medication 2 MILLIGRAM(S): at 03:20

## 2024-01-24 RX ADMIN — MAGNESIUM OXIDE 400 MG ORAL TABLET 400 MILLIGRAM(S): 241.3 TABLET ORAL at 10:13

## 2024-01-24 RX ADMIN — Medication 100 MILLIGRAM(S): at 13:06

## 2024-01-24 RX ADMIN — Medication 1 TABLET(S): at 13:06

## 2024-01-24 RX ADMIN — Medication 2 MILLIGRAM(S): at 11:15

## 2024-01-24 RX ADMIN — Medication 2 MILLIGRAM(S): at 07:29

## 2024-01-24 RX ADMIN — Medication 2 MILLIGRAM(S): at 14:50

## 2024-01-24 NOTE — DISCHARGE NOTE PROVIDER - ATTENDING DISCHARGE PHYSICAL EXAMINATION:
A+Ox3, no FND, no murmurs, lungs CTA b/l, abd NT/ND, no lower extremity swelling, + tremors, + tongue fasciculations.

## 2024-01-24 NOTE — CHART NOTE - NSCHARTNOTEFT_GEN_A_CORE
Received a call from the pt's nurse stating that the patient wants to leave against medical advice. Dr. Sanchez spoke to the pt, who remained A+Ox3 throughout his hospital course and he understands the risks associated with leaving against medical advice. The patient is in alcohol withdrawal which can lead to adverse events including hallucinations, seizures, and even death. Pt is willing to accept these risks if the patient leaves against medical advice. AMA paperwork completed at the bedside and witnessed by RN.
Vital Signs Last 24 Hrs  T(C): 37.4 (23 Jan 2024 20:09), Max: 37.4 (23 Jan 2024 20:09)  T(F): 99.3 (23 Jan 2024 20:09), Max: 99.3 (23 Jan 2024 20:09)  HR: 114 (23 Jan 2024 20:09) (81 - 117)  BP: 120/74 (23 Jan 2024 20:09) (120/74 - 140/84)  BP(mean): --  RR: 17 (23 Jan 2024 20:09) (16 - 17)  SpO2: 98% (23 Jan 2024 20:09) (94% - 98%)    Parameters below as of 23 Jan 2024 20:09  Patient On (Oxygen Delivery Method): room air    01-23    140  |  99  |  10  ----------------------------<  140<H>  3.8   |  32<H>  |  1.13    Ca    7.9<L>      23 Jan 2024 19:59  Phos  3.0     01-23  Mg     1.9     01-23    TPro  6.3  /  Alb  3.3  /  TBili  0.5  /  DBili  0.2  /  AST  266<H>  /  ALT  247<H>  /  AlkPhos  63  01-23    tachycardia. creatinine and LFT is trending up abruptly. dced NS@75 ml/hr. changed to LR@100 mls/hr. ordered EKG. informed RN to inform  resident to see EKG once done. Mg 1.9 ordered mg 1 gm IV and PO mg TID. needs to keep Mg>2. patient with alcohol abuse usually mg depleted intracellularly. c/w tele monitor.  LFT jumped up. patient is on Ativan taper. likely related to dehydration, repeat COMP in am. patient is on Ativan. It rarely cause hepatoxicity.  RUQ USG if LFT trending up. check hepatitis panel. low grade temp. no ssx of infection. check RVP/ANJELICA. CXR today none acute. dced tylenol for transaminitis.    informed Dr. Whitley Mchugh [  resident on call]

## 2024-01-24 NOTE — DISCHARGE NOTE PROVIDER - HOSPITAL COURSE
53M with PMH of Etoh abuse, chronic L fibular neuropathy after fracture 2023, multiple admissions for alcohol withdrawal recent admission for alcohol withdrawal 12/12/23-12/14/23 left AMA, presents now to [facility] 1/23/24 at noon requesting alcohol detox and complaining of nausea and poor po intake x1 week Patient reports he usually drinks 25oz bottles of beer daily x3. Patient lethargic, poor historian. Obtained permission from patient to speak with his girlfriend Migdalia, who states he usually drinks x6 25oz bottles sometimes poor, but rarely takes hard liquor. In the past he has been in detox program multiple times and relapsed multiple times. For the past week he’s had a decreased appetite and weight loss and today vomited up a sandwich so he checked himself in for detox. Patient was monitored, initially started on ativan po, then transitioned to librium po taper 1/24/24. Later on in the day 1/24/24 patient signed out AMA, stating he was too bored in the hospital and the beds were uncomfortable. He will go back home and follow with his primary doctors and neurologist.     PHYSICAL EXAM:  Vital Signs Last 24 Hrs  T(C): 36.8 (24 Jan 2024 14:38), Max: 37.4 (23 Jan 2024 20:09)  T(F): 98.2 (24 Jan 2024 14:38), Max: 99.3 (23 Jan 2024 20:09)  HR: 97 (24 Jan 2024 14:38) (89 - 114)  BP: 139/98 (24 Jan 2024 14:38) (120/74 - 139/98)  RR: 18 (24 Jan 2024 14:38) (16 - 18)  SpO2: 94% (24 Jan 2024 14:38) (94% - 98%)    Parameters below as of 24 Jan 2024 14:38  Patient On (Oxygen Delivery Method): room air    PHYSICAL EXAM:  GENERAL: NAD, lying in bed comfortably  HEAD:  Atraumatic, Normocephalic  EYES: EOMI, PERRLA, conjunctiva and sclera clear  ENT: Moist mucous membranes  NECK: Supple, No JVD  RESP: Clear to auscultation bilaterally, good air entry bilaterally; No wheezing, rales, or rhonchi. Unlabored respirations  CARDIAC: Regular rate and rhythm. S1 and S2. No murmurs, rubs, or gallops  GI:  Soft, Nontender, Nondistended. Bowel sounds present x4 quadrants; No hepatomegaly. No splenomegaly.  EXTREMITIES:  2+ Peripheral Pulses. Capillary refill <2 seconds. No clubbing, cyanosis, or edema  NERVOUS SYSTEM:  Alert & Oriented X3, speech clear. No deficits   MSK: FROM all 4 extremities, full and equal strength  SKIN: No rashes, bruises, or other lesions 53M with PMH of Etoh abuse, chronic L fibular neuropathy after fracture 2023, multiple admissions for alcohol withdrawal recent admission for alcohol withdrawal 12/12/23-12/14/23 left AMA, presents now to [facility] 1/23/24 at noon requesting alcohol detox and complaining of nausea and poor po intake x1 week Patient reports he usually drinks 25oz bottles of beer daily x3. Patient lethargic, poor historian. Obtained permission from patient to speak with his girlfriend Migdalia, who states he usually drinks x6 25oz bottles sometimes poor, but rarely takes hard liquor. In the past he has been in detox program multiple times and relapsed multiple times. For the past week he’s had a decreased appetite and weight loss and today vomited up a sandwich so he checked himself in for detox. Patient was monitored, initially started on ativan po, then transitioned to librium po taper 1/24/24. Later on in the day 1/24/24 patient signed out AMA, stating he was too bored in the hospital and the beds were uncomfortable. He will go back home and follow with his primary doctors and neurologist. He has full understanding of the risks and benefits of leaving AMA, risks including worsening of symptoms, seizure, permanent disability and death, he is AAOx3.      PHYSICAL EXAM:  Vital Signs Last 24 Hrs  T(C): 36.8 (24 Jan 2024 14:38), Max: 37.4 (23 Jan 2024 20:09)  T(F): 98.2 (24 Jan 2024 14:38), Max: 99.3 (23 Jan 2024 20:09)  HR: 97 (24 Jan 2024 14:38) (89 - 114)  BP: 139/98 (24 Jan 2024 14:38) (120/74 - 139/98)  RR: 18 (24 Jan 2024 14:38) (16 - 18)  SpO2: 94% (24 Jan 2024 14:38) (94% - 98%)    Parameters below as of 24 Jan 2024 14:38  Patient On (Oxygen Delivery Method): room air    PHYSICAL EXAM:  GENERAL: NAD, lying in bed comfortably  HEAD:  Atraumatic, Normocephalic  EYES: EOMI, PERRLA, conjunctiva and sclera clear  ENT: Moist mucous membranes  NECK: Supple, No JVD  RESP: Clear to auscultation bilaterally, good air entry bilaterally; No wheezing, rales, or rhonchi. Unlabored respirations  CARDIAC: Regular rate and rhythm. S1 and S2. No murmurs, rubs, or gallops  GI:  Soft, Nontender, Nondistended. Bowel sounds present x4 quadrants; No hepatomegaly. No splenomegaly.  EXTREMITIES:  2+ Peripheral Pulses. Capillary refill <2 seconds. No clubbing, cyanosis, or edema  NERVOUS SYSTEM:  Alert & Oriented X3, speech clear. No deficits   MSK: FROM all 4 extremities, full and equal strength  SKIN: No rashes, bruises, or other lesions

## 2024-01-24 NOTE — SBIRT NOTE ADULT - NSSBIRTALCACTIVEREFTXDET_GEN_A_CORE
SW to continue to follow; pt has been in multiple outpatient and inpatient rehab facilities for substance use.

## 2024-01-24 NOTE — BH CONSULTATION LIAISON ASSESSMENT NOTE - NSBHSACONSEQUENCE_PSY_A_CORE FT
Patient reports longstanding and disorganized substance use history including multiple detox programs. Patient also reports history of IOP at [facility] in Nov 2022 and prior to that [facility] 6 years ago.  Patient reports longstanding and disorganized substance use history including multiple detox programs. Patient also reports history of IOP at [facility] in Nov 2022 and prior to that [facility] 6 years ago.

## 2024-01-24 NOTE — DIETITIAN INITIAL EVALUATION ADULT - PERTINENT LABORATORY DATA
01-24    136  |  99  |  10  ----------------------------<  119<H>  3.5   |  27  |  0.74    Ca    8.3<L>      24 Jan 2024 06:41  Phos  2.3     01-24  Mg     1.9     01-24    TPro  6.4  /  Alb  3.5  /  TBili  0.8  /  DBili  x   /  AST  206<H>  /  ALT  217<H>  /  AlkPhos  62  01-24

## 2024-01-24 NOTE — DISCHARGE NOTE NURSING/CASE MANAGEMENT/SOCIAL WORK - NSDCPEFALRISK_GEN_ALL_CORE
For information on Fall & Injury Prevention, visit: [email or web address] OR  [email or web address] OR  https://www.cdc.gov/steadi/patient.html

## 2024-01-24 NOTE — BH CONSULTATION LIAISON ASSESSMENT NOTE - DESCRIPTION
Patient reports that he is single, living alone. Has ex-wife and ex-girlfriend whom both have OOP against him. Has three children; 2 with ex-wife and one with ex-girlfriend. Has limited contact with his children due to court rules.

## 2024-01-24 NOTE — DISCHARGE NOTE PROVIDER - NSDCCAREPROVSEEN_GEN_ALL_CORE_FT
Pedro, Leo Bruce, Brittanie Calderon, Ashlee Spain, Claudia Rascon, Christophe Agustin, Mai Mchugh, Whitley

## 2024-01-24 NOTE — BH CONSULTATION LIAISON ASSESSMENT NOTE - OTHER PAST PSYCHIATRIC HISTORY (INCLUDE DETAILS REGARDING ONSET, COURSE OF ILLNESS, INPATIENT/OUTPATIENT TREATMENT)
Patient reports longstanding psychiatric history of alcohol use disorder, depression, and anxiety. Denies any inpatient psychiatric hospitalizations or current outpatient psychiatric provider.  Patient reports longstanding poly substance use disorder, depression, and anxiety. Denies any inpatient psychiatric hospitalizations or current outpatient psychiatric provider.
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
## 2024-01-24 NOTE — BH CONSULTATION LIAISON ASSESSMENT NOTE - NSBHCHARTREVIEWINVESTIGATE_PSY_A_CORE FT
< from: 12 Lead ECG (01.23.24 @ 22:53) >      Ventricular Rate 123 BPM    Atrial Rate 123 BPM    P-R Interval 158 ms    QRS Duration 94 ms    Q-T Interval 308 ms    QTC Calculation(Bazett) 440 ms    P Axis 73 degrees    R Axis 62 degrees    T Axis 32 degrees    Diagnosis Line Sinus tachycardia  When compared with ECG of 23-JAN-2024 11:29,  No significant change was found  Confirmed by Brett Perdomo ([id number]) on 1/24/2024 4:46:27 PM    < end of copied text >    < from: Xray Chest 1 View- PORTABLE-Urgent (Xray Chest 1 View- PORTABLE-Urgent .) (01.23.24 @ 17:28) >      ACC: [id number] EXAM:  XR CHEST PORTABLE URGENT 1V   ORDERED BY: NATI CANNON     PROCEDURE DATE:  01/23/2024          INTERPRETATION:  AP erect chest on January 23, 2024 5:13 PM. Patient has   alcohol withdrawal vomiting.    COMPARISON: None available.    Heart size is within normal limits. Old left rib fractures again noted.    Lungs are clear. No free intraperitoneal air.    IMPRESSION: No acute finding.    --- End of Report ---            NEAL TILLEY MD; Attending Radiologist  This document has been electronically signed. Jan 23 2024  5:30PM    < end of copied text >

## 2024-01-24 NOTE — BH CONSULTATION LIAISON ASSESSMENT NOTE - NSBHCONSULTFOLLOWAFTERCARE_PSY_A_CORE FT
Will continue to discuss outpatient substance use rehab for patient.  Will continue to encourage sober lifestyle and pursuit not just of abstinence but to engage in recovery.
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
## 2024-01-24 NOTE — BH CONSULTATION LIAISON ASSESSMENT NOTE - NSBHMSEGAIT_PSY_A_CORE
Assumed care of patient at bedside report from DAY RN. Updated on POC. Patient currently A & O x 4; on RA; up ad kaya; without complaints of acute pain. Hemovac to L foot, intact. Call light within reach. Whiteboard updated. Fall precautions in place. Bed locked and in lowest position. All questions answered. No other needs indicated at this time.     Abnormal gait / station

## 2024-01-24 NOTE — BH CONSULTATION LIAISON ASSESSMENT NOTE - NSBHCHARTREVIEWLAB_PSY_A_CORE FT
Complete Blood Count (01.24.24 @ 06:41)   Nucleated RBC: 0 /100 WBCs  WBC Count: 5.22 K/uL  RBC Count: 3.37 M/uL  Hemoglobin: 11.9 g/dL  Hematocrit: 34.4 %  Mean Cell Volume: 102.1 fl  Mean Cell Hemoglobin: 35.3 pg  Mean Cell Hemoglobin Conc: 34.6 gm/dL  Red Cell Distrib Width: 12.7 %  Platelet Count - Automated: 86 K/uL    Basic Metabolic Panel (01.24.24 @ 06:41)   Sodium: 136 mmol/L  Potassium: 3.5 mmol/L  Chloride: 99 mmol/L  Carbon Dioxide: 27 mmol/L  Anion Gap: 10 mmol/L  Blood Urea Nitrogen: 10 mg/dL  Creatinine: 0.70 mg/dL  Glucose: 119 mg/dL  Calcium: 8.2 mg/dL  eGFR: 111

## 2024-01-24 NOTE — DIETITIAN INITIAL EVALUATION ADULT - ORAL INTAKE PTA/DIET HISTORY
Patient endorses poor appetite PTA. Reports that he did not eat x 6 days PTA due to vomiting. Admits to unintentional weight loss over last few weeks. UBW reported to be 165 lb. Admits to consuming 3-4 24oz beers daily. Reports allergy to fish/shellfish (updated in EMR). States that he has thiamine, folic acid, and MVI supplements at home and tries to take them.

## 2024-01-24 NOTE — BH CONSULTATION LIAISON ASSESSMENT NOTE - SUMMARY
Patient is a 53M with PMH of Etoh abuse, chronic L fibular neuropathy after fracture 2023, multiple admissions for alcohol withdrawal recent admission for alcohol withdrawal 12/12/23-12/14/23 left AMA, presents now to [facility] 1/23/24 at noon requesting alcohol detox and complaining of nausea and poor po intake x1 week Patient reports he usually drinks 25oz bottles of beer daily x3. Patient lethargic, poor historian. Obtained permission from patient to speak with his girlfriend Migdalia, who states he usually drinks x6 25oz bottles sometimes poor, but rarely takes hard liquor.In the past he has been in detox program multiple times and relapsed multiple times. For the past week he’s had a decreased appetite and weight loss and today vomited up a sandwich so he checked himself in for detox.  Psychiatry consulted for anxiety, depression, alcohol use.    Patient reports he came to [facility] for detox, however, feels frustrated and anxious with process due to feelings of "confinement to my room." Endorses depression and ongoing alcohol use disorder.    Patient is a 53M with PMH of Etoh abuse, chronic L fibular neuropathy after fracture 2023, multiple admissions for alcohol withdrawal recent admission for alcohol withdrawal 12/12/23-12/14/23 left AMA, presents now to [facility] 1/23/24 at noon requesting alcohol detox and complaining of nausea and poor po intake x1 week Patient reports he usually drinks 25oz bottles of beer daily x3. Patient lethargic, poor historian. Obtained permission from patient to speak with his girlfriend Migdalia, who states he usually drinks x6 25oz bottles sometimes poor, but rarely takes hard liquor. In the past he has been in detox program multiple times and relapsed multiple times. For the past week he’s had a decreased appetite and weight loss and today vomited up a sandwich so he checked himself in for detox.  Psychiatry consulted for anxiety, depression, alcohol use.    Patient reports he came to [facility] for detox, however, feels frustrated and anxious with process due to feelings of "confinement to my room." Endorses depression and ongoing alcohol use disorder.

## 2024-01-24 NOTE — BH CONSULTATION LIAISON ASSESSMENT NOTE - CURRENT MEDICATION
MEDICATIONS  (STANDING):  chlordiazePOXIDE   Oral   chlordiazePOXIDE 50 milliGRAM(s) Oral every 6 hours  enoxaparin Injectable 40 milliGRAM(s) SubCutaneous every 24 hours  folic acid 1 milliGRAM(s) Oral daily  influenza   Vaccine 0.5 milliLiter(s) IntraMuscular once  lactated ringers. 1000 milliLiter(s) (100 mL/Hr) IV Continuous <Continuous>  magnesium oxide 400 milliGRAM(s) Oral three times a day with meals  multivitamin 1 Tablet(s) Oral daily  pantoprazole    Tablet 40 milliGRAM(s) Oral before breakfast  thiamine 100 milliGRAM(s) Oral daily    MEDICATIONS  (PRN):  aluminum hydroxide/magnesium hydroxide/simethicone Suspension 30 milliLiter(s) Oral every 4 hours PRN Dyspepsia  chlordiazePOXIDE 50 milliGRAM(s) Oral every 1 hour PRN CIWA-Ar score 8 or greater  LORazepam   Injectable 2 milliGRAM(s) IV Push every 2 hours PRN CIWA-Ar score increase by 2 points and a total score of 7 or less  melatonin 3 milliGRAM(s) Oral at bedtime PRN Insomnia  ondansetron Injectable 4 milliGRAM(s) IV Push every 8 hours PRN Nausea and/or Vomiting  polyethylene glycol 3350 17 Gram(s) Oral daily PRN Constipation

## 2024-01-24 NOTE — DIETITIAN INITIAL EVALUATION ADULT - PERTINENT MEDS FT
MEDICATIONS  (STANDING):  enoxaparin Injectable 40 milliGRAM(s) SubCutaneous every 24 hours  folic acid 1 milliGRAM(s) Oral daily  influenza   Vaccine 0.5 milliLiter(s) IntraMuscular once  lactated ringers. 1000 milliLiter(s) (100 mL/Hr) IV Continuous <Continuous>  magnesium oxide 400 milliGRAM(s) Oral three times a day with meals  multivitamin 1 Tablet(s) Oral daily  pantoprazole    Tablet 40 milliGRAM(s) Oral before breakfast  potassium phosphate / sodium phosphate Powder (PHOS-NaK) 1 Packet(s) Oral once  thiamine 100 milliGRAM(s) Oral daily    MEDICATIONS  (PRN):  aluminum hydroxide/magnesium hydroxide/simethicone Suspension 30 milliLiter(s) Oral every 4 hours PRN Dyspepsia  melatonin 3 milliGRAM(s) Oral at bedtime PRN Insomnia  ondansetron Injectable 4 milliGRAM(s) IV Push every 8 hours PRN Nausea and/or Vomiting  polyethylene glycol 3350 17 Gram(s) Oral daily PRN Constipation

## 2024-01-24 NOTE — BH CONSULTATION LIAISON ASSESSMENT NOTE - DIFFERENTIAL
- Alcohol use disorder, with withdrawals  - Substance induced mood disorder   Uncomplicated alcohol withdrawal.   -  rule out Substance induced mood disorder with onset during withdrawal.

## 2024-01-24 NOTE — BH CONSULTATION LIAISON ASSESSMENT NOTE - HPI (INCLUDE ILLNESS QUALITY, SEVERITY, DURATION, TIMING, CONTEXT, MODIFYING FACTORS, ASSOCIATED SIGNS AND SYMPTOMS)
Patient is a 53M with PMH of Etoh abuse, chronic L fibular neuropathy after fracture 2023, multiple admissions for alcohol withdrawal recent admission for alcohol withdrawal 12/12/23-12/14/23 left AMA, presents now to [facility] 1/23/24 at noon requesting alcohol detox and complaining of nausea and poor po intake x1 week Patient reports he usually drinks 25oz bottles of beer daily x3. Patient lethargic, poor historian. Obtained permission from patient to speak with his girlfriend Migdalia, who states he usually drinks x6 25oz bottles sometimes poor, but rarely takes hard liquor.In the past he has been in detox program multiple times and relapsed multiple times. For the past week he’s had a decreased appetite and weight loss and today vomited up a sandwich so he checked himself in for detox.  Psychiatry consulted for anxiety, depression, alcohol use.    C/L Psychiatry Note:  Chart reviewed and patient evaluated. Upon evaluation, patient is AAOx3 (disoriented to time), stating he intentionally came to [facility] and ordered an uber so that he could detox. States, "I'm ready to quit alcohol." However, he is frustrated with being "confined to my room." States that he is laying in bed, feels restless, and is unable to walk around the hallways. Discussed with patient that due to the acute phase of his withdrawals, he is a fall risk. Patient states he recently was in [facility] on Friday because he violated an order of protection against his ex-girlfriend. States that he texted her, "Happy Thanksgiving." Patient reports that he has ongoing depression being "in the middle" of having good days and bad days. States his energy and concentration are "okay." Reports not being able to sleep due to withdrawals. Additionally, his appetite is poor, and reports losing 35 lbs in the last 4 months. Reports hopelessness and worthlessness. Additionally, patient reports "lots of anxiety" due to his current withdrawals. Normally has increased anxiety but feels it is controlled. States that when he is very anxious he will experience increased sweating, palpitations, and shortness of breath. Denies any panic attacks. Patient characterizes his drinking as a daily pattern; waking up at 8am and drinking 3 24oz bottles of "naddy daddy's" every 6 hours. Denies drinking hard liquor. Patient denies any current delusions, paranoia, or hallucinations. Denies any suicidal/homicidal ideation, intent, or plan.

## 2024-01-24 NOTE — DISCHARGE NOTE NURSING/CASE MANAGEMENT/SOCIAL WORK - PATIENT PORTAL LINK FT
You can access the FollowMyHealth Patient Portal offered by [facility] by registering at the following website: [email or web address]. By joining [facility]’s FollowMyHealth portal, you will also be able to view your health information using other applications (apps) compatible with our system.

## 2024-01-24 NOTE — DISCHARGE NOTE PROVIDER - NSDCCPCAREPLAN_GEN_ALL_CORE_FT
PRINCIPAL DISCHARGE DIAGNOSIS  Diagnosis: Alcohol dependence with withdrawal  Assessment and Plan of Treatment: Alcohol withdrawal occurs when you stop drinking, or you drink less while having alcohol dependence. Symptoms begin as your body tries to get used to this change. Common symptoms include shaking, throwing up, and sweating. These symptoms may make you anxious and unable to work or be around others. Symptoms occur within several hours to a few days after stopping alcohol.  You were treated for alcohol withdrawal in the hospital with medications.   You were offered inpatient or outpatient medical treatment.   At this point you are ready for discharge from the hospital, but your care does not end there. You should continue to work with your outpatient doctors to find a method of quitting that works for you.   Avoid drinking alcohol: Set a goal for yourself to completely stop drinking. This will stop you from being dependent on it. This will also prevent you from developing alcohol withdrawal and other more serious health problems. Ask your caregiver if you should more of other liquids, such as water. Avoid caffeine, which may be found in coffee, tea, and sports drinks.  CONTACT A CAREGIVER IF:  You cannot make it to your next meeting with your caregiver.  You feel you cannot cope at home, work, or in school.  You have new symptoms since the last time you visited your caregiver.  Your symptoms are getting worse.  You have questions or concerns about your alcohol dependence or withdrawal, medicine, or care.  SEEK CARE IMMEDIATELY IF:  You just had a convulsion.  You have trouble breathing, chest pains, or a fast heartbeat.  You feel like hurting yourself or someone else.      SECONDARY DISCHARGE DIAGNOSES  Diagnosis: History of seizure due to alcohol withdrawal  Assessment and Plan of Treatment:

## 2024-01-24 NOTE — DIETITIAN INITIAL EVALUATION ADULT - ENERGY INTAKE
Fair (50-75%) Patient reports consuming 50% of breakfast this AM. Patient denies any difficulty chewing/swallowing. Patient requesting to receive ensure supplements. Will provide Ensure Plus High Protein 8oz PO BID (Provides 700kcal & 40grams of Protein) to enhance PO intakes and optimize nutritional status.

## 2024-01-24 NOTE — BH CONSULTATION LIAISON ASSESSMENT NOTE - NSBHCONSULTRECOMMENDOTHER_PSY_A_CORE FT
- Lengthy discussion with patient about goals/plans of care. Patient encouraged to NOT sign out AMA.   - Patient interested in IOP at [facility] after completing detox.    - Lengthy discussion with patient about goals/plans of care. Patient encouraged to NOT sign out AMA.   - Patient interested in IOP at [facility] after completing detox.

## 2024-01-24 NOTE — DISCHARGE NOTE PROVIDER - DATE OF DISCHARGE SERVICE:
Topical Retinoid Pregnancy And Lactation Text: This medication is Pregnancy Category C. It is unknown if this medication is excreted in breast milk. Use Enhanced Medication Counseling?: No Minocycline Counseling: Patient advised regarding possible photosensitivity and discoloration of the teeth, skin, lips, tongue and gums.  Patient instructed to avoid sunlight, if possible.  When exposed to sunlight, patients should wear protective clothing, sunglasses, and sunscreen.  The patient was instructed to call the office immediately if the following severe adverse effects occur:  hearing changes, easy bruising/bleeding, severe headache, or vision changes.  The patient verbalized understanding of the proper use and possible adverse effects of minocycline.  All of the patient's questions and concerns were addressed. Benzoyl Peroxide Pregnancy And Lactation Text: This medication is Pregnancy Category C. It is unknown if benzoyl peroxide is excreted in breast milk. Topical Sulfur Applications Counseling: Topical Sulfur Counseling: Patient counseled that this medication may cause skin irritation or allergic reactions.  In the event of skin irritation, the patient was advised to reduce the amount of the drug applied or use it less frequently.   The patient verbalized understanding of the proper use and possible adverse effects of topical sulfur application.  All of the patient's questions and concerns were addressed. Doxycycline Counseling:  Patient counseled regarding possible photosensitivity and increased risk for sunburn.  Patient instructed to avoid sunlight, if possible.  When exposed to sunlight, patients should wear protective clothing, sunglasses, and sunscreen.  The patient was instructed to call the office immediately if the following severe adverse effects occur:  hearing changes, easy bruising/bleeding, severe headache, or vision changes.  The patient verbalized understanding of the proper use and possible adverse effects of doxycycline.  All of the patient's questions and concerns were addressed. Spironolactone Pregnancy And Lactation Text: This medication can cause feminization of the male fetus and should be avoided during pregnancy. The active metabolite is also found in breast milk. Topical Sulfur Applications Pregnancy And Lactation Text: This medication is Pregnancy Category C and has an unknown safety profile during pregnancy. It is unknown if this topical medication is excreted in breast milk. Birth Control Pills Pregnancy And Lactation Text: This medication should be avoided if pregnant and for the first 30 days post-partum. High Dose Vitamin A Counseling: Side effects reviewed, pt to contact office should one occur. Topical Retinoid counseling:  Patient advised to apply a pea-sized amount only at bedtime and wait 30 minutes after washing their face before applying.  If too drying, patient may add a non-comedogenic moisturizer. The patient verbalized understanding of the proper use and possible adverse effects of retinoids.  All of the patient's questions and concerns were addressed. Birth Control Pills Counseling: Birth Control Pill Counseling: I discussed with the patient the potential side effects of OCPs including but not limited to increased risk of stroke, heart attack, thrombophlebitis, deep venous thrombosis, hepatic adenomas, breast changes, GI upset, headaches, and depression.  The patient verbalized understanding of the proper use and possible adverse effects of OCPs. All of the patient's questions and concerns were addressed. Tetracycline Pregnancy And Lactation Text: This medication is Pregnancy Category D and not consider safe during pregnancy. It is also excreted in breast milk. Tazorac Pregnancy And Lactation Text: This medication is not safe during pregnancy. It is unknown if this medication is excreted in breast milk. Isotretinoin Pregnancy And Lactation Text: This medication is Pregnancy Category X and is considered extremely dangerous during pregnancy. It is unknown if it is excreted in breast milk. High Dose Vitamin A Pregnancy And Lactation Text: High dose vitamin A therapy is contraindicated during pregnancy and breast feeding. Azithromycin Pregnancy And Lactation Text: This medication is considered safe during pregnancy and is also secreted in breast milk. Bactrim Pregnancy And Lactation Text: This medication is Pregnancy Category D and is known to cause fetal risk.  It is also excreted in breast milk. Erythromycin Counseling:  I discussed with the patient the risks of erythromycin including but not limited to GI upset, allergic reaction, drug rash, diarrhea, increase in liver enzymes, and yeast infections. Dapsone Pregnancy And Lactation Text: This medication is Pregnancy Category C and is not considered safe during pregnancy or breast feeding. Topical Clindamycin Pregnancy And Lactation Text: This medication is Pregnancy Category B and is considered safe during pregnancy. It is unknown if it is excreted in breast milk. Doxycycline Pregnancy And Lactation Text: This medication is Pregnancy Category D and not consider safe during pregnancy. It is also excreted in breast milk but is considered safe for shorter treatment courses. Erythromycin Pregnancy And Lactation Text: This medication is Pregnancy Category B and is considered safe during pregnancy. It is also excreted in breast milk. Isotretinoin Counseling: Patient should get monthly blood tests, not donate blood, not drive at night if vision affected, not share medication, and not undergo elective surgery for 6 months after tx completed. Side effects reviewed, pt to contact office should one occur. 24-Jan-2024 Tetracycline Counseling: Patient counseled regarding possible photosensitivity and increased risk for sunburn.  Patient instructed to avoid sunlight, if possible.  When exposed to sunlight, patients should wear protective clothing, sunglasses, and sunscreen.  The patient was instructed to call the office immediately if the following severe adverse effects occur:  hearing changes, easy bruising/bleeding, severe headache, or vision changes.  The patient verbalized understanding of the proper use and possible adverse effects of tetracycline.  All of the patient's questions and concerns were addressed. Patient understands to avoid pregnancy while on therapy due to potential birth defects. Spironolactone Counseling: Patient advised regarding risks of diarrhea, abdominal pain, hyperkalemia, birth defects (for female patients), liver toxicity and renal toxicity. The patient may need blood work to monitor liver and kidney function and potassium levels while on therapy. The patient verbalized understanding of the proper use and possible adverse effects of spironolactone.  All of the patient's questions and concerns were addressed. Detail Level: Zone Azithromycin Counseling:  I discussed with the patient the risks of azithromycin including but not limited to GI upset, allergic reaction, drug rash, diarrhea, and yeast infections. Tazorac Counseling:  Patient advised that medication is irritating and drying.  Patient may need to apply sparingly and wash off after an hour before eventually leaving it on overnight.  The patient verbalized understanding of the proper use and possible adverse effects of tazorac.  All of the patient's questions and concerns were addressed. Topical Clindamycin Counseling: Patient counseled that this medication may cause skin irritation or allergic reactions.  In the event of skin irritation, the patient was advised to reduce the amount of the drug applied or use it less frequently.   The patient verbalized understanding of the proper use and possible adverse effects of clindamycin.  All of the patient's questions and concerns were addressed. Bactrim Counseling:  I discussed with the patient the risks of sulfa antibiotics including but not limited to GI upset, allergic reaction, drug rash, diarrhea, dizziness, photosensitivity, and yeast infections.  Rarely, more serious reactions can occur including but not limited to aplastic anemia, agranulocytosis, methemoglobinemia, blood dyscrasias, liver or kidney failure, lung infiltrates or desquamative/blistering drug rashes. Dapsone Counseling: I discussed with the patient the risks of dapsone including but not limited to hemolytic anemia, agranulocytosis, rashes, methemoglobinemia, kidney failure, peripheral neuropathy, headaches, GI upset, and liver toxicity.  Patients who start dapsone require monitoring including baseline LFTs and weekly CBCs for the first month, then every month thereafter.  The patient verbalized understanding of the proper use and possible adverse effects of dapsone.  All of the patient's questions and concerns were addressed. Benzoyl Peroxide Counseling: Patient counseled that medicine may cause skin irritation and bleach clothing.  In the event of skin irritation, the patient was advised to reduce the amount of the drug applied or use it less frequently.   The patient verbalized understanding of the proper use and possible adverse effects of benzoyl peroxide.  All of the patient's questions and concerns were addressed.

## 2024-01-24 NOTE — DISCHARGE NOTE PROVIDER - DETAILS OF MALNUTRITION DIAGNOSIS/DIAGNOSES
This patient has been assessed with a concern for Malnutrition and was treated during this hospitalization for the following Nutrition diagnosis/diagnoses:     -  01/24/2024: Severe protein-calorie malnutrition
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
## 2024-01-24 NOTE — BH CONSULTATION LIAISON ASSESSMENT NOTE - NSBHCHARTREVIEWVS_PSY_A_CORE FT
Vital Signs Last 24 Hrs  T(C): 36.8 (24 Jan 2024 14:38), Max: 37.4 (23 Jan 2024 20:09)  T(F): 98.2 (24 Jan 2024 14:38), Max: 99.3 (23 Jan 2024 20:09)  HR: 97 (24 Jan 2024 14:38) (97 - 114)  BP: 139/98 (24 Jan 2024 14:38) (120/74 - 139/98)  BP(mean): --  RR: 18 (24 Jan 2024 14:38) (16 - 18)  SpO2: 94% (24 Jan 2024 14:38) (94% - 98%)    Parameters below as of 24 Jan 2024 14:38  Patient On (Oxygen Delivery Method): room air

## 2024-01-24 NOTE — PROGRESS NOTE ADULT - ATTENDING COMMENTS
Agree with resident plan above, please see note for full details of the service      PE: A+Ox3, no FND, lungs CTA b/l, no murmurs, abd NT/ND, no lower extremity swelling.      Assessment:   - ETOH intoxication/withdrawal, transaminitis  - Thrombocytopenia   - Hyperglycemia   - Hx of ETOH abuse, left fibular neuropathy      Plan:   - Was on symptom triggered -> added tapered Ativan PO -> pt still scoring high with CIWA 9 -> changed to Librium taper and c/w PRN Ativan   - Social work/case management consult for options for possible alcohol rehab if interested    - D/C planning, ancipitate 48 hours Agree with resident plan above, please see note for full details of the service      PE: A+Ox3, no FND, lungs CTA b/l, no murmurs, abd NT/ND, no lower extremity swelling.      Assessment:   - ETOH intoxication/withdrawal, transaminitis  - Thrombocytopenia   - Hyperglycemia   - Hx of ETOH abuse, left fibular neuropathy      Plan:   - Was on symptom triggered -> added tapered Ativan PO -> pt still scoring high with CIWA 9 -> changed to Librium taper and c/w PRN Ativan   - Social work/case management consult for options for possible alcohol rehab if interested    - D/C planning, ancipitate 48 hours    I spent a total of 45 minutes on the date of this encounter coordinating the patient's care. This includes reviewing results/imaging and discussions with specialists, nursing, case management/social work. Further tests, medications, and procedures have been ordered as indicated. Results and the plan of care were communicated to the patient and/or their family member. Supporting documentation was completed and added to the patient's chart.

## 2024-01-24 NOTE — DISCHARGE NOTE PROVIDER - CARE PROVIDER_API CALL
Rose Rojas  Neurology  [address], [address]  [place] [address]  Phone: [phone]  Fax: [phone]  Established Patient  Follow Up Time: 2 weeks

## 2024-01-24 NOTE — DIETITIAN INITIAL EVALUATION ADULT - ADD RECOMMEND
1. Add Ensure Plus High Protein 8oz PO BID (Provides 700kcal & 40grams of Protein) - Chocolate flavor per patients request  2. Continue with thiamine, folic acid, and MVI  3. [place] patients daily food preferences as feasible with prescribed diet   4. Monitor daily PO intakes, GI tolerance, labs, weights, skin integrity, & BM regularity

## 2024-01-24 NOTE — DIETITIAN INITIAL EVALUATION ADULT - SIGNS/SYMPTOMS
9.69% unintentional weight loss <1 month, <50% EER >5days, & muscle and subcutaneous fat loss as evidenced by patients intake report of 3-4 24 oz. beers per day

## 2024-01-24 NOTE — BH CONSULTATION LIAISON ASSESSMENT NOTE - RISK ASSESSMENT
While patient is currently withdrawing with depressing symptoms; he denies any feelings of suicidal ideation and has no history of suicidality. Low risk for all dangerous behavior at this time.  While patient is currently withdrawing with depressing symptoms; he denies any feelings of suicidal ideation and has no history of suicidality. Pt with no previous psychiatric history in terms of inpatient care, presented voluntarily for detox, however pt has a long history of poor interpersonal relationships and is s/p incarceration for violation of OOP, pt denies at this time homicidal or self harm ideation.

## 2024-01-24 NOTE — DIETITIAN NUTRITION RISK NOTIFICATION - TREATMENT: THE FOLLOWING DIET HAS BEEN RECOMMENDED
Diet, Regular:   Supplement Feeding Modality:  Oral  Ensure Plus High Protein Cans or Servings Per Day:  1       Frequency:  Two Times a day (01-24-24 @ 13:00) [Pending Verification By Attending]  Diet, Regular (01-23-24 @ 12:43) [Active]

## 2024-01-24 NOTE — BH CONSULTATION LIAISON ASSESSMENT NOTE - ATTENDING COMMENTS
Pt signed out AMA despite lengthy conversation regarding risks, benefits and alternatives including no treatment.   Pt left before seen by the undersigned, case discussed with psychiatric NP Christophe Rascon.

## 2024-01-24 NOTE — DIETITIAN INITIAL EVALUATION ADULT - OTHER INFO
53M with PMH of Etoh abuse, chronic L fibular neuropathy after fracture 2023, multiple admissions for alcohol withdrawal recent admission for alcohol withdrawal 12/12/23-12/14/23 left AMA, presents now to [facility] 1/23/24 at noon requesting alcohol detox and complaining of nausea and poor po intake x1 week Patient reports he usually drinks 25oz bottles of beer daily x3. Patient lethargic, poor historian. Obtained permission from patient to speak with his girlfriend Migdalia, who states he usually drinks x6 25oz bottles sometimes poor, but rarely takes hard liquor.In the past he has been in detox program multiple times and relapsed multiple times. For the past week he’s had a decreased appetite and weight loss and today vomited up a sandwich so he checked himself in for detox. At the time of speaking to him today he denies any pain or discomfort. He tolerated a sandwich well without nausea/vomiting.    Thiamine, folic acid, & MVI ordered 2/2 Hx ETOH abuse. Electrolytes stable at this time. Encouraged PO intakes. Food preferences obtained and noted on patients file with nutrition office. NFPE consistent with malnutrition as evidenced by 9.69% unintentional weight loss <1 month, <50% EER >5 days, and muscle depletion and subcutaneous fat loss.

## 2024-02-26 ENCOUNTER — INPATIENT (INPATIENT)
Facility: HOSPITAL | Age: 54
LOS: 0 days | Discharge: LEFT BEFORE DISCHARGE | DRG: 894 | End: 2024-02-27
Attending: FAMILY MEDICINE | Admitting: INTERNAL MEDICINE
Payer: MEDICAID

## 2024-02-26 VITALS
RESPIRATION RATE: 18 BRPM | OXYGEN SATURATION: 95 % | SYSTOLIC BLOOD PRESSURE: 125 MMHG | TEMPERATURE: 98 F | HEIGHT: 71 IN | WEIGHT: 90.39 LBS | DIASTOLIC BLOOD PRESSURE: 87 MMHG | HEART RATE: 130 BPM

## 2024-02-26 DIAGNOSIS — F10.239 ALCOHOL DEPENDENCE WITH WITHDRAWAL, UNSPECIFIED: ICD-10-CM

## 2024-02-26 PROBLEM — F10.10 ALCOHOL ABUSE, UNCOMPLICATED: Chronic | Status: ACTIVE | Noted: 2024-01-23

## 2024-02-26 LAB
ALBUMIN SERPL ELPH-MCNC: 3.8 G/DL — SIGNIFICANT CHANGE UP (ref 3.3–5)
ALP SERPL-CCNC: 59 U/L — SIGNIFICANT CHANGE UP (ref 40–120)
ALT FLD-CCNC: 110 U/L — HIGH (ref 10–45)
ANION GAP SERPL CALC-SCNC: 15 MMOL/L — SIGNIFICANT CHANGE UP (ref 5–17)
AST SERPL-CCNC: 144 U/L — HIGH (ref 10–40)
BASOPHILS # BLD AUTO: 0.05 K/UL — SIGNIFICANT CHANGE UP (ref 0–0.2)
BASOPHILS NFR BLD AUTO: 1.3 % — SIGNIFICANT CHANGE UP (ref 0–2)
BILIRUB SERPL-MCNC: 0.5 MG/DL — SIGNIFICANT CHANGE UP (ref 0.2–1.2)
BUN SERPL-MCNC: 8 MG/DL — SIGNIFICANT CHANGE UP (ref 7–23)
CALCIUM SERPL-MCNC: 8.4 MG/DL — SIGNIFICANT CHANGE UP (ref 8.4–10.5)
CHLORIDE SERPL-SCNC: 101 MMOL/L — SIGNIFICANT CHANGE UP (ref 96–108)
CO2 SERPL-SCNC: 24 MMOL/L — SIGNIFICANT CHANGE UP (ref 22–31)
CREAT SERPL-MCNC: 0.67 MG/DL — SIGNIFICANT CHANGE UP (ref 0.5–1.3)
EGFR: 112 ML/MIN/1.73M2 — SIGNIFICANT CHANGE UP
EOSINOPHIL # BLD AUTO: 0.06 K/UL — SIGNIFICANT CHANGE UP (ref 0–0.5)
EOSINOPHIL NFR BLD AUTO: 1.6 % — SIGNIFICANT CHANGE UP (ref 0–6)
ETHANOL SERPL-MCNC: 308 MG/DL — HIGH (ref 0–3)
GLUCOSE SERPL-MCNC: 121 MG/DL — HIGH (ref 70–99)
HCT VFR BLD CALC: 39.3 % — SIGNIFICANT CHANGE UP (ref 39–50)
HGB BLD-MCNC: 14.1 G/DL — SIGNIFICANT CHANGE UP (ref 13–17)
IMM GRANULOCYTES NFR BLD AUTO: 0.5 % — SIGNIFICANT CHANGE UP (ref 0–0.9)
LIDOCAIN IGE QN: 72 U/L — SIGNIFICANT CHANGE UP (ref 16–77)
LYMPHOCYTES # BLD AUTO: 0.96 K/UL — LOW (ref 1–3.3)
LYMPHOCYTES # BLD AUTO: 25.7 % — SIGNIFICANT CHANGE UP (ref 13–44)
MAGNESIUM SERPL-MCNC: 1.6 MG/DL — SIGNIFICANT CHANGE UP (ref 1.6–2.6)
MCHC RBC-ENTMCNC: 35.8 PG — HIGH (ref 27–34)
MCHC RBC-ENTMCNC: 35.9 GM/DL — SIGNIFICANT CHANGE UP (ref 32–36)
MCV RBC AUTO: 99.7 FL — SIGNIFICANT CHANGE UP (ref 80–100)
MONOCYTES # BLD AUTO: 0.28 K/UL — SIGNIFICANT CHANGE UP (ref 0–0.9)
MONOCYTES NFR BLD AUTO: 7.5 % — SIGNIFICANT CHANGE UP (ref 2–14)
NEUTROPHILS # BLD AUTO: 2.37 K/UL — SIGNIFICANT CHANGE UP (ref 1.8–7.4)
NEUTROPHILS NFR BLD AUTO: 63.4 % — SIGNIFICANT CHANGE UP (ref 43–77)
NRBC # BLD: 0 /100 WBCS — SIGNIFICANT CHANGE UP (ref 0–0)
PHOSPHATE SERPL-MCNC: 3.6 MG/DL — SIGNIFICANT CHANGE UP (ref 2.5–4.5)
PLATELET # BLD AUTO: 101 K/UL — LOW (ref 150–400)
POTASSIUM SERPL-MCNC: 4.2 MMOL/L — SIGNIFICANT CHANGE UP (ref 3.5–5.3)
POTASSIUM SERPL-SCNC: 4.2 MMOL/L — SIGNIFICANT CHANGE UP (ref 3.5–5.3)
PROT SERPL-MCNC: 6.9 G/DL — SIGNIFICANT CHANGE UP (ref 6–8.3)
RBC # BLD: 3.94 M/UL — LOW (ref 4.2–5.8)
RBC # FLD: 12.6 % — SIGNIFICANT CHANGE UP (ref 10.3–14.5)
SODIUM SERPL-SCNC: 140 MMOL/L — SIGNIFICANT CHANGE UP (ref 135–145)
WBC # BLD: 3.74 K/UL — LOW (ref 3.8–10.5)
WBC # FLD AUTO: 3.74 K/UL — LOW (ref 3.8–10.5)

## 2024-02-26 PROCEDURE — 99285 EMERGENCY DEPT VISIT HI MDM: CPT

## 2024-02-26 PROCEDURE — 99223 1ST HOSP IP/OBS HIGH 75: CPT

## 2024-02-26 RX ORDER — LANOLIN ALCOHOL/MO/W.PET/CERES
3 CREAM (GRAM) TOPICAL AT BEDTIME
Refills: 0 | Status: DISCONTINUED | OUTPATIENT
Start: 2024-02-26 | End: 2024-02-27

## 2024-02-26 RX ORDER — THIAMINE MONONITRATE (VIT B1) 100 MG
100 TABLET ORAL DAILY
Refills: 0 | Status: DISCONTINUED | OUTPATIENT
Start: 2024-02-26 | End: 2024-02-27

## 2024-02-26 RX ORDER — ONDANSETRON 8 MG/1
4 TABLET, FILM COATED ORAL EVERY 8 HOURS
Refills: 0 | Status: DISCONTINUED | OUTPATIENT
Start: 2024-02-26 | End: 2024-02-27

## 2024-02-26 RX ORDER — ONDANSETRON 8 MG/1
4 TABLET, FILM COATED ORAL ONCE
Refills: 0 | Status: COMPLETED | OUTPATIENT
Start: 2024-02-26 | End: 2024-02-26

## 2024-02-26 RX ORDER — HEPARIN SODIUM 5000 [USP'U]/ML
5000 INJECTION INTRAVENOUS; SUBCUTANEOUS EVERY 12 HOURS
Refills: 0 | Status: DISCONTINUED | OUTPATIENT
Start: 2024-02-26 | End: 2024-02-27

## 2024-02-26 RX ORDER — SODIUM CHLORIDE 9 MG/ML
1000 INJECTION INTRAMUSCULAR; INTRAVENOUS; SUBCUTANEOUS ONCE
Refills: 0 | Status: COMPLETED | OUTPATIENT
Start: 2024-02-26 | End: 2024-02-26

## 2024-02-26 RX ORDER — FOLIC ACID 0.8 MG
1 TABLET ORAL DAILY
Refills: 0 | Status: DISCONTINUED | OUTPATIENT
Start: 2024-02-26 | End: 2024-02-27

## 2024-02-26 RX ORDER — INFLUENZA VIRUS VACCINE 15; 15; 15; 15 UG/.5ML; UG/.5ML; UG/.5ML; UG/.5ML
0.5 SUSPENSION INTRAMUSCULAR ONCE
Refills: 0 | Status: DISCONTINUED | OUTPATIENT
Start: 2024-02-26 | End: 2024-02-27

## 2024-02-26 RX ORDER — SODIUM CHLORIDE 9 MG/ML
1000 INJECTION INTRAMUSCULAR; INTRAVENOUS; SUBCUTANEOUS
Refills: 0 | Status: DISCONTINUED | OUTPATIENT
Start: 2024-02-26 | End: 2024-02-27

## 2024-02-26 RX ADMIN — SODIUM CHLORIDE 1000 MILLILITER(S): 9 INJECTION INTRAMUSCULAR; INTRAVENOUS; SUBCUTANEOUS at 14:10

## 2024-02-26 RX ADMIN — Medication 2 MILLIGRAM(S): at 17:52

## 2024-02-26 RX ADMIN — Medication 3 MILLIGRAM(S): at 23:00

## 2024-02-26 RX ADMIN — Medication 1 MILLIGRAM(S): at 23:00

## 2024-02-26 RX ADMIN — SODIUM CHLORIDE 1000 MILLILITER(S): 9 INJECTION INTRAMUSCULAR; INTRAVENOUS; SUBCUTANEOUS at 13:10

## 2024-02-26 RX ADMIN — ONDANSETRON 4 MILLIGRAM(S): 8 TABLET, FILM COATED ORAL at 13:10

## 2024-02-26 RX ADMIN — Medication 25 MILLIGRAM(S): at 13:10

## 2024-02-26 RX ADMIN — Medication 1 MILLIGRAM(S): at 19:38

## 2024-02-26 RX ADMIN — Medication 2 MILLIGRAM(S): at 21:16

## 2024-02-26 RX ADMIN — Medication 100 MILLIGRAM(S): at 16:30

## 2024-02-26 RX ADMIN — Medication 1 MILLIGRAM(S): at 13:09

## 2024-02-26 RX ADMIN — SODIUM CHLORIDE 80 MILLILITER(S): 9 INJECTION INTRAMUSCULAR; INTRAVENOUS; SUBCUTANEOUS at 19:38

## 2024-02-26 NOTE — ED PROVIDER NOTE - OBJECTIVE STATEMENT
53-year-old male past medical history of alcohol abuse as well as polysubstance abuse presenting with concerns of alcohol withdrawal.  Patient states he has been drinking approximately 60 ounces of beer daily and wants to get his life back in order.  He stopped drinking yesterday and currently feels the "truck of withdrawal" coming to hit him.  Currently feels slightly tremulous and somewhat anxious.  He is requesting to come into the hospital for withdrawal detox.  I offered the patient the possibility of inpatient alcohol rehab placement and he declined.

## 2024-02-26 NOTE — ED ADULT TRIAGE NOTE - CHIEF COMPLAINT QUOTE
Patient presents to ED from home. States "I would like to detox from alcohol." Patient states he was here about a month ago and admitted for detox but AMA'd on day 3.

## 2024-02-26 NOTE — ED PROVIDER NOTE - CLINICAL SUMMARY MEDICAL DECISION MAKING FREE TEXT BOX
53-year-old male past with history alcohol abuse presenting for withdrawal and request to detox.  The patient is currently hemodynamically stable.  He has a mildly elevated CIWA so we will give the patient benzodiazepine both short acting and long-acting.  I did discuss the possibly of outpatient rehab but the patient is declining at this time.  He does agree to come stay in the hospital as time the last time he was here for withdrawal he did sign out AGAINST MEDICAL ADVICE.  Discussed with the hospitalist to admit to her service

## 2024-02-26 NOTE — H&P ADULT - ATTENDING COMMENTS
Pt seen and examined at bedside.  No acute events overnight  Pt denies cp, palpitations, sob, abd pain, N/V, fever, chills    Pt w/ hx of EtOH abuse, recently admitted and discharged for withdrawal management. States restarted drinking after discharge.  Last drink early this AM.   Has the willingness to stop drinking. Has tried Vivitrol, Naltrexone and acamprosate with no help  Vitals reviewed  Blood work reviewed  Physical exam unremarkable other than mild b/l extremity tremors as well as sinus tachycardia    A/P  EtOH withdrawal  Start Ativan taper  SW consult  Psych consult  Monitor vitals

## 2024-02-26 NOTE — H&P ADULT - ASSESSMENT
53M with PMH of Etoh abuse, chronic L fibular neuropathy after fracture 2023, multiple admissions for alcohol withdrawal recent admission for alcohol withdrawal 23/1/24 , presents now   requesting alcohol detox.    #Alcohol Abuse and  withdrawal  -Admit to Med/ Surg   -Usually drinks x3 25oz bottles of beer daily- Last drink was this am   -Alcohol level 308  -Denies h/o intubations  -Fall & seizure precautions   -Multivitamin, thiamine 100mg, folic acid 1mg   - S/P one dose of ativan  and librium   -Will start on ativan bryan  -Zofran 4mg IV Q8H PRN nausea and/or vomiting  -F/u AM CMP, Mg, Phos  - consult    #Transaminitis  -Mild transaminitis observed from prior admissions  - Probably due to chronic alcohol use     DVT ppx: Heparin bid     Code: Patient is FULL CODE at this time, consents to chest compressions and intubation if deemed medically necessary     d/w Dr. Chua

## 2024-02-26 NOTE — ED ADULT TRIAGE NOTE - TEMPERATURE IN FAHRENHEIT (DEGREES F)
63F female w/ PMHx CAD s/p stents, CVA, COPD, PAD w/ prev LLE poss RLE stenting (Dr. Hart,  [facility]), 20 pack/year smoking history, EtOH use, and HTN presents with acute RLE ischemia. Patient s/p RLE angiogram with lysis catheter placement on 7/18 and RTOR on 7/19 for removal of lysis catheter, right lower extremity angiogram with percutaneous thrombectomy of the superficial femoral and popliteal arteries. Now with AT/PT signals of RLE.    - cont hep gtt  - wean levo (held since AM)  - pain control prn  - dc ha  - pt consult  - vein mapping/cards consult for possible bypass planning.    Vascular Surgery  [id number] 63F female w/ PMHx CAD s/p stents, CVA, COPD, PAD w/ prev LLE poss RLE stenting (Dr. Hart,  [facility]), 20 pack/year smoking history, EtOH use, and HTN presents with acute RLE ischemia. Patient s/p RLE angiogram with lysis catheter placement on 7/18 and RTOR on 7/19 for removal of lysis catheter, right lower extremity angiogram with percutaneous thrombectomy of the superficial femoral and popliteal arteries. Now with AT/PT signals of RLE.    - cont hep gtt  - wean levo (held since AM)  - pain control prn  - dc ha  - pt consult  - vein mapping/cards consult for possible bypass planning.    Vascular Surgery  [id number] 63F female w/ PMHx CAD s/p stents, CVA, COPD, PAD w/ prev LLE poss RLE stenting (Dr. Hart,  [facility]), 20 pack/year smoking history, EtOH use, and HTN presents with acute RLE ischemia. Patient s/p RLE angiogram with lysis catheter placement on 7/18 and RTOR on 7/19 for removal of lysis catheter, right lower extremity angiogram with percutaneous thrombectomy of the superficial femoral and popliteal arteries. Now with AT/PT signals of RLE.    - cont hep gtt  - wean levo (held since AM)  - pain control prn  - dc ha  - pt consult  - vein mapping/cards consult for possible bypass planning.    Vascular Surgery  [id number]

## 2024-02-26 NOTE — PATIENT PROFILE ADULT - FALL HARM RISK - HARM RISK INTERVENTIONS

## 2024-02-26 NOTE — ED PROVIDER NOTE - PHYSICAL EXAMINATION
Vitals: I have reviewed the patients vital signs  General: nontoxic appearing  HEENT: Atraumatic, normocephalic, airway patent  Eyes: EOMI, tracking appropriately  Neck: no tracheal deviation  Chest/Lungs: no trauma, symmetric chest rise, speaking in complete sentences,  no resp distress  Heart: skin and extremities well perfused, tachycardic rate and rhythm  Neuro: A+Ox3, appears non focal fine tremors  MSK: no deformities  Skin: no cyanosis, no jaundice   Psych:  Normal mood and affect

## 2024-02-26 NOTE — H&P ADULT - NSHPPHYSICALEXAM_GEN_ALL_CORE
Vital Signs Last 24 Hrs  T(C): 36.7 (26 Feb 2024 11:38), Max: 36.7 (26 Feb 2024 11:38)  T(F): 98 (26 Feb 2024 11:38), Max: 98 (26 Feb 2024 11:38)  HR: 130 (26 Feb 2024 11:38) (130 - 130)  BP: 125/87 (26 Feb 2024 11:38) (125/87 - 125/87)  BP(mean): --  RR: 18 (26 Feb 2024 11:38) (18 - 18)  SpO2: 95% (26 Feb 2024 11:38) (95% - 95%)    Parameters below as of 26 Feb 2024 11:38  Patient On (Oxygen Delivery Method): room air      VITALS:     GENERAL: NAD, lying in bed comfortably  HEAD:  Atraumatic, Normocephalic  EYES: EOMI, PERRLA, conjunctiva and sclera clear  ENT: Moist mucous membranes  NECK: Supple, No JVD  CHEST/LUNG: Clear to auscultation bilaterally; No rales, rhonchi, wheezing, or rubs. Unlabored respirations  HEART: Regular rate and rhythm; No murmurs, rubs, or gallops  ABDOMEN: Bowel sounds present; Soft, Nontender, Nondistended. No hepatomegaly  EXTREMITIES:  2+ Peripheral Pulses, brisk capillary refill. No clubbing, cyanosis, or edema  NERVOUS SYSTEM:  Alert & Oriented X3, speech clear. No deficits   MSK: FROM all 4 extremities, full and equal strength  SKIN: No rashes or lesions. Mild ecchymosis noted on the right foot dorsal surface.

## 2024-02-26 NOTE — ED ADULT NURSE NOTE - NSFALLRISKINTERV_ED_ALL_ED
Assistance OOB with selected safe patient handling equipment if applicable/Assistance with ambulation/Communicate fall risk and risk factors to all staff, patient, and family/Monitor gait and stability/Monitor for mental status changes and reorient to person, place, and time, as needed/Provide visual cue: yellow wristband, yellow gown, etc/Reinforce activity limits and safety measures with patient and family/Toileting schedule using arm’s reach rule for commode and bathroom/Use of alarms - bed, stretcher, chair and/or video monitoring/Call bell, personal items and telephone in reach/Instruct patient to call for assistance before getting out of bed/chair/stretcher/Non-slip footwear applied when patient is off stretcher/[place] to call system/Physically safe environment - no spills, clutter or unnecessary equipment/Purposeful Proactive Rounding/Room/bathroom lighting operational, light cord in reach

## 2024-02-26 NOTE — ED ADULT NURSE NOTE - NS ED NURSE LEVEL OF CONSCIOUSNESS MENTAL STATUS
2020      To Whom It May Concern:    Margarita Smith (: 1991) is my patient, and has been under my care since 2018. I am intimately familiar with her history and functional limitations imposed by her disability. Due to her mental health concerns, Margarita has certain limitations regarding mood/emotional instability that result in social and academic/occupational functioning impairments.     In order to help cope with this disability, to enhance her ability to live independently, and to fully use and enjoy the dwelling unit you own and/or administer, I have prescribed Margarita obtain two emotional support felines. The presence of these emotional support  felines are necessary for the mental and/or physical health of Margarita because their presence will provide benefits including stress management promotion to improve mood/emotional stability.    I am aware that according to the Fair Housing law, \"Disability\" means, with respect to a person, a physical or mental impairment which substantially limits one or more major life activities; a record of such impairment; or being regarded as having such an impairment. I am also aware that \"Major Life Activities\" means functions such as caring for one's self, performing manual tasks, walking, seeing, hearing, speaking, breathing, learning, and working. It is my professional opinion that Margarita meets the definition of disability under the Americans with Disabilities Act, the Fair Housing Act, and the Rehabilitation Act of 1973.     If necessary, I agree to attest to the validity of this information.    Sincerely,          WILLIAM Samson  [place] License #: [id number]    [facility] [address], [address]  [place] [address]     Telephone: [phone]     Fax: [phone]        Awake/Alert/Cooperative

## 2024-02-26 NOTE — ED ADULT NURSE NOTE - OBJECTIVE STATEMENT
pt came in to ED from home. States "I would like to detox from alcohol." Patient states he was here about a month ago and admitted for detox but AMA'd on day 3. pt reports his last drink was beer 9 hrs ago. pt reports withdrawal symptoms are setting in at this time, c/o nausea, HA feeling shakey. Denies any vomiting. PMh of ETOH. Denies any drug use.

## 2024-02-26 NOTE — H&P ADULT - HISTORY OF PRESENT ILLNESS
A 53-year-old male with a past medical history of ethanol abuse, chronic left fibular neuropathy following a fracture in 2023, and multiple admissions for alcohol withdrawal, including a recent admission on January 23, 2024, now presents requesting alcohol detoxification. The patient stated he is growing weary of his habit and wishes to stop drinking, hence his request for admission for detoxification. He reports typically consuming three 25oz bottles of beer daily and claims that he rarely drinks hard liquor. The patient mentioned that he is unemployed and accruing debt due to his habit. His last drink was this morning. The patient denied experiencing nausea, vomiting, abdominal pain, chest pain, and shortness of breath. Pt claimed that previously he was treated  in  the rehab and he remained sober for several months.     In the ED patient was normotensive, tachycardic to 130, afebrile, saturating well and comfortably on room air.    Relevant labs: Glucose 121, , . lipase- 72. Alcohol level 308   In the ED he received 1mg valium,25 mg librium and IV fluids.

## 2024-02-27 ENCOUNTER — TRANSCRIPTION ENCOUNTER (OUTPATIENT)
Age: 54
End: 2024-02-27

## 2024-02-27 VITALS
TEMPERATURE: 98 F | DIASTOLIC BLOOD PRESSURE: 86 MMHG | SYSTOLIC BLOOD PRESSURE: 127 MMHG | OXYGEN SATURATION: 98 % | RESPIRATION RATE: 18 BRPM | HEART RATE: 107 BPM

## 2024-02-27 DIAGNOSIS — F19.94 OTHER PSYCHOACTIVE SUBSTANCE USE, UNSPECIFIED WITH PSYCHOACTIVE SUBSTANCE-INDUCED MOOD DISORDER: ICD-10-CM

## 2024-02-27 LAB
ALBUMIN SERPL ELPH-MCNC: 3.7 G/DL — SIGNIFICANT CHANGE UP (ref 3.3–5)
ALP SERPL-CCNC: 58 U/L — SIGNIFICANT CHANGE UP (ref 40–120)
ALT FLD-CCNC: 97 U/L — HIGH (ref 10–45)
ANION GAP SERPL CALC-SCNC: 10 MMOL/L — SIGNIFICANT CHANGE UP (ref 5–17)
APPEARANCE UR: CLEAR — SIGNIFICANT CHANGE UP
AST SERPL-CCNC: 114 U/L — HIGH (ref 10–40)
BILIRUB SERPL-MCNC: 0.7 MG/DL — SIGNIFICANT CHANGE UP (ref 0.2–1.2)
BILIRUB UR-MCNC: NEGATIVE — SIGNIFICANT CHANGE UP
BUN SERPL-MCNC: 11 MG/DL — SIGNIFICANT CHANGE UP (ref 7–23)
CALCIUM SERPL-MCNC: 8.3 MG/DL — LOW (ref 8.4–10.5)
CHLORIDE SERPL-SCNC: 101 MMOL/L — SIGNIFICANT CHANGE UP (ref 96–108)
CO2 SERPL-SCNC: 27 MMOL/L — SIGNIFICANT CHANGE UP (ref 22–31)
COLOR SPEC: SIGNIFICANT CHANGE UP
CREAT SERPL-MCNC: 0.89 MG/DL — SIGNIFICANT CHANGE UP (ref 0.5–1.3)
DIFF PNL FLD: NEGATIVE — SIGNIFICANT CHANGE UP
EGFR: 103 ML/MIN/1.73M2 — SIGNIFICANT CHANGE UP
GLUCOSE SERPL-MCNC: 134 MG/DL — HIGH (ref 70–99)
GLUCOSE UR QL: NEGATIVE MG/DL — SIGNIFICANT CHANGE UP
HCT VFR BLD CALC: 36.3 % — LOW (ref 39–50)
HGB BLD-MCNC: 12.5 G/DL — LOW (ref 13–17)
KETONES UR-MCNC: NEGATIVE MG/DL — SIGNIFICANT CHANGE UP
LEUKOCYTE ESTERASE UR-ACNC: NEGATIVE — SIGNIFICANT CHANGE UP
MCHC RBC-ENTMCNC: 34.4 GM/DL — SIGNIFICANT CHANGE UP (ref 32–36)
MCHC RBC-ENTMCNC: 35.6 PG — HIGH (ref 27–34)
MCV RBC AUTO: 103.4 FL — HIGH (ref 80–100)
NITRITE UR-MCNC: NEGATIVE — SIGNIFICANT CHANGE UP
NRBC # BLD: 0 /100 WBCS — SIGNIFICANT CHANGE UP (ref 0–0)
PH UR: 7 — SIGNIFICANT CHANGE UP (ref 5–8)
PLATELET # BLD AUTO: 85 K/UL — LOW (ref 150–400)
POTASSIUM SERPL-MCNC: 3.7 MMOL/L — SIGNIFICANT CHANGE UP (ref 3.5–5.3)
POTASSIUM SERPL-SCNC: 3.7 MMOL/L — SIGNIFICANT CHANGE UP (ref 3.5–5.3)
PROT SERPL-MCNC: 6.7 G/DL — SIGNIFICANT CHANGE UP (ref 6–8.3)
PROT UR-MCNC: NEGATIVE MG/DL — SIGNIFICANT CHANGE UP
RBC # BLD: 3.51 M/UL — LOW (ref 4.2–5.8)
RBC # FLD: 12.3 % — SIGNIFICANT CHANGE UP (ref 10.3–14.5)
SODIUM SERPL-SCNC: 138 MMOL/L — SIGNIFICANT CHANGE UP (ref 135–145)
SP GR SPEC: 1.03 — SIGNIFICANT CHANGE UP (ref 1–1.03)
UROBILINOGEN FLD QL: 1 MG/DL — SIGNIFICANT CHANGE UP (ref 0.2–1)
WBC # BLD: 4.03 K/UL — SIGNIFICANT CHANGE UP (ref 3.8–10.5)
WBC # FLD AUTO: 4.03 K/UL — SIGNIFICANT CHANGE UP (ref 3.8–10.5)

## 2024-02-27 PROCEDURE — 80307 DRUG TEST PRSMV CHEM ANLYZR: CPT

## 2024-02-27 PROCEDURE — 83690 ASSAY OF LIPASE: CPT

## 2024-02-27 PROCEDURE — 36415 COLL VENOUS BLD VENIPUNCTURE: CPT

## 2024-02-27 PROCEDURE — 83735 ASSAY OF MAGNESIUM: CPT

## 2024-02-27 PROCEDURE — 99285 EMERGENCY DEPT VISIT HI MDM: CPT | Mod: 25

## 2024-02-27 PROCEDURE — 84100 ASSAY OF PHOSPHORUS: CPT

## 2024-02-27 PROCEDURE — 85025 COMPLETE CBC W/AUTO DIFF WBC: CPT

## 2024-02-27 PROCEDURE — 96374 THER/PROPH/DIAG INJ IV PUSH: CPT

## 2024-02-27 PROCEDURE — 85027 COMPLETE CBC AUTOMATED: CPT

## 2024-02-27 PROCEDURE — 90792 PSYCH DIAG EVAL W/MED SRVCS: CPT

## 2024-02-27 PROCEDURE — 99233 SBSQ HOSP IP/OBS HIGH 50: CPT | Mod: GC

## 2024-02-27 PROCEDURE — 80053 COMPREHEN METABOLIC PANEL: CPT

## 2024-02-27 PROCEDURE — 96375 TX/PRO/DX INJ NEW DRUG ADDON: CPT

## 2024-02-27 RX ADMIN — Medication 1 MILLIGRAM(S): at 11:40

## 2024-02-27 RX ADMIN — Medication 1 TABLET(S): at 11:40

## 2024-02-27 RX ADMIN — Medication 2 MILLIGRAM(S): at 05:59

## 2024-02-27 RX ADMIN — Medication 100 MILLIGRAM(S): at 11:40

## 2024-02-27 RX ADMIN — ONDANSETRON 4 MILLIGRAM(S): 8 TABLET, FILM COATED ORAL at 05:59

## 2024-02-27 RX ADMIN — Medication 2 MILLIGRAM(S): at 01:20

## 2024-02-27 RX ADMIN — Medication 2 MILLIGRAM(S): at 09:37

## 2024-02-27 RX ADMIN — HEPARIN SODIUM 5000 UNIT(S): 5000 INJECTION INTRAVENOUS; SUBCUTANEOUS at 06:00
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
## 2024-02-27 NOTE — BH CONSULTATION LIAISON ASSESSMENT NOTE - NSBHCONSULTRECOMMENDOTHER_PSY_A_CORE FT
- Lengthy discussion with patient about goals/plans of care. Patient encouraged to NOT sign out AMA.   - Patient interested in outpatient substance use rehab services. Discussed resources that would be appropriate for patient.

## 2024-02-27 NOTE — DIETITIAN INITIAL EVALUATION ADULT - PERTINENT MEDS FT
MEDICATIONS  (STANDING):  folic acid 1 milliGRAM(s) Oral daily  heparin   Injectable 5000 Unit(s) SubCutaneous every 12 hours  influenza   Vaccine 0.5 milliLiter(s) IntraMuscular once  multivitamin 1 Tablet(s) Oral daily  sodium chloride 0.9%. 1000 milliLiter(s) (80 mL/Hr) IV Continuous <Continuous>  thiamine 100 milliGRAM(s) Oral daily    MEDICATIONS  (PRN):  aluminum hydroxide/magnesium hydroxide/simethicone Suspension 30 milliLiter(s) Oral every 4 hours PRN Dyspepsia  LORazepam   Injectable 2 milliGRAM(s) IV Push every 1 hour PRN Symptom-triggered: each CIWA -Ar score 8 or GREATER  melatonin 3 milliGRAM(s) Oral at bedtime PRN Insomnia  ondansetron Injectable 4 milliGRAM(s) IV Push every 8 hours PRN Nausea and/or Vomiting

## 2024-02-27 NOTE — DIETITIAN INITIAL EVALUATION ADULT - ORAL INTAKE PTA/DIET HISTORY
Patient endorses decreased appetite prior to admission. Reports 34 lb unintentional weight loss x 4 months. States he consumes "super small meals". Patient consumes chocolate ensures at home + MVI, thiamine, and folic acid supplements. Patient denies any food allergies/intolerances but does not consume

## 2024-02-27 NOTE — DIETITIAN INITIAL EVALUATION ADULT - ADD RECOMMEND
1. Add Ensure Plus High Protein 8oz PO TID (Provides 1,050kcal & 60grams of Protein) to enhance PO intakes and optimize nutritional status   2. Recommend MVI, thiamine, folic acid supplement 2/2 ETOH abuse  3. Monitor daily PO intakes, GI tolerance, labs, weights, skin integrity, & BM regularity

## 2024-02-27 NOTE — BH CONSULTATION LIAISON ASSESSMENT NOTE - PAST PSYCHOTROPIC MEDICATION
Patient reports being on vivitrol, naltrexone 50mg, and Acamprosate 666mg TID.  Patient reports being on Vivitrol, naltrexone 50mg, and Acamprosate 666mg TID.

## 2024-02-27 NOTE — PROGRESS NOTE ADULT - TIME BILLING
care coordination, plan of care discussed with patient face to face, 2 [facility] IDR team

## 2024-02-27 NOTE — DISCHARGE NOTE PROVIDER - NSDCCPCAREPLAN_GEN_ALL_CORE_FT
PRINCIPAL DISCHARGE DIAGNOSIS  Diagnosis: Alcohol dependence with withdrawal  Assessment and Plan of Treatment: Alcohol Use Disorder  Alcohol use disorder is when your drinking disrupts your daily life. When you have this condition, you drink too much alcohol and you cannot control your drinking.  Alcohol use disorder can cause serious problems with your physical health. It can affect your brain, heart, liver, pancreas, immune system, stomach, and intestines. Alcohol use disorder can increase your risk for certain cancers and cause problems with your mental health, such as depression, anxiety, psychosis, delirium, and dementia. People with this disorder risk hurting themselves and others.  What are the causes?  This condition is caused by drinking too much alcohol over time. It is not caused by drinking too much alcohol only one or two times. Some people with this condition drink alcohol to cope with or escape from negative life events. Others drink to relieve pain or symptoms of mental illness.  What increases the risk?  You are more likely to develop this condition if:  You have a family history of alcohol use disorder.  Your culture encourages drinking to the point of intoxication, or makes alcohol easy to get.  You had a mood or conduct disorder in childhood.  You have been a victim of abuse.  You are an adolescent and:  You have poor grades or difficulties in school.  Your caregivers do not talk to you about saying no to alcohol, or supervise your activities.  You are impulsive or you have trouble with self-control.  What are the signs or symptoms?  Symptoms of this condition include:  Drinking more than you want to.  Drinking for longer than you want to.  Trying several times to drink less or to control your drinking.  Spending a lot of time getting alcohol, drinking, or recovering from drinking.  Craving alcohol.  Having problems at work, at school, or at home due to drinking.  Having problems in relationships due to drinking.  Drinking when it is dangerous to drink, such as before driving a car.  Continuing to drink even though you know you might have a physical or mental problem rela

## 2024-02-27 NOTE — DIETITIAN INITIAL EVALUATION ADULT - PERTINENT LABORATORY DATA
02-27    138  |  101  |  11  ----------------------------<  134<H>  3.7   |  27  |  0.89    Ca    8.3<L>      27 Feb 2024 06:31  Phos  3.6     02-26  Mg     1.6     02-26    TPro  6.7  /  Alb  3.7  /  TBili  0.7  /  DBili  x   /  AST  114<H>  /  ALT  97<H>  /  AlkPhos  58  02-27  A1C with Estimated Average Glucose Result: 4.8 % (01-24-24 @ 06:41)

## 2024-02-27 NOTE — BH CONSULTATION LIAISON ASSESSMENT NOTE - NSBHCHARTREVIEWVS_PSY_A_CORE FT
Vital Signs Last 24 Hrs  T(C): 36.6 (27 Feb 2024 15:33), Max: 37.6 (26 Feb 2024 19:30)  T(F): 97.9 (27 Feb 2024 15:33), Max: 99.6 (26 Feb 2024 19:30)  HR: 107 (27 Feb 2024 15:33) (69 - 119)  BP: 127/86 (27 Feb 2024 15:33) (110/71 - 127/86)  BP(mean): --  RR: 18 (27 Feb 2024 15:33) (18 - 18)  SpO2: 98% (27 Feb 2024 15:33) (95% - 98%)    Parameters below as of 27 Feb 2024 15:33  Patient On (Oxygen Delivery Method): room air

## 2024-02-27 NOTE — DISCHARGE NOTE NURSING/CASE MANAGEMENT/SOCIAL WORK - PATIENT PORTAL LINK FT
You can access the FollowMyHealth Patient Portal offered by [facility] by registering at the following website: [email or web address]. By joining [email or web address]’s FollowMyHealth portal, you will also be able to view your health information using other applications (apps) compatible with our system.

## 2024-02-27 NOTE — BH CONSULTATION LIAISON ASSESSMENT NOTE - NSBHCONSULTFOLLOWAFTERCARE_PSY_A_CORE FT
Will continue to encourage sober lifestyle and pursuit not just of abstinence but to engage in recovery.   Recommended intensive outpatient substance use rehab:  [facility]: [phone]  [facility]: [phone]  [facility] [place] [facility]: [phone]; opt 3.
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
## 2024-02-27 NOTE — DIETITIAN NUTRITION RISK NOTIFICATION - TREATMENT: THE FOLLOWING DIET HAS BEEN RECOMMENDED
Diet, Regular:   Supplement Feeding Modality:  Oral  Ensure Plus High Protein Cans or Servings Per Day:  1       Frequency:  Three Times a day (02-27-24 @ 13:39) [Pending Verification By Attending]  Diet, Regular (02-26-24 @ 15:50) [Active]

## 2024-02-27 NOTE — BH CONSULTATION LIAISON ASSESSMENT NOTE - ADULT OR CHILD PROTECTIVE SERVICES INVOLVEMENT
How Severe Is Your Rosacea?: mild Is This A New Presentation, Or A Follow-Up?: Follow Up Rosacea Additional History: States Soolantra is very expensive would like aLternative No

## 2024-02-27 NOTE — DISCHARGE NOTE PROVIDER - HOSPITAL COURSE
Hospital Course  HPI:  A 53-year-old male with a past medical history of ethanol abuse, chronic left fibular neuropathy following a fracture in 2023, and multiple admissions for alcohol withdrawal, including a recent admission on January 23, 2024, now presents requesting alcohol detoxification. The patient stated he is growing weary of his habit and wishes to stop drinking, hence his request for admission for detoxification. He reports typically consuming three 25oz bottles of beer daily and claims that he rarely drinks hard liquor. The patient mentioned that he is unemployed and accruing debt due to his habit. His last drink was this morning. The patient denied experiencing nausea, vomiting, abdominal pain, chest pain, and shortness of breath. Pt claimed that previously he was treated  in  the rehab and he remained sober for several months.     In the ED patient was normotensive, tachycardic to 130, afebrile, saturating well and comfortably on room air.    Relevant labs: Glucose 121, , . lipase- 72. Alcohol level 308   In the ED he received 1mg valium,25 mg librium and IV fluids.    (26 Feb 2024 15:26)    Pt was transitioned to ativan symptom triggered today.    Discussed risks of leaving against medical advice, which includes worsening of current medical condition, up to and including death. Patient understands risks and still wishes to leave. AMA paperwork signed.       You will need to follow up with your primary care physician.      Discharging Provider:  Dr. Marcela Youngblood, Dr. Teixeira  Contact Info: [phone]- Please call with any questions or concerns.

## 2024-02-27 NOTE — BH CONSULTATION LIAISON ASSESSMENT NOTE - SUMMARY
53M with PMH of Etoh abuse, chronic L fibular neuropathy after fracture 2023, multiple admissions for alcohol withdrawal recent admission for alcohol withdrawal 23/1/24 , presents now   requesting alcohol detox. Psychiatry consulted for alcohol use disorder.    Patient reports he came to [facility] for detox, history of leaving AMA. However, reports he is intending to stay until Thursday to connect him with appropriate outpatient psychiatric resources. Reports ongoing depression, anxiety, and daily alcohol use.

## 2024-02-27 NOTE — DIETITIAN INITIAL EVALUATION ADULT - CALCULATED FROM (CAL/KG)
Subjective:       Patient ID: Colby Maldonado is a 3 y.o. male.    Vitals:  weight is 16.2 kg (35 lb 9.7 oz). His tympanic temperature is 102.6 °F (39.2 °C) (abnormal). His blood pressure is 117/71 (abnormal) and his pulse is 147 (abnormal). His respiration is 26 and oxygen saturation is 98%.     Chief Complaint: Sore Throat    Pt mom request strep test. Mom states fever started Monday. Pt mom states patient refused all medications. ER tested for flu, covid, rsv-negative.  Pt c/o sore throat, fever.     Sore Throat  This is a new problem. The current episode started in the past 7 days. The problem occurs constantly. The problem has been gradually worsening. Associated symptoms include anorexia, chills, coughing, a fever, myalgias and a sore throat. Pertinent negatives include no abdominal pain, arthralgias, change in bowel habit, chest pain, congestion, diaphoresis, fatigue, headaches, joint swelling, nausea, neck pain, numbness, rash, swollen glands, urinary symptoms, vertigo, visual change, vomiting or weakness. Nothing aggravates the symptoms. He has tried NSAIDs for the symptoms. The treatment provided no relief.     Constitution: Positive for chills and fever. Negative for sweating and fatigue.   HENT:  Positive for sore throat. Negative for ear pain, congestion, postnasal drip, sinus pain, sinus pressure and trouble swallowing.    Neck: Negative for neck pain and painful lymph nodes.   Cardiovascular:  Negative for chest pain.   Respiratory:  Positive for cough. Negative for sputum production and shortness of breath.    Gastrointestinal:  Negative for abdominal pain, nausea, vomiting and diarrhea.   Musculoskeletal:  Positive for muscle ache. Negative for joint pain and joint swelling.   Skin:  Negative for rash.   Neurological:  Negative for history of vertigo, headaches and numbness.   Hematologic/Lymphatic: Negative for swollen lymph nodes.     Objective:      Physical Exam   Constitutional: He appears  well-developed.  Non-toxic appearance. He does not appear ill. No distress.   HENT:   Head: Atraumatic. No hematoma. No signs of injury. There is normal jaw occlusion.   Ears:   Right Ear: Tympanic membrane normal. Tympanic membrane is not erythematous and not bulging.   Left Ear: Tympanic membrane normal. Tympanic membrane is not erythematous and not bulging.   Nose: Congestion present. No rhinorrhea.   Mouth/Throat: Mucous membranes are moist. Posterior oropharyngeal erythema present. No oropharyngeal exudate. Oropharynx is clear.   Eyes: Conjunctivae and lids are normal. Visual tracking is normal. Right eye exhibits no exudate. Left eye exhibits no exudate. No scleral icterus.   Neck: Neck supple. No neck rigidity present.   Cardiovascular: Normal rate, regular rhythm, S1 normal and normal heart sounds.   No murmur heard.Pulses are strong.   Pulmonary/Chest: Effort normal and breath sounds normal. No nasal flaring or stridor. No respiratory distress. He has no wheezes. He exhibits no retraction.   Abdominal: He exhibits no distension and no mass. Soft. There is no abdominal tenderness. There is no rigidity.   Musculoskeletal: Normal range of motion.         General: No tenderness or deformity. Normal range of motion.   Lymphadenopathy:     He has cervical adenopathy.   Neurological: He is alert. He sits and stands.   Skin: Skin is warm, moist, not diaphoretic, not pale, no rash and not purpuric. Capillary refill takes less than 2 seconds. No petechiae jaundice  Nursing note and vitals reviewed.      Results for orders placed or performed in visit on 11/09/22   POCT Strep A, Molecular   Result Value Ref Range    Molecular Strep A, POC Negative Negative     Acceptable Yes        Assessment:       1. Viral URI          Plan:         Viral URI  -     POCT Strep A, Molecular       Discussed results with patient.  Discussed use of OTC medications for symptom control as this is likely a viral disease.    All ER precautions covered including but not limited to shortness of breath, intractable fever, or chest pain.  Discussed RTC if symptoms worsen, change, or persist.     Patient verbalized understanding and agreed with the plan.     Xena Stoll PA-C    Patient Instructions   General Discharge Instructions for Children   If your child was prescribed antibiotics, please take them to completion.  You must understand that you've received an Urgent Care treatment only and that you may be released before all your medical problems are known or treated. You, the parent  will arrange for follow up care as instructed.  Follow up with your child's pediatrician as directed in the next 1-2 days if not improved or as needed.  If your condition worsens we recommend that you receive another evaluation at the emergency room immediately or contact your pediatrician clinics after hours call service to discuss your concerns.  Please go to the Emergency Department for any concerns or worsening of condition.                 [phone]

## 2024-02-27 NOTE — BH CONSULTATION LIAISON ASSESSMENT NOTE - NSBHCHARTREVIEWLAB_PSY_A_CORE FT
Complete Blood Count in AM (02.27.24 @ 06:31)   Nucleated RBC: 0 /100 WBCs  WBC Count: 4.03 K/uL  RBC Count: 3.51 M/uL  Hemoglobin: 12.5 g/dL  Hematocrit: 36.3 %  Mean Cell Volume: 103.4 fl  Mean Cell Hemoglobin: 35.6 pg  Mean Cell Hemoglobin Conc: 34.4 gm/dL  Red Cell Distrib Width: 12.3 %  Platelet Count - Automated: 85 K/uL    Comprehensive Metabolic Panel in AM (02.27.24 @ 06:31)   Sodium: 138 mmol/L  Potassium: 3.7 mmol/L  Chloride: 101 mmol/L  Carbon Dioxide: 27 mmol/L  Anion Gap: 10 mmol/L  Blood Urea Nitrogen: 11 mg/dL  Creatinine: 0.89 mg/dL  Glucose: 134 mg/dL  Calcium: 8.3 mg/dL  Protein Total: 6.7 g/dL  Albumin: 3.7 g/dL  Bilirubin Total: 0.7 mg/dL  Alkaline Phosphatase: 58 U/L  Aspartate Aminotransferase (AST/SGOT): 114 U/L  Alanine Aminotransferase (ALT/SGPT): 97 U/L  eGFR: 103

## 2024-02-27 NOTE — PROGRESS NOTE ADULT - SUBJECTIVE AND OBJECTIVE BOX
Patient is a 53y old  Male who presents with a chief complaint of     INTERVAL HPI/OVERNIGHT EVENTS: Patient seen and examined at bedside. No overnight events.    MEDICATIONS  (STANDING):  folic acid 1 milliGRAM(s) Oral daily  heparin   Injectable 5000 Unit(s) SubCutaneous every 12 hours  influenza   Vaccine 0.5 milliLiter(s) IntraMuscular once  multivitamin 1 Tablet(s) Oral daily  sodium chloride 0.9%. 1000 milliLiter(s) (80 mL/Hr) IV Continuous <Continuous>  thiamine 100 milliGRAM(s) Oral daily    MEDICATIONS  (PRN):  aluminum hydroxide/magnesium hydroxide/simethicone Suspension 30 milliLiter(s) Oral every 4 hours PRN Dyspepsia  LORazepam   Injectable 2 milliGRAM(s) IV Push every 1 hour PRN Symptom-triggered: each CIWA -Ar score 8 or GREATER  melatonin 3 milliGRAM(s) Oral at bedtime PRN Insomnia  ondansetron Injectable 4 milliGRAM(s) IV Push every 8 hours PRN Nausea and/or Vomiting      Allergies    shellfish (Unknown)  fish (Unknown)  penicillin (Unknown)    Intolerances        REVIEW OF SYSTEMS:  CONSTITUTIONAL: No fever or chills  HEENT:  No headache, no sore throat  RESPIRATORY: No cough, wheezing, or shortness of breath  CARDIOVASCULAR: No chest pain, palpitations  GASTROINTESTINAL: No abd pain, nausea, vomiting, or diarrhea  GENITOURINARY: No dysuria, frequency, or hematuria  NEUROLOGICAL: no focal weakness or dizziness  MUSCULOSKELETAL: no myalgias     Vital Signs Last 24 Hrs  T(C): 37 (2024 05:18), Max: 37.6 (2024 19:30)  T(F): 98.6 (2024 05:18), Max: 99.6 (2024 19:30)  HR: 69 (2024 05:18) (69 - 119)  BP: 115/75 (2024 05:18) (110/71 - 132/77)  BP(mean): 96 (2024 16:28) (92 - 98)  RR: 18 (2024 05:18) (16 - 18)  SpO2: 97% (2024 05:18) (95% - 98%)    Parameters below as of 2024 05:18  Patient On (Oxygen Delivery Method): room air      I&O's Summary    2024 07:  -  2024 07:00  --------------------------------------------------------  IN: 640 mL / OUT: 0 mL / NET: 640 mL    2024 07:  -  2024 13:52  --------------------------------------------------------  IN: 560 mL / OUT: 0 mL / NET: 560 mL      BMI (kg/m2): 19.4 (24 @ 11:45)    PHYSICAL EXAM:  GENERAL: NAD, lying in bed comfortably  HEAD:  Atraumatic, Normocephalic  EYES: EOMI, PERRLA, conjunctiva and sclera clear  ENT: Moist mucous membranes  NECK: Supple, No JVD  CHEST/LUNG: Clear to auscultation bilaterally; No rales, rhonchi, wheezing, or rubs. Unlabored respirations  HEART: Regular rate and rhythm; No murmurs, rubs, or gallops  ABDOMEN: Bowel sounds present; Soft, Nontender, Nondistended. No hepatomegaly  EXTREMITIES:  2+ Peripheral Pulses, brisk capillary refill. No clubbing, cyanosis, or edema  NERVOUS SYSTEM:  Alert & Oriented X3, speech clear. No deficits   MSK: FROM all 4 extremities, full and equal strength  SKIN: No rashes or lesions. Mild ecchymosis noted on the right foot dorsal surface.      LABS: Personally reviewed  CBC                        12.5   4.03  )-----------( 85       ( 2024 06:31 )             36.3     CMP      138  |  101  |  11  ----------------------------<  134  3.7   |  27  |  0.89    Ca    8.3      2024 06:31  Phos  3.6     -  Mg     1.6         TPro  6.7  /  Alb  3.7  /  TBili  0.7  /  DBili  x   /  AST  114  /  ALT  97  /  AlkPhos  58        Lipase: 72 U/L (24 @ 13:45)       Chol 233 mg/dL LDL --  mg/dL Trig 51 mg/dL      Urinalysis Basic - ( 2024 06:40 )    Color: Dark Yellow / Appearance: Clear / S.026 / pH: x  Gluc: x / Ketone: Negative mg/dL  / Bili: Negative / Urobili: 1.0 mg/dL   Blood: x / Protein: Negative mg/dL / Nitrite: Negative   Leuk Esterase: Negative / RBC: x / WBC x   Sq Epi: x / Non Sq Epi: x / Bacteria: x                RADIOLOGY & ADDITIONAL TESTS: Personally reviewed.     Consultant(s) Notes Reviewed:  [x] YES  [ ] NO   Discussed with ZHAO/DIRK, RN

## 2024-02-27 NOTE — DISCHARGE NOTE PROVIDER - PROVIDER TOKENS
FREE:[LAST:[.],FIRST:[.],PHONE:[(   )    -],FAX:[(   )    -],ADDRESS:[Family Practice],FOLLOWUP:[1 week]]

## 2024-02-27 NOTE — BH CONSULTATION LIAISON ASSESSMENT NOTE - OTHER PAST PSYCHIATRIC HISTORY (INCLUDE DETAILS REGARDING ONSET, COURSE OF ILLNESS, INPATIENT/OUTPATIENT TREATMENT)
Patient reports longstanding poly substance use disorder, depression, and anxiety. Denies any inpatient psychiatric hospitalizations or current outpatient psychiatric provider.

## 2024-02-27 NOTE — PROGRESS NOTE ADULT - ATTENDING COMMENTS
53M with PMH of Etoh abuse, chronic L fibular neuropathy after fracture 2023, multiple admissions for alcohol withdrawal recent admission for alcohol withdrawal 23/1/24 , presents now   requesting alcohol detox. Plan: monitor on ciwa, apprec sw and psych consult, pt left AMA in PM, reinforced  risks of leaving AMA

## 2024-02-27 NOTE — BH CONSULTATION LIAISON ASSESSMENT NOTE - NSBHSACONSEQUENCE_PSY_A_CORE FT
Patient reports longstanding and disorganized substance use history including multiple detox programs. Patient also reports history of IOP at [facility] in Nov 2022 and prior to that [facility] 6 years ago.

## 2024-02-27 NOTE — BH CONSULTATION LIAISON ASSESSMENT NOTE - DIFFERENTIAL
Uncomplicated alcohol withdrawal.   Substance induced mood disorder with onset during withdrawal.

## 2024-02-27 NOTE — DIETITIAN INITIAL EVALUATION ADULT - OTHER INFO
A 53-year-old male with a past medical history of ethanol abuse, chronic left fibular neuropathy following a fracture in 2023, and multiple admissions for alcohol withdrawal, including a recent admission on January 23, 2024, now presents requesting alcohol detoxification. The patient stated he is growing weary of his habit and wishes to stop drinking, hence his request for admission for detoxification. He reports typically consuming three 25oz bottles of beer daily and claims that he rarely drinks hard liquor. The patient mentioned that he is unemployed and accruing debt due to his habit. His last drink was this morning. The patient denied experiencing nausea, vomiting, abdominal pain, chest pain, and shortness of breath. Pt claimed that previously he was treated  in  the rehab and he remained sober for several months.

## 2024-02-27 NOTE — BH CONSULTATION LIAISON ASSESSMENT NOTE - ATTENDING COMMENTS
Pt has signed out AMA the last admission and this admission as well.   Unclear if legal issues are pressuring his admittance to treatment , rather than a desire to be abstinent.   Encourage sober lifestyle and involvement in community supports like AA, but pt most likely will need to be mandated by outside source for   treatment to address his needs.   Pt at this point did not meet criteria for acute inpatient psychiatric care.

## 2024-02-27 NOTE — BH CONSULTATION LIAISON ASSESSMENT NOTE - NSCOMMENTSUICRISKFACT_PSY_ALL_CORE
Pt had endorsed suicidality on SBIRT screen. This may also have been influenced either by intoxication and withdrawal.

## 2024-02-27 NOTE — BH CONSULTATION LIAISON ASSESSMENT NOTE - RISK ASSESSMENT
While patient is currently withdrawing with depressing symptoms; he denies any feelings of suicidal ideation and has no history of suicidality. Pt with no previous psychiatric history in terms of inpatient care, presented voluntarily for detox, however pt has a long history of poor interpersonal relationships and is s/p incarceration for violation of OOP, pt denies at this time homicidal or self harm ideation.

## 2024-02-27 NOTE — PROGRESS NOTE ADULT - ASSESSMENT
53M with PMH of Etoh abuse, chronic L fibular neuropathy after fracture 2023, multiple admissions for alcohol withdrawal recent admission for alcohol withdrawal 23/1/24 , presents now   requesting alcohol detox.    #Alcohol Abuse and  withdrawal  -Usually drinks x3 25oz bottles of beer daily- Last drink was 2/26/24 am   -Alcohol level 308  -Denies h/o intubations  -Fall & seizure precautions   -Multivitamin, thiamine 100mg, folic acid 1mg   - S/P one dose of ativan  and librium   -Will start on ativan taper  -Zofran 4mg IV Q8H PRN nausea and/or vomiting  - consult    #Transaminitis  - , ALT 97  - Mild transaminitis observed from prior admissions  - Probably due to chronic alcohol use     DVT ppx: Heparin bid     Code: Patient is FULL CODE at this time, consents to chest compressions and intubation if deemed medically necessary        53M with PMH of Etoh abuse, chronic L fibular neuropathy after fracture 2023, multiple admissions for alcohol withdrawal recent admission for alcohol withdrawal 23/1/24 , presents now   requesting alcohol detox.    #Alcohol Abuse and  withdrawal  -Usually drinks x3 25oz bottles of beer daily- Last drink was 2/26/24 am   -Alcohol level 308  -Denies h/o intubations  -Fall & seizure precautions   -Multivitamin, thiamine 100mg, folic acid 1mg   - S/P one dose of ativan  and librium   -Will start on ativan taper  -Zofran 4mg IV Q8H PRN nausea and/or vomiting  - consult    #Transaminitis  - , ALT 97  - Mild transaminitis observed from prior admissions  - Probably due to chronic alcohol use     DVT ppx: mobile pt     Code: Patient is FULL CODE at this time, consents to chest compressions and intubation if deemed medically necessary        53M with PMH of Etoh abuse, chronic L fibular neuropathy after fracture 2023, multiple admissions for alcohol withdrawal recent admission for alcohol withdrawal 23/1/24 , presents now   requesting alcohol detox.    #Alcohol Abuse and  withdrawal  -Usually drinks x3 25oz bottles of beer daily- Last drink was 2/26/24 am   -Alcohol level 308  -Denies h/o intubations  -Fall & seizure precautions   -Multivitamin, thiamine 100mg, folic acid 1mg   - S/P one dose of ativan  and librium   - ativan symptom triggered  -Zofran 4mg IV Q8H PRN nausea and/or vomiting  - consult  - psych consulted    #Transaminitis  - , ALT 97  - Mild transaminitis observed from prior admissions  - Probably due to chronic alcohol use     DVT ppx: mobile pt     Code: Patient is FULL CODE at this time, consents to chest compressions and intubation if deemed medically necessary

## 2024-03-18 ENCOUNTER — INPATIENT (INPATIENT)
Facility: HOSPITAL | Age: 54
LOS: 4 days | Discharge: LEFT BEFORE DISCHARGE | DRG: 894 | End: 2024-03-23
Attending: FAMILY MEDICINE | Admitting: INTERNAL MEDICINE
Payer: MEDICAID

## 2024-03-18 VITALS
TEMPERATURE: 97 F | OXYGEN SATURATION: 100 % | DIASTOLIC BLOOD PRESSURE: 120 MMHG | HEIGHT: 70 IN | WEIGHT: 149.91 LBS | SYSTOLIC BLOOD PRESSURE: 188 MMHG | HEART RATE: 117 BPM | RESPIRATION RATE: 20 BRPM

## 2024-03-18 DIAGNOSIS — F10.939 ALCOHOL USE, UNSPECIFIED WITH WITHDRAWAL, UNSPECIFIED: ICD-10-CM

## 2024-03-18 LAB
ALBUMIN SERPL ELPH-MCNC: 3.4 G/DL — SIGNIFICANT CHANGE UP (ref 3.3–5)
ALBUMIN SERPL ELPH-MCNC: 4.4 G/DL — SIGNIFICANT CHANGE UP (ref 3.3–5)
ALP SERPL-CCNC: 58 U/L — SIGNIFICANT CHANGE UP (ref 40–120)
ALP SERPL-CCNC: 72 U/L — SIGNIFICANT CHANGE UP (ref 40–120)
ALT FLD-CCNC: 46 U/L — HIGH (ref 10–45)
ALT FLD-CCNC: 53 U/L — HIGH (ref 10–45)
AMPHET UR-MCNC: NEGATIVE — SIGNIFICANT CHANGE UP
ANION GAP SERPL CALC-SCNC: 16 MMOL/L — SIGNIFICANT CHANGE UP (ref 5–17)
ANION GAP SERPL CALC-SCNC: 9 MMOL/L — SIGNIFICANT CHANGE UP (ref 5–17)
APAP SERPL-MCNC: <1 UG/ML — LOW (ref 10–30)
APPEARANCE UR: CLEAR — SIGNIFICANT CHANGE UP
AST SERPL-CCNC: 62 U/L — HIGH (ref 10–40)
AST SERPL-CCNC: 66 U/L — HIGH (ref 10–40)
BARBITURATES UR SCN-MCNC: NEGATIVE — SIGNIFICANT CHANGE UP
BASOPHILS # BLD AUTO: 0.04 K/UL — SIGNIFICANT CHANGE UP (ref 0–0.2)
BASOPHILS NFR BLD AUTO: 0.4 % — SIGNIFICANT CHANGE UP (ref 0–2)
BENZODIAZ UR-MCNC: NEGATIVE — SIGNIFICANT CHANGE UP
BILIRUB DIRECT SERPL-MCNC: 0.2 MG/DL — SIGNIFICANT CHANGE UP (ref 0–0.3)
BILIRUB INDIRECT FLD-MCNC: 0.6 MG/DL — SIGNIFICANT CHANGE UP (ref 0.2–1)
BILIRUB SERPL-MCNC: 0.8 MG/DL — SIGNIFICANT CHANGE UP (ref 0.2–1.2)
BILIRUB SERPL-MCNC: 0.8 MG/DL — SIGNIFICANT CHANGE UP (ref 0.2–1.2)
BILIRUB UR-MCNC: NEGATIVE — SIGNIFICANT CHANGE UP
BUN SERPL-MCNC: 11 MG/DL — SIGNIFICANT CHANGE UP (ref 7–23)
BUN SERPL-MCNC: 9 MG/DL — SIGNIFICANT CHANGE UP (ref 7–23)
CALCIUM SERPL-MCNC: 10.6 MG/DL — HIGH (ref 8.4–10.5)
CALCIUM SERPL-MCNC: 8.8 MG/DL — SIGNIFICANT CHANGE UP (ref 8.4–10.5)
CHLORIDE SERPL-SCNC: 90 MMOL/L — LOW (ref 96–108)
CHLORIDE SERPL-SCNC: 98 MMOL/L — SIGNIFICANT CHANGE UP (ref 96–108)
CO2 SERPL-SCNC: 27 MMOL/L — SIGNIFICANT CHANGE UP (ref 22–31)
CO2 SERPL-SCNC: 27 MMOL/L — SIGNIFICANT CHANGE UP (ref 22–31)
COCAINE METAB.OTHER UR-MCNC: NEGATIVE — SIGNIFICANT CHANGE UP
COLOR SPEC: YELLOW — SIGNIFICANT CHANGE UP
CREAT SERPL-MCNC: 0.85 MG/DL — SIGNIFICANT CHANGE UP (ref 0.5–1.3)
CREAT SERPL-MCNC: 0.96 MG/DL — SIGNIFICANT CHANGE UP (ref 0.5–1.3)
DIFF PNL FLD: NEGATIVE — SIGNIFICANT CHANGE UP
EGFR: 104 ML/MIN/1.73M2 — SIGNIFICANT CHANGE UP
EGFR: 95 ML/MIN/1.73M2 — SIGNIFICANT CHANGE UP
EOSINOPHIL # BLD AUTO: 0 K/UL — SIGNIFICANT CHANGE UP (ref 0–0.5)
EOSINOPHIL NFR BLD AUTO: 0 % — SIGNIFICANT CHANGE UP (ref 0–6)
ETHANOL SERPL-MCNC: <3 MG/DL — SIGNIFICANT CHANGE UP (ref 0–3)
GLUCOSE SERPL-MCNC: 145 MG/DL — HIGH (ref 70–99)
GLUCOSE SERPL-MCNC: 96 MG/DL — SIGNIFICANT CHANGE UP (ref 70–99)
GLUCOSE UR QL: NEGATIVE MG/DL — SIGNIFICANT CHANGE UP
HCT VFR BLD CALC: 37.3 % — LOW (ref 39–50)
HGB BLD-MCNC: 13.4 G/DL — SIGNIFICANT CHANGE UP (ref 13–17)
IMM GRANULOCYTES NFR BLD AUTO: 0.5 % — SIGNIFICANT CHANGE UP (ref 0–0.9)
KETONES UR-MCNC: NEGATIVE MG/DL — SIGNIFICANT CHANGE UP
LEUKOCYTE ESTERASE UR-ACNC: NEGATIVE — SIGNIFICANT CHANGE UP
LYMPHOCYTES # BLD AUTO: 0.41 K/UL — LOW (ref 1–3.3)
LYMPHOCYTES # BLD AUTO: 4.2 % — LOW (ref 13–44)
MAGNESIUM SERPL-MCNC: 1.8 MG/DL — SIGNIFICANT CHANGE UP (ref 1.6–2.6)
MAGNESIUM SERPL-MCNC: 1.9 MG/DL — SIGNIFICANT CHANGE UP (ref 1.6–2.6)
MCHC RBC-ENTMCNC: 35.6 PG — HIGH (ref 27–34)
MCHC RBC-ENTMCNC: 35.9 GM/DL — SIGNIFICANT CHANGE UP (ref 32–36)
MCV RBC AUTO: 99.2 FL — SIGNIFICANT CHANGE UP (ref 80–100)
METHADONE UR-MCNC: NEGATIVE — SIGNIFICANT CHANGE UP
MONOCYTES # BLD AUTO: 0.81 K/UL — SIGNIFICANT CHANGE UP (ref 0–0.9)
MONOCYTES NFR BLD AUTO: 8.3 % — SIGNIFICANT CHANGE UP (ref 2–14)
NEUTROPHILS # BLD AUTO: 8.48 K/UL — HIGH (ref 1.8–7.4)
NEUTROPHILS NFR BLD AUTO: 86.6 % — HIGH (ref 43–77)
NITRITE UR-MCNC: NEGATIVE — SIGNIFICANT CHANGE UP
NRBC # BLD: 0 /100 WBCS — SIGNIFICANT CHANGE UP (ref 0–0)
OPIATES UR-MCNC: NEGATIVE — SIGNIFICANT CHANGE UP
PCP SPEC-MCNC: SIGNIFICANT CHANGE UP
PCP UR-MCNC: NEGATIVE — SIGNIFICANT CHANGE UP
PH UR: 7 — SIGNIFICANT CHANGE UP (ref 5–8)
PHOSPHATE SERPL-MCNC: 2.6 MG/DL — SIGNIFICANT CHANGE UP (ref 2.5–4.5)
PLATELET # BLD AUTO: 124 K/UL — LOW (ref 150–400)
POTASSIUM SERPL-MCNC: 3.4 MMOL/L — LOW (ref 3.5–5.3)
POTASSIUM SERPL-MCNC: 3.4 MMOL/L — LOW (ref 3.5–5.3)
POTASSIUM SERPL-SCNC: 3.4 MMOL/L — LOW (ref 3.5–5.3)
POTASSIUM SERPL-SCNC: 3.4 MMOL/L — LOW (ref 3.5–5.3)
PROT SERPL-MCNC: 6.5 G/DL — SIGNIFICANT CHANGE UP (ref 6–8.3)
PROT SERPL-MCNC: 7.8 G/DL — SIGNIFICANT CHANGE UP (ref 6–8.3)
PROT UR-MCNC: NEGATIVE MG/DL — SIGNIFICANT CHANGE UP
RBC # BLD: 3.76 M/UL — LOW (ref 4.2–5.8)
RBC # FLD: 11.7 % — SIGNIFICANT CHANGE UP (ref 10.3–14.5)
SALICYLATES SERPL-MCNC: 2.3 MG/DL — LOW (ref 3–30)
SODIUM SERPL-SCNC: 133 MMOL/L — LOW (ref 135–145)
SODIUM SERPL-SCNC: 134 MMOL/L — LOW (ref 135–145)
SP GR SPEC: 1 — SIGNIFICANT CHANGE UP (ref 1–1.03)
THC UR QL: POSITIVE
UROBILINOGEN FLD QL: 0.2 MG/DL — SIGNIFICANT CHANGE UP (ref 0.2–1)
WBC # BLD: 9.79 K/UL — SIGNIFICANT CHANGE UP (ref 3.8–10.5)
WBC # FLD AUTO: 9.79 K/UL — SIGNIFICANT CHANGE UP (ref 3.8–10.5)

## 2024-03-18 PROCEDURE — 70450 CT HEAD/BRAIN W/O DYE: CPT | Mod: 26,MC

## 2024-03-18 PROCEDURE — G0508: CPT

## 2024-03-18 PROCEDURE — 71045 X-RAY EXAM CHEST 1 VIEW: CPT | Mod: 26

## 2024-03-18 PROCEDURE — 99291 CRITICAL CARE FIRST HOUR: CPT

## 2024-03-18 PROCEDURE — 93010 ELECTROCARDIOGRAM REPORT: CPT

## 2024-03-18 RX ORDER — FOLIC ACID 0.8 MG
1 TABLET ORAL DAILY
Refills: 0 | Status: DISCONTINUED | OUTPATIENT
Start: 2024-03-18 | End: 2024-03-22

## 2024-03-18 RX ORDER — THIAMINE MONONITRATE (VIT B1) 100 MG
200 TABLET ORAL DAILY
Refills: 0 | Status: DISCONTINUED | OUTPATIENT
Start: 2024-03-18 | End: 2024-03-19

## 2024-03-18 RX ORDER — SODIUM CHLORIDE 9 MG/ML
1000 INJECTION INTRAMUSCULAR; INTRAVENOUS; SUBCUTANEOUS ONCE
Refills: 0 | Status: COMPLETED | OUTPATIENT
Start: 2024-03-18 | End: 2024-03-18

## 2024-03-18 RX ORDER — CHLORHEXIDINE GLUCONATE 213 G/1000ML
1 SOLUTION TOPICAL
Refills: 0 | Status: DISCONTINUED | OUTPATIENT
Start: 2024-03-18 | End: 2024-03-22

## 2024-03-18 RX ORDER — PANTOPRAZOLE SODIUM 20 MG/1
40 TABLET, DELAYED RELEASE ORAL DAILY
Refills: 0 | Status: DISCONTINUED | OUTPATIENT
Start: 2024-03-18 | End: 2024-03-22

## 2024-03-18 RX ORDER — HEPARIN SODIUM 5000 [USP'U]/ML
5000 INJECTION INTRAVENOUS; SUBCUTANEOUS EVERY 8 HOURS
Refills: 0 | Status: DISCONTINUED | OUTPATIENT
Start: 2024-03-18 | End: 2024-03-23

## 2024-03-18 RX ADMIN — Medication 4 MILLIGRAM(S): at 21:12

## 2024-03-18 RX ADMIN — Medication 2 MILLIGRAM(S): at 17:11

## 2024-03-18 RX ADMIN — SODIUM CHLORIDE 1000 MILLILITER(S): 9 INJECTION INTRAMUSCULAR; INTRAVENOUS; SUBCUTANEOUS at 17:10

## 2024-03-18 RX ADMIN — HEPARIN SODIUM 5000 UNIT(S): 5000 INJECTION INTRAVENOUS; SUBCUTANEOUS at 21:09

## 2024-03-18 RX ADMIN — SODIUM CHLORIDE 1000 MILLILITER(S): 9 INJECTION INTRAMUSCULAR; INTRAVENOUS; SUBCUTANEOUS at 18:10

## 2024-03-18 NOTE — ED PROVIDER NOTE - CARE PLAN
1 Principal Discharge DX:	Alcohol withdrawal seizure   Principal Discharge DX:	Alcohol withdrawal seizure  Secondary Diagnosis:	Delirium tremens

## 2024-03-18 NOTE — PROVIDER CONTACT NOTE (EICU) - ASSESSMENT
s/p Ativan with some improvement but still displaying signs of withdrawal  High risk for decompensation given history.  s/p ketamine for agitation

## 2024-03-18 NOTE — ED ADULT NURSE NOTE - NSFALLHARMRISKINTERV_ED_ALL_ED
Assistance OOB with selected safe patient handling equipment if applicable/Communicate risk of Fall with Harm to all staff, patient, and family/Monitor for mental status changes and reorient to person, place, and time, as needed/Move patient closer to nursing station/within visual sight of ED staff/Provide patient with walking aids/Provide visual cue: red socks, yellow wristband, yellow gown, etc/Reinforce activity limits and safety measures with patient and family/Toileting schedule using arm’s reach rule for commode and bathroom/Use of alarms - bed, stretcher, chair and/or video monitoring/Bed in lowest position, wheels locked, appropriate side rails in place/Call bell, personal items and telephone in reach/Instruct patient to call for assistance before getting out of bed/chair/stretcher/Non-slip footwear applied when patient is off stretcher/[place] to call system/Physically safe environment - no spills, clutter or unnecessary equipment/Purposeful Proactive Rounding/Room/bathroom lighting operational, light cord in reach

## 2024-03-18 NOTE — ED PROVIDER NOTE - CLINICAL SUMMARY MEDICAL DECISION MAKING FREE TEXT BOX
53-year-old male brought in by EMS for multiple seizures today.  Patient has history of alcohol abuse and has been detoxing on his own.  He says his last drink was a few days ago.  According to EMS, they received a phone call earlier today and when they arrived he was adamantly refusing to come into the ED.  Girlfriend called 911 again this afternoon because patient had multiple seizures yet again.  This time, the patient was combative and not willing to come.  Ketamine given.  250 mg IM x 1.  Patient has multiple admissions for alcohol withdrawal in the past.  Most recent in late February 2024.  Exam as stated. Plan for benzos, labs, CIWA, monitor. LIkely admission. Prior charts reviewed. Pt with h/o very similar. 53-year-old male brought in by EMS for multiple seizures today.  Patient has history of alcohol abuse and has been detoxing on his own.  He says his last drink was a few days ago.  According to EMS, they received a phone call earlier today and when they arrived he was adamantly refusing to come into the ED.  Girlfriend called 911 again this afternoon because patient had multiple seizures yet again.  This time, the patient was combative and not willing to come.  Ketamine given.  250 mg IM x 1.  Patient has multiple admissions for alcohol withdrawal in the past.  Most recent in late February 2024.  Exam as stated. Plan for benzos, labs, CIWA, monitor. Admission to crit care unit. Prior charts reviewed. Pt with h/o very similar.    CTs reviewed. Pt given ativan due to delirium noted (unsure if DTs or Ketamine reaction). Patient BP and HR elevated. Results reviewed. Will contact ICU for admission given high risk and multiple repetitive seizures events plus delirium s/p etoh cessation. D/W EICU Dr Kasper and deemed appropriate. D/W ICU ISRA Henriquez 53-year-old male brought in by EMS for multiple seizures today.  Patient has history of alcohol abuse and has been detoxing on his own.  He says his last drink was a few days ago.  According to EMS, they received a phone call earlier today and when they arrived he was adamantly refusing to come into the ED.  Mayra called 911 again this afternoon because patient had multiple seizures yet again.  This time, the patient was combative and not willing to come.  Ketamine given.  250 mg IM x 1.  Patient has multiple admissions for alcohol withdrawal in the past.  Most recent in late February 2024.  Exam as stated. Plan for benzos, labs, CIWA, monitor. Admission to crit care unit. Prior charts reviewed. Pt with h/o very similar.    CTs reviewed. Pt given ativan due to delirium noted (unsure if DTs or Ketamine reaction). Patient BP and HR elevated. Results reviewed. Will contact ICU for admission given high risk and multiple repetitive seizures events plus delirium s/p etoh cessation. D/W EICU Dr Kasper and deemed appropriate. D/W ICU ISRA Henriquez    Girlfrienedward Sancheza came in.  Reports that patient had at least 3 seizures that she saw however 1 was a 'very big seizure' and that he was foaming at the mouth, bit his tongue, thrashing about.

## 2024-03-18 NOTE — H&P ADULT - NSHPPHYSICALEXAM_GEN_ALL_CORE
T(C): 36.1 (03-18-24 @ 16:46), Max: 36.1 (03-18-24 @ 16:46)  HR: 117 (03-18-24 @ 16:46) (117 - 117)  BP: 188/120 (03-18-24 @ 16:46) (188/120 - 188/120)  RR: 20 (03-18-24 @ 16:46) (20 - 20)  SpO2: 100% (03-18-24 @ 16:46) (100% - 100%)    CONSTITUTIONAL: Well groomed, no apparent distress  EYES: PERRL and symmetric, EOMI0  NECK: Supple, symmetric and without tracheal deviation   RESP: No respiratory distress, no use of accessory muscles; CTA b/l  CV: RRR, +S1S2 noted  GI: Soft, NT, ND, no rebound, no guarding  LYMPH: No cervical LAD or tenderness; no axillary LAD or tenderness; no inguinal LAD or tenderness  MSK: MOLINA x4 equally, normal ROM  SKIN: No rashes or ulcers noted; no subcutaneous nodules or induration palpable  NEURO: CN II-XII intact; normal reflexes in upper and lower extremities, sensation intact in upper and lower extremities b/l to light touch   PSYCH: IDRIS at this time

## 2024-03-18 NOTE — ED ADULT NURSE NOTE - OBJECTIVE STATEMENT
Patient brought in by EMS from home with complaint of seizures x3. Per patient partner, patient was self detoxification from ETOH. Patient noted to be combative in route and was given 250mg of Ketamine. Patient noted to be confused and asking where he is.

## 2024-03-18 NOTE — ED ADULT TRIAGE NOTE - CHIEF COMPLAINT QUOTE
Pt BIBEMS for seizures x3 PTA. Per partner is "self-detoxing" from ETOH. Combative en route, received 250mg ketamine PTA. .

## 2024-03-18 NOTE — ED PROVIDER NOTE - PHYSICAL EXAMINATION
General:     NAD, staring around - able to answer simple questions but staring around saying he feels weird. He feels disoriented.   Patient able to tell me he lives with his girl, Migdalia. He states his last drink was a few days ago. He has no pain currently. He knows that he has had h/o seizures but doesn't know that he reportedly had multiple today. H/O ETOH abuse.   Eyes: PERRL, white sclera  Head:     NC/AT, EOMI  Pharynx: pharynx wnl, oral mucosa moist  Neck:     trachea midline  Lungs:     CTA b/l  CVS:     tachycardia   Abd:     +BS, s/nt/nd  Ext:   no deformities or edema.   Skin: no rash  Neuro: Awake and alert, oriented to place and person, no sensory/motor deficits. General:     NAD, staring around - able to answer simple questions but staring around saying he feels weird. He feels disoriented.   Patient able to tell me he lives with his girl, Migdalia. He states his last drink was a few days ago. He has no pain currently. He knows that he has had h/o seizures but doesn't know that he reportedly had multiple today. H/O ETOH abuse.   Eyes: PERRL, white sclera  Head:     NC/AT, EOMI  Pharynx: pharynx wnl, oral mucosa moist, tongue bite noted  Neck:     trachea midline  Lungs:     CTA b/l  CVS:     tachycardia   Abd:     +BS, s/nt/nd  Ext:   no deformities or edema.   Skin: no rash  Neuro: Awake and alert, oriented to place and person, no sensory/motor deficits.

## 2024-03-18 NOTE — PROVIDER CONTACT NOTE (EICU) - BACKGROUND
53 year old male with a PMH of ETOH abuse s/p multiple hospitalizations (last Feb 2024) for withdrawal prior who has been "detoxing" on his own p/w withdrawal seizures. Prior extensive hx of withdrawal seizures. no trauma history.

## 2024-03-18 NOTE — ED PROVIDER NOTE - OBJECTIVE STATEMENT
53-year-old male brought in by EMS for multiple seizures today.  Patient has history of alcohol abuse and has been detoxing on his own.  He says his last drink was a few days ago.  According to EMS, they received a phone call earlier today and when they arrived he was adamantly refusing to come into the ED.  Girlfriend called 911 again this afternoon because patient had multiple seizures yet again.  This time, the patient was combative and not willing to come.  Ketamine given.  250 mg IM x 1.  Patient has multiple admissions for alcohol withdrawal in the past.  Most recent in late February 2024.

## 2024-03-18 NOTE — H&P ADULT - HISTORY OF PRESENT ILLNESS
Mr. Ordaz is a 53 year old male with a PMH of ETOH abuse s/p multiple hospitalizations (last Feb 2024) for withdrawal prior who has been "detoxing" on his own with last drink a few days ago. Today pt gf called EMS for seizure activity, however pt initially refused treatment and they left. He continued to have multiple seizures so she called again and pt was given Ketamine IM en route for sedation. Pt noted to be delirious in ED. ROS otherwise negative. ICU consulted.

## 2024-03-18 NOTE — H&P ADULT - ASSESSMENT
53 year old male with a PMH of ETOH abuse s/p multiple hospitalizations (last Feb 2024) admitted to ICU for tx of:    ETOH withdrawal with seizures  Delirium drug induced (ketamine) vs DT's  ?sinusitis      Plan  Admit to ICU, monitor vitals per policy  CIWA protocol, seizure precautions  Ativan IVP for now, assess pt response; etco2 monitoring  pt HDS and without respiratory distress  NPO, monitor blood glucose levels  Labs  i/o's  sqh for dvt prophylaxis  ppi for gi prophylaxis  full code

## 2024-03-18 NOTE — ED ADULT NURSE REASSESSMENT NOTE - NS ED NURSE REASSESS COMMENT FT1
MD mendieta at bedside speaking to pts wife on plan of care to send pt to ICU for multiple siezures today due to ETOH withdrawal. will continue to monitor.

## 2024-03-19 LAB
ALBUMIN SERPL ELPH-MCNC: 3.9 G/DL — SIGNIFICANT CHANGE UP (ref 3.3–5)
ALP SERPL-CCNC: 65 U/L — SIGNIFICANT CHANGE UP (ref 40–120)
ALT FLD-CCNC: 50 U/L — HIGH (ref 10–45)
ANION GAP SERPL CALC-SCNC: 11 MMOL/L — SIGNIFICANT CHANGE UP (ref 5–17)
ANION GAP SERPL CALC-SCNC: 8 MMOL/L — SIGNIFICANT CHANGE UP (ref 5–17)
AST SERPL-CCNC: 62 U/L — HIGH (ref 10–40)
BILIRUB SERPL-MCNC: 1 MG/DL — SIGNIFICANT CHANGE UP (ref 0.2–1.2)
BUN SERPL-MCNC: 11 MG/DL — SIGNIFICANT CHANGE UP (ref 7–23)
BUN SERPL-MCNC: 15 MG/DL — SIGNIFICANT CHANGE UP (ref 7–23)
CALCIUM SERPL-MCNC: 9 MG/DL — SIGNIFICANT CHANGE UP (ref 8.4–10.5)
CALCIUM SERPL-MCNC: 9.5 MG/DL — SIGNIFICANT CHANGE UP (ref 8.4–10.5)
CHLORIDE SERPL-SCNC: 102 MMOL/L — SIGNIFICANT CHANGE UP (ref 96–108)
CHLORIDE SERPL-SCNC: 98 MMOL/L — SIGNIFICANT CHANGE UP (ref 96–108)
CO2 SERPL-SCNC: 25 MMOL/L — SIGNIFICANT CHANGE UP (ref 22–31)
CO2 SERPL-SCNC: 30 MMOL/L — SIGNIFICANT CHANGE UP (ref 22–31)
CREAT SERPL-MCNC: 0.84 MG/DL — SIGNIFICANT CHANGE UP (ref 0.5–1.3)
CREAT SERPL-MCNC: 0.88 MG/DL — SIGNIFICANT CHANGE UP (ref 0.5–1.3)
EGFR: 104 ML/MIN/1.73M2 — SIGNIFICANT CHANGE UP
EGFR: 105 ML/MIN/1.73M2 — SIGNIFICANT CHANGE UP
GLUCOSE SERPL-MCNC: 113 MG/DL — HIGH (ref 70–99)
GLUCOSE SERPL-MCNC: 84 MG/DL — SIGNIFICANT CHANGE UP (ref 70–99)
HCT VFR BLD CALC: 38.5 % — LOW (ref 39–50)
HGB BLD-MCNC: 13.1 G/DL — SIGNIFICANT CHANGE UP (ref 13–17)
MAGNESIUM SERPL-MCNC: 1.9 MG/DL — SIGNIFICANT CHANGE UP (ref 1.6–2.6)
MCHC RBC-ENTMCNC: 34 GM/DL — SIGNIFICANT CHANGE UP (ref 32–36)
MCHC RBC-ENTMCNC: 34.7 PG — HIGH (ref 27–34)
MCV RBC AUTO: 102.1 FL — HIGH (ref 80–100)
NRBC # BLD: 0 /100 WBCS — SIGNIFICANT CHANGE UP (ref 0–0)
PHOSPHATE SERPL-MCNC: 2.1 MG/DL — LOW (ref 2.5–4.5)
PLATELET # BLD AUTO: 104 K/UL — LOW (ref 150–400)
POTASSIUM SERPL-MCNC: 3 MMOL/L — LOW (ref 3.5–5.3)
POTASSIUM SERPL-MCNC: 4.1 MMOL/L — SIGNIFICANT CHANGE UP (ref 3.5–5.3)
POTASSIUM SERPL-SCNC: 3 MMOL/L — LOW (ref 3.5–5.3)
POTASSIUM SERPL-SCNC: 4.1 MMOL/L — SIGNIFICANT CHANGE UP (ref 3.5–5.3)
PROT SERPL-MCNC: 7.3 G/DL — SIGNIFICANT CHANGE UP (ref 6–8.3)
RBC # BLD: 3.77 M/UL — LOW (ref 4.2–5.8)
RBC # FLD: 11.8 % — SIGNIFICANT CHANGE UP (ref 10.3–14.5)
SODIUM SERPL-SCNC: 135 MMOL/L — SIGNIFICANT CHANGE UP (ref 135–145)
SODIUM SERPL-SCNC: 139 MMOL/L — SIGNIFICANT CHANGE UP (ref 135–145)
WBC # BLD: 6.01 K/UL — SIGNIFICANT CHANGE UP (ref 3.8–10.5)
WBC # FLD AUTO: 6.01 K/UL — SIGNIFICANT CHANGE UP (ref 3.8–10.5)

## 2024-03-19 RX ORDER — INFLUENZA VIRUS VACCINE 15; 15; 15; 15 UG/.5ML; UG/.5ML; UG/.5ML; UG/.5ML
0.5 SUSPENSION INTRAMUSCULAR ONCE
Refills: 0 | Status: DISCONTINUED | OUTPATIENT
Start: 2024-03-19 | End: 2024-03-23

## 2024-03-19 RX ORDER — POTASSIUM CHLORIDE 20 MEQ
40 PACKET (EA) ORAL EVERY 4 HOURS
Refills: 0 | Status: COMPLETED | OUTPATIENT
Start: 2024-03-19 | End: 2024-03-19

## 2024-03-19 RX ORDER — PHENOBARBITAL 60 MG
65 TABLET ORAL ONCE
Refills: 0 | Status: DISCONTINUED | OUTPATIENT
Start: 2024-03-19 | End: 2024-03-19

## 2024-03-19 RX ORDER — NICOTINE POLACRILEX 2 MG
4 GUM BUCCAL ONCE
Refills: 0 | Status: COMPLETED | OUTPATIENT
Start: 2024-03-19 | End: 2024-03-19

## 2024-03-19 RX ORDER — THIAMINE MONONITRATE (VIT B1) 100 MG
500 TABLET ORAL EVERY 8 HOURS
Refills: 0 | Status: COMPLETED | OUTPATIENT
Start: 2024-03-19 | End: 2024-03-22

## 2024-03-19 RX ADMIN — Medication 65 MILLIGRAM(S): at 23:50

## 2024-03-19 RX ADMIN — Medication 62.5 MILLIMOLE(S): at 23:40

## 2024-03-19 RX ADMIN — Medication 40 MILLIEQUIVALENT(S): at 10:59

## 2024-03-19 RX ADMIN — Medication 4 MILLIGRAM(S): at 21:35

## 2024-03-19 RX ADMIN — Medication 4 MILLIGRAM(S): at 13:13

## 2024-03-19 RX ADMIN — Medication 4 MILLIGRAM(S): at 01:14

## 2024-03-19 RX ADMIN — Medication 4 MILLIGRAM(S): at 10:57

## 2024-03-19 RX ADMIN — HEPARIN SODIUM 5000 UNIT(S): 5000 INJECTION INTRAVENOUS; SUBCUTANEOUS at 13:14

## 2024-03-19 RX ADMIN — Medication 4 MILLIGRAM(S): at 22:26

## 2024-03-19 RX ADMIN — Medication 105 MILLIGRAM(S): at 22:26

## 2024-03-19 RX ADMIN — Medication 1 MILLIGRAM(S): at 16:06

## 2024-03-19 RX ADMIN — Medication 4 MILLIGRAM(S): at 18:14

## 2024-03-19 RX ADMIN — Medication 1 TABLET(S): at 13:13

## 2024-03-19 RX ADMIN — CHLORHEXIDINE GLUCONATE 1 APPLICATION(S): 213 SOLUTION TOPICAL at 05:17

## 2024-03-19 RX ADMIN — HEPARIN SODIUM 5000 UNIT(S): 5000 INJECTION INTRAVENOUS; SUBCUTANEOUS at 05:17

## 2024-03-19 RX ADMIN — Medication 4 MILLIGRAM(S): at 16:05

## 2024-03-19 RX ADMIN — PANTOPRAZOLE SODIUM 40 MILLIGRAM(S): 20 TABLET, DELAYED RELEASE ORAL at 13:13

## 2024-03-19 RX ADMIN — Medication 4 MILLIGRAM(S): at 05:17

## 2024-03-19 RX ADMIN — Medication 4 MILLIGRAM(S): at 22:57

## 2024-03-19 RX ADMIN — Medication 40 MILLIEQUIVALENT(S): at 13:14

## 2024-03-19 NOTE — PATIENT PROFILE ADULT - FUNCTIONAL ASSESSMENT - DAILY ACTIVITY 3.
HOW TO USE AEROCHAMBER WITH A MASK    1) shake the albuterol  2) place in end of aerochamber  3) Place on face with a good seal  4) Push MDI (Metered Dose Inhaler or puffer)  5) let child take 6-7 good breaths while MDI is on face  6) take down; wait  and repeat x1     Rx:  2 puffs every 4 hrs for 2 days; then 2 puffs 3x a day till cold resolved or till follow-up with MD.    
4 = No assist / stand by assistance

## 2024-03-19 NOTE — PROGRESS NOTE ADULT - SUBJECTIVE AND OBJECTIVE BOX
F/U Note:    53y Male admitted with EtOH withdrawal    Interval Hx:  -calm now, CIWA 5 or less  -AM labs with K+ of 3 was given 80 MEQs of KCL    Vital Signs Last 24 Hrs  T(C): 36.8 (19 Mar 2024 05:00), Max: 37.2 (19 Mar 2024 00:06)  T(F): 98.3 (19 Mar 2024 05:00), Max: 99 (19 Mar 2024 00:06)  HR: 88 (19 Mar 2024 19:00) (77 - 105)  BP: 117/86 (19 Mar 2024 19:00) (98/82 - 145/92)  BP(mean): 97 (19 Mar 2024 19:00) (83 - 103)  RR: 16 (19 Mar 2024 19:00) (12 - 20)  SpO2: 95% (19 Mar 2024 19:00) (95% - 100%)    Parameters below as of 19 Mar 2024 00:06  Patient On (Oxygen Delivery Method): nasal cannula  O2 Flow (L/min): 4                              13.1   6.01  )-----------( 104      ( 19 Mar 2024 05:00 )             38.5         03-19    139  |  98  |  11  ----------------------------<  84  3.0<L>   |  30  |  0.88    Ca    9.5      19 Mar 2024 05:00  Phos  2.6     03-18  Mg     1.9     03-18    TPro  7.3  /  Alb  3.9  /  TBili  1.0  /  DBili  x   /  AST  62<H>  /  ALT  50<H>  /  AlkPhos  65  03-19        NEURO: no headaches, blurry vision, tremors, depression, anxity  CV: no chest pain, palpitations, murmurs, orthopnea  Resp: no shortness of breath, cough, wheeze, sputum production  GI: no stomach pain,nausea, vomitting, flatulence, hematemesis, hematochezia  PV: no swelling of extremities, no hair loss, no coolness to extremities  ENDO: no polydypsia, polyphagia, polyuria, weight loss, night sweats         NEURO: awake and alert  CV: (+) S1/S2, rrr, no mrg  RESP: CTA b/l  GI: soft, non tender

## 2024-03-19 NOTE — PATIENT PROFILE ADULT - FALL HARM RISK - HARM RISK INTERVENTIONS

## 2024-03-19 NOTE — PROGRESS NOTE ADULT - ASSESSMENT
53 year old male with a PMH of ETOH abuse s/p multiple hospitalizations (last Feb 2024) admitted to ICU for tx of:    Assessment   1. ETOH withdrawal with seizures  2. Hyperactive Delirium drug induced (ketamine) vs DT's    Plan  CIWA protocol, seizure precautions  Ativan IVP for now, assess pt response; etco2 monitoring  Tapering Ativan dose  pt HDS and without respiratory distress  Oral diet   Thiamine and folate supplementation   sqh for dvt prophylaxis  ppi for gi prophylaxis  full code Greater than 95%

## 2024-03-19 NOTE — PROGRESS NOTE ADULT - SUBJECTIVE AND OBJECTIVE BOX
Follow-up Critical Care Progress Note  Chief Complaint : Alcohol use with withdrawal    No new events overnight.  Denies SOB/CP. Mild tremulousness     Allergies :fish (Unknown)  penicillin (Unknown)  shellfish (Unknown)    PAST MEDICAL & SURGICAL HISTORY:  Alcohol abuse    No significant past surgical history      Medications:  MEDICATIONS  (STANDING):  chlorhexidine 2% Cloths 1 Application(s) Topical <User Schedule>  folic acid Injectable 1 milliGRAM(s) IV Push daily  heparin   Injectable 5000 Unit(s) SubCutaneous every 8 hours  influenza   Vaccine 0.5 milliLiter(s) IntraMuscular once  LORazepam   Injectable 4 milliGRAM(s) IV Push every 4 hours  LORazepam   Injectable   IV Push   multivitamin 1 Tablet(s) Oral daily  pantoprazole  Injectable 40 milliGRAM(s) IV Push daily  thiamine Injectable 200 milliGRAM(s) IV Push daily    MEDICATIONS  (PRN):  LORazepam   Injectable 4 milliGRAM(s) IV Push every 1 hour PRN Symptom-triggered: each CIWA -Ar score 8 or GREATER      Antibiotics History      Heme Medications   heparin   Injectable 5000 Unit(s) SubCutaneous every 8 hours, 03-18-24 @ 18:59      GI Medications  pantoprazole  Injectable 40 milliGRAM(s) IV Push daily, 03-18-24 @ 19:00, Routine        LABS:                        13.1   6.01  )-----------( 104      ( 19 Mar 2024 05:00 )             38.5     03-19    139  |  98  |  11  ----------------------------<  84  3.0<L>   |  30  |  0.88    Ca    9.5      19 Mar 2024 05:00  Phos  2.6     03-18  Mg     1.9     03-18    TPro  7.3  /  Alb  3.9  /  TBili  1.0  /  DBili  x   /  AST  62<H>  /  ALT  50<H>  /  AlkPhos  65  03-19    Urinalysis Basic - ( 19 Mar 2024 05:00 )    Color: x / Appearance: x / SG: x / pH: x  Gluc: 84 mg/dL / Ketone: x  / Bili: x / Urobili: x   Blood: x / Protein: x / Nitrite: x   Leuk Esterase: x / RBC: x / WBC x   Sq Epi: x / Non Sq Epi: x / Bacteria: x    VITALS:  T(C): 36.8 (03-19-24 @ 05:00), Max: 37.2 (03-19-24 @ 00:06)  T(F): 98.3 (03-19-24 @ 05:00), Max: 99 (03-19-24 @ 00:06)  HR: 85 (03-19-24 @ 12:00) (77 - 117)  BP: 113/80 (03-19-24 @ 12:00) (98/82 - 188/120)  BP(mean): 90 (03-19-24 @ 12:00) (83 - 111)  ABP: --  ABP(mean): --  RR: 12 (03-19-24 @ 12:00) (12 - 20)  SpO2: 95% (03-19-24 @ 12:00) (95% - 100%)  CVP(mm Hg): --  CVP(cm H2O): --    Ins and Outs       Height (cm): 177.8 (03-19-24 @ 00:06)  Weight (kg): 63.5 (03-19-24 @ 05:00)  BMI (kg/m2): 20.1 (03-19-24 @ 05:00)        I&O's Detail      Physical Examination:  GENERAL:               Alert, Oriented, No acute distress.    HEENT:                    Pupils equal, reactive to light.  Symmetric. No JVD, Moist MM  PULM:                     Bilateral air entry, No Rales, No Rhonchi, No Wheezing  CVS:                         S1, S2,  No Murmur  ABD:                        Soft, nondistended, nontender, normoactive bowel sounds,   EXT:                         No edema, nontender, No Cyanosis or Clubbing   Vascular:                Warm Extremities, Normal Capillary refill, Normal Distal Pulses  SKIN:                       Warm and well perfused, no rashes noted.   NEURO:                  Alert, oriented, interactive, nonfocal, follows commands, Mild tremulousness   PSYC:                      Calm, + Insight.

## 2024-03-19 NOTE — PROGRESS NOTE ADULT - ASSESSMENT
PLAN:        -follow cIWA  -on benzo taper, PRN IVP if breakthrough withdrawal  -daily thiamine, folic acid, and MV  -follow lytes, wsa given KCL will repeat BMP, amg,phos tonight to see if further repletion needed  -DVT prophylaxis  -AM labs           TIME SPENT:  35 minutes of  time spent providing medical care for patient's acute illness/conditions that impairs at least one vital organ system and/or poses a high risk of imminent or life threatening deterioration in the patient's condition. It includes time spent evaluating and treating the patient's acute illness as well as time spent reviewing labs, radiology, discussing goals of care with patient and/or patient's family, and discussing the case with a multidisciplinary team, including the eICU, in an effort to prevent further life threatening deterioration or end organ damage. This time is independent of any procedures performed.    DATE OF ENTRY OF THIS NOTE IS EQUAL TO THE DATE OF SERVICES RENDERED

## 2024-03-20 LAB
ANION GAP SERPL CALC-SCNC: 12 MMOL/L — SIGNIFICANT CHANGE UP (ref 5–17)
BUN SERPL-MCNC: 14 MG/DL — SIGNIFICANT CHANGE UP (ref 7–23)
CALCIUM SERPL-MCNC: 8.4 MG/DL — SIGNIFICANT CHANGE UP (ref 8.4–10.5)
CHLORIDE SERPL-SCNC: 100 MMOL/L — SIGNIFICANT CHANGE UP (ref 96–108)
CO2 SERPL-SCNC: 26 MMOL/L — SIGNIFICANT CHANGE UP (ref 22–31)
CREAT SERPL-MCNC: 0.59 MG/DL — SIGNIFICANT CHANGE UP (ref 0.5–1.3)
EGFR: 117 ML/MIN/1.73M2 — SIGNIFICANT CHANGE UP
GLUCOSE SERPL-MCNC: 128 MG/DL — HIGH (ref 70–99)
HCT VFR BLD CALC: 36.1 % — LOW (ref 39–50)
HGB BLD-MCNC: 12.2 G/DL — LOW (ref 13–17)
MAGNESIUM SERPL-MCNC: 1.9 MG/DL — SIGNIFICANT CHANGE UP (ref 1.6–2.6)
MCHC RBC-ENTMCNC: 33.8 GM/DL — SIGNIFICANT CHANGE UP (ref 32–36)
MCHC RBC-ENTMCNC: 35.5 PG — HIGH (ref 27–34)
MCV RBC AUTO: 104.9 FL — HIGH (ref 80–100)
NRBC # BLD: 0 /100 WBCS — SIGNIFICANT CHANGE UP (ref 0–0)
PHOSPHATE SERPL-MCNC: 4.2 MG/DL — SIGNIFICANT CHANGE UP (ref 2.5–4.5)
PLATELET # BLD AUTO: 102 K/UL — LOW (ref 150–400)
POTASSIUM SERPL-MCNC: 3.3 MMOL/L — LOW (ref 3.5–5.3)
POTASSIUM SERPL-SCNC: 3.3 MMOL/L — LOW (ref 3.5–5.3)
RBC # BLD: 3.44 M/UL — LOW (ref 4.2–5.8)
RBC # FLD: 11.8 % — SIGNIFICANT CHANGE UP (ref 10.3–14.5)
SODIUM SERPL-SCNC: 138 MMOL/L — SIGNIFICANT CHANGE UP (ref 135–145)
WBC # BLD: 4.78 K/UL — SIGNIFICANT CHANGE UP (ref 3.8–10.5)
WBC # FLD AUTO: 4.78 K/UL — SIGNIFICANT CHANGE UP (ref 3.8–10.5)

## 2024-03-20 RX ORDER — POTASSIUM CHLORIDE 20 MEQ
10 PACKET (EA) ORAL
Refills: 0 | Status: COMPLETED | OUTPATIENT
Start: 2024-03-20 | End: 2024-03-20

## 2024-03-20 RX ORDER — PHENOBARBITAL 60 MG
130 TABLET ORAL ONCE
Refills: 0 | Status: DISCONTINUED | OUTPATIENT
Start: 2024-03-20 | End: 2024-03-20

## 2024-03-20 RX ORDER — DEXMEDETOMIDINE HYDROCHLORIDE IN 0.9% SODIUM CHLORIDE 4 UG/ML
0.5 INJECTION INTRAVENOUS
Qty: 400 | Refills: 0 | Status: DISCONTINUED | OUTPATIENT
Start: 2024-03-20 | End: 2024-03-22

## 2024-03-20 RX ADMIN — Medication 3 MILLIGRAM(S): at 18:08

## 2024-03-20 RX ADMIN — Medication 1 MILLIGRAM(S): at 11:46

## 2024-03-20 RX ADMIN — Medication 105 MILLIGRAM(S): at 22:37

## 2024-03-20 RX ADMIN — Medication 100 MILLIEQUIVALENT(S): at 08:18

## 2024-03-20 RX ADMIN — PANTOPRAZOLE SODIUM 40 MILLIGRAM(S): 20 TABLET, DELAYED RELEASE ORAL at 11:29

## 2024-03-20 RX ADMIN — Medication 105 MILLIGRAM(S): at 13:54

## 2024-03-20 RX ADMIN — Medication 3 MILLIGRAM(S): at 21:11

## 2024-03-20 RX ADMIN — HEPARIN SODIUM 5000 UNIT(S): 5000 INJECTION INTRAVENOUS; SUBCUTANEOUS at 13:11

## 2024-03-20 RX ADMIN — Medication 130 MILLIGRAM(S): at 01:44

## 2024-03-20 RX ADMIN — Medication 105 MILLIGRAM(S): at 06:05

## 2024-03-20 RX ADMIN — Medication 100 MILLIEQUIVALENT(S): at 11:30

## 2024-03-20 RX ADMIN — HEPARIN SODIUM 5000 UNIT(S): 5000 INJECTION INTRAVENOUS; SUBCUTANEOUS at 22:38

## 2024-03-20 RX ADMIN — DEXMEDETOMIDINE HYDROCHLORIDE IN 0.9% SODIUM CHLORIDE 7.94 MICROGRAM(S)/KG/HR: 4 INJECTION INTRAVENOUS at 22:09

## 2024-03-20 RX ADMIN — Medication 4 MILLIGRAM(S): at 22:39

## 2024-03-20 RX ADMIN — Medication 100 MILLIEQUIVALENT(S): at 10:27

## 2024-03-20 RX ADMIN — DEXMEDETOMIDINE HYDROCHLORIDE IN 0.9% SODIUM CHLORIDE 7.94 MICROGRAM(S)/KG/HR: 4 INJECTION INTRAVENOUS at 16:25

## 2024-03-20 NOTE — DIETITIAN INITIAL EVALUATION ADULT - PERTINENT LABORATORY DATA
03-20    138  |  100  |  14  ----------------------------<  128<H>  3.3<L>   |  26  |  0.59    Ca    8.4      20 Mar 2024 06:00  Phos  4.2     03-20  Mg     1.9     03-20    TPro  7.3  /  Alb  3.9  /  TBili  1.0  /  DBili  x   /  AST  62<H>  /  ALT  50<H>  /  AlkPhos  65  03-19  A1C with Estimated Average Glucose Result: 4.8 % (01-24-24 @ 06:41)

## 2024-03-20 NOTE — PROGRESS NOTE ADULT - SUBJECTIVE AND OBJECTIVE BOX
Follow-up Critical Care Progress Note  Chief Complaint : Alcohol use with withdrawal    No new events overnight.  Denies SOB/CP. Mild tremulousness     Allergies :fish (Unknown)  penicillin (Unknown)  shellfish (Unknown)    PAST MEDICAL & SURGICAL HISTORY:  Alcohol abuse    No significant past surgical history      Medications:  MEDICATIONS  (STANDING):  chlorhexidine 2% Cloths 1 Application(s) Topical <User Schedule>  folic acid Injectable 1 milliGRAM(s) IV Push daily  heparin   Injectable 5000 Unit(s) SubCutaneous every 8 hours  influenza   Vaccine 0.5 milliLiter(s) IntraMuscular once  LORazepam   Injectable 4 milliGRAM(s) IV Push every 4 hours  LORazepam   Injectable   IV Push   multivitamin 1 Tablet(s) Oral daily  pantoprazole  Injectable 40 milliGRAM(s) IV Push daily  thiamine Injectable 200 milliGRAM(s) IV Push daily    MEDICATIONS  (PRN):  LORazepam   Injectable 4 milliGRAM(s) IV Push every 1 hour PRN Symptom-triggered: each CIWA -Ar score 8 or GREATER      Antibiotics History      Heme Medications   heparin   Injectable 5000 Unit(s) SubCutaneous every 8 hours, 03-18-24 @ 18:59      GI Medications  pantoprazole  Injectable 40 milliGRAM(s) IV Push daily, 03-18-24 @ 19:00, Routine        LABS:                        13.1   6.01  )-----------( 104      ( 19 Mar 2024 05:00 )             38.5     03-19    139  |  98  |  11  ----------------------------<  84  3.0<L>   |  30  |  0.88    Ca    9.5      19 Mar 2024 05:00  Phos  2.6     03-18  Mg     1.9     03-18    TPro  7.3  /  Alb  3.9  /  TBili  1.0  /  DBili  x   /  AST  62<H>  /  ALT  50<H>  /  AlkPhos  65  03-19    Urinalysis Basic - ( 19 Mar 2024 05:00 )    Color: x / Appearance: x / SG: x / pH: x  Gluc: 84 mg/dL / Ketone: x  / Bili: x / Urobili: x   Blood: x / Protein: x / Nitrite: x   Leuk Esterase: x / RBC: x / WBC x   Sq Epi: x / Non Sq Epi: x / Bacteria: x    VITALS:  T(C): 36.8 (03-19-24 @ 05:00), Max: 37.2 (03-19-24 @ 00:06)  T(F): 98.3 (03-19-24 @ 05:00), Max: 99 (03-19-24 @ 00:06)  HR: 85 (03-19-24 @ 12:00) (77 - 117)  BP: 113/80 (03-19-24 @ 12:00) (98/82 - 188/120)  BP(mean): 90 (03-19-24 @ 12:00) (83 - 111)  ABP: --  ABP(mean): --  RR: 12 (03-19-24 @ 12:00) (12 - 20)  SpO2: 95% (03-19-24 @ 12:00) (95% - 100%)  CVP(mm Hg): --  CVP(cm H2O): --    Ins and Outs       Height (cm): 177.8 (03-19-24 @ 00:06)  Weight (kg): 63.5 (03-19-24 @ 05:00)  BMI (kg/m2): 20.1 (03-19-24 @ 05:00)        I&O's Detail      Physical Examination:  GENERAL:               Alert, Oriented, No acute distress.    HEENT:                    Pupils equal, reactive to light.  Symmetric. No JVD, Moist MM  PULM:                     Bilateral air entry, No Rales, No Rhonchi, No Wheezing  CVS:                         S1, S2,  No Murmur  ABD:                        Soft, nondistended, nontender, normoactive bowel sounds,   EXT:                         No edema, nontender, No Cyanosis or Clubbing   Vascular:                Warm Extremities, Normal Capillary refill, Normal Distal Pulses  SKIN:                       Warm and well perfused, no rashes noted.   NEURO:                  Alert, oriented, interactive, nonfocal, follows commands, Mild tremulousness   PSYC:                      Calm, + Insight.             Follow-up Critical Care Progress Note  Chief Complaint : Alcohol use with withdrawal    No new events overnight sedated on precedex drip.     Allergies :fish (Unknown)  penicillin (Unknown)  shellfish (Unknown)    PAST MEDICAL & SURGICAL HISTORY:  Alcohol abuse    No significant past surgical history    MEDICATIONS  (STANDING):  chlorhexidine 2% Cloths 1 Application(s) Topical <User Schedule>  dexMEDEtomidine Infusion 0.5 MICROgram(s)/kG/Hr (7.94 mL/Hr) IV Continuous <Continuous>  folic acid Injectable 1 milliGRAM(s) IV Push daily  heparin   Injectable 5000 Unit(s) SubCutaneous every 8 hours  influenza   Vaccine 0.5 milliLiter(s) IntraMuscular once  LORazepam   Injectable 3 milliGRAM(s) IV Push every 4 hours  LORazepam   Injectable   IV Push   multivitamin 1 Tablet(s) Oral daily  pantoprazole  Injectable 40 milliGRAM(s) IV Push daily  potassium chloride  10 mEq/100 mL IVPB 10 milliEquivalent(s) IV Intermittent every 1 hour  thiamine IVPB 500 milliGRAM(s) IV Intermittent every 8 hours    MEDICATIONS  (PRN):  LORazepam   Injectable 4 milliGRAM(s) IV Push every 1 hour PRN Symptom-triggered: each CIWA -Ar score 8 or GREATER    Heme Medications   heparin   Injectable 5000 Unit(s) SubCutaneous every 8 hours, 03-18-24 @ 18:59    GI Medications  pantoprazole  Injectable 40 milliGRAM(s) IV Push daily, 03-18-24 @ 19:00, Routine      LABS:                        12.2   4.78  )-----------( 102      ( 20 Mar 2024 06:00 )             36.1   03-20    138  |  100  |  14  ----------------------------<  128<H>  3.3<L>   |  26  |  0.59    Ca    8.4      20 Mar 2024 06:00  Phos  4.2     03-20  Mg     1.9     03-20    TPro  7.3  /  Alb  3.9  /  TBili  1.0  /  DBili  x   /  AST  62<H>  /  ALT  50<H>  /  AlkPhos  65  03-19    COVID  01-23-24 @ 23:00  COVID -   NotDetec  02-05-22 @ 15:26  COVID -   NotDetec    WBC Trend  03-20-24 @ 06:00   -  4.78  03-19-24 @ 05:00   -  6.01  03-18-24 @ 17:05   -  9.79    H/H Trend  03-20-24 @ 06:00   -   12.2<L>/ 36.1<L>  03-19-24 @ 05:00   -   13.1/ 38.5<L>  03-18-24 @ 17:05   -   13.4/ 37.3<L>  02-27-24 @ 06:31   -   12.5<L>/ 36.3<L>  02-26-24 @ 13:00   -   14.1/ 39.3    Stool Occult Blood    Platelet Trend  03-20-24 @ 06:00   -  102<L>  03-19-24 @ 05:00   -  104<L>  03-18-24 @ 17:05   -  124<L>    Trend Sodium  03-20-24 @ 06:00   -  138  03-19-24 @ 20:43   -  135  03-19-24 @ 05:00   -  139  03-18-24 @ 22:00   -  134<L>  03-18-24 @ 17:05   -  133<L>    Trend Potassium  03-20-24 @ 06:00   -  3.3<L>  03-19-24 @ 20:43   -  4.1  03-19-24 @ 05:00   -  3.0<L>  03-18-24 @ 22:00   -  3.4<L>  03-18-24 @ 17:05   -  3.4<L>    Trend Bun/Cr  03-20-24 @ 06:00  BUN/CR -  14 / 0.59  03-19-24 @ 20:43  BUN/CR -  15 / 0.84  03-19-24 @ 05:00  BUN/CR -  11 / 0.88  03-18-24 @ 22:00  BUN/CR -  9 / 0.85  03-18-24 @ 17:05  BUN/CR -  11 / 0.96    Lactic Acid Trend    ABG Trend  12-12-23 @ 16:20   - 7.48<H>/36/92/98.8<H>    Trend AST/ALT/ALK Phos/Bili  03-19-24 @ 05:00   62<H>/50<H>/65/1.0  03-18-24 @ 22:00   62<H>/46<H>/58/0.8  03-18-24 @ 17:05   66<H>/53<H>/72/0.8  02-27-24 @ 06:31   114<H>/97<H>/58/0.7  02-26-24 @ 13:45   144<H>/110<H>/59/0.5  01-24-24 @ 06:41   206<H>/217<H>/62/0.8  01-23-24 @ 19:59   266<H>/247<H>/63/0.5  12-14-23 @ 06:04   68<H>/71<H>/53/0.8  12-13-23 @ 06:31   104<H>/84<H>/55/1.0    Amylase / Lipase Trend  02-26-24 @ 13:45   -   -- / 72    Albumin Trend  03-19-24 @ 05:00   -   3.9  03-18-24 @ 22:00   -   3.4  03-18-24 @ 17:05   -   4.4  02-27-24 @ 06:31   -   3.7  02-26-24 @ 13:45   -   3.8  01-24-24 @ 06:41   -   3.5    Glucose Trend  03-20-24 @ 06:00   -  128<H> -- --  03-19-24 @ 20:43   -  113<H> -- --  03-19-24 @ 05:00   -  84 -- --  03-18-24 @ 22:00   -  96 -- --  03-18-24 @ 17:05   -  145<H> -- --    A1C with Estimated Average Glucose Result: 4.8 % [4.0 - 5.6] (01-24-24 @ 06:41)    ROS   Unable to obtain since pt sedated     ICU Vital Signs Last 24 Hrs  T(C): 36.8 (20 Mar 2024 07:00), Max: 37.1 (20 Mar 2024 02:00)  T(F): 98.3 (20 Mar 2024 07:00), Max: 98.7 (20 Mar 2024 02:00)  HR: 67 (20 Mar 2024 10:00) (66 - 112)  BP: 119/89 (20 Mar 2024 10:00) (112/75 - 138/97)  BP(mean): 100 (20 Mar 2024 10:00) (86 - 111)  ABP: --  ABP(mean): --  RR: 13 (20 Mar 2024 10:00) (12 - 25)  SpO2: 97% (20 Mar 2024 10:00) (95% - 100%)    O2 Parameters below as of 20 Mar 2024 10:00  Patient On (Oxygen Delivery Method): room air    I&O's Detail    19 Mar 2024 07:01  -  20 Mar 2024 07:00  --------------------------------------------------------  IN:    Dexmedetomidine: 138 mL  Total IN: 138 mL    OUT:  Total OUT: 0 mL    Total NET: 138 mL      20 Mar 2024 07:01  -  20 Mar 2024 11:00  --------------------------------------------------------  IN:    Dexmedetomidine: 23 mL    IV PiggyBack: 100 mL  Total IN: 123 mL    OUT:  Total OUT: 0 mL    Total NET: 123 mL    Physical Examination:  GENERAL:               Somnolent on sedation  HEENT:                    Pupils equal, reactive to light.  Symmetric. No JVD, Moist MM  PULM:                     Bilateral air entry, No Rales, No Rhonchi, No Wheezing  CVS:                         S1, S2,  No Murmur  ABD:                        Soft, nondistended, nontender, normoactive bowel sounds,   EXT:                         No edema, nontender, No Cyanosis or Clubbing   Vascular:                Warm Extremities, Normal Capillary refill, Normal Distal Pulses  SKIN:                       Warm and well perfused, no rashes noted.   NEURO:                  Somnolent, mild tremors, appears confused

## 2024-03-20 NOTE — DIETITIAN INITIAL EVALUATION ADULT - OTHER INFO
53 year old male with a PMH of ETOH abuse s/p multiple hospitalizations (last Feb 2024) for withdrawal prior who has been "detoxing" on his own with last drink a few days ago. Today pt gf called EMS for seizure activity, however pt initially refused treatment and they left. He continued to have multiple seizures so she called again and pt was given Ketamine IM en route for sedation. Patient admitted due to ETOH withdrawal with seizures , Ativan protocol noted, Patient tolerated po diet (3/19) , Skin intact , No Bm since PTA , Hx of ETOH abuse , Thiamine , Folic Acid  & MVI provided due to probable deficiency .

## 2024-03-20 NOTE — PROGRESS NOTE ADULT - ASSESSMENT
53 year old male with a PMH of ETOH abuse s/p multiple hospitalizations (last Feb 2024) admitted to ICU for tx of:    Assessment   1. ETOH withdrawal with seizures  2. Hyperactive Delirium drug induced (ketamine) vs DT's    Plan  CIWA protocol, seizure precautions  Ativan IVP for now, assess pt response; etco2 monitoring  Tapering Ativan dose  pt HDS and without respiratory distress  Oral diet   Thiamine and folate supplementation   sqh for dvt prophylaxis  ppi for gi prophylaxis  full code 53 year old male with a PMH of ETOH abuse s/p multiple hospitalizations (last Feb 2024) admitted to ICU for tx of:    Assessment   1. ETOH withdrawal with seizures  2. Hyperactive Delirium drug induced (ketamine) vs DT's    Plan  CIWA protocol, seizure precautions  Currently on Ativan taper  On precedex drip - wean as tolerated   etco2 monitoring  pt HDS and without respiratory distress  Oral diet   Thiamine and folate supplementation   sqh for dvt prophylaxis  ppi for gi prophylaxis  full code

## 2024-03-20 NOTE — DIETITIAN INITIAL EVALUATION ADULT - PERTINENT MEDS FT
MEDICATIONS  (STANDING):  chlorhexidine 2% Cloths 1 Application(s) Topical <User Schedule>  dexMEDEtomidine Infusion 0.5 MICROgram(s)/kG/Hr (7.94 mL/Hr) IV Continuous <Continuous>  folic acid Injectable 1 milliGRAM(s) IV Push daily  heparin   Injectable 5000 Unit(s) SubCutaneous every 8 hours  influenza   Vaccine 0.5 milliLiter(s) IntraMuscular once  LORazepam   Injectable 3 milliGRAM(s) IV Push every 4 hours  LORazepam   Injectable   IV Push   multivitamin 1 Tablet(s) Oral daily  pantoprazole  Injectable 40 milliGRAM(s) IV Push daily  potassium chloride  10 mEq/100 mL IVPB 10 milliEquivalent(s) IV Intermittent every 1 hour  thiamine IVPB 500 milliGRAM(s) IV Intermittent every 8 hours    MEDICATIONS  (PRN):  LORazepam   Injectable 4 milliGRAM(s) IV Push every 1 hour PRN Symptom-triggered: each CIWA -Ar score 8 or GREATER

## 2024-03-20 NOTE — DIETITIAN INITIAL EVALUATION ADULT - SIGNS/SYMPTOMS
as evidence by inability to consume po diet  possible variable nutrient intake in the setting ETOH consumption

## 2024-03-21 LAB
ALBUMIN SERPL ELPH-MCNC: 3.4 G/DL — SIGNIFICANT CHANGE UP (ref 3.3–5)
ALP SERPL-CCNC: 66 U/L — SIGNIFICANT CHANGE UP (ref 40–120)
ALT FLD-CCNC: 79 U/L — HIGH (ref 10–45)
ANION GAP SERPL CALC-SCNC: 13 MMOL/L — SIGNIFICANT CHANGE UP (ref 5–17)
APTT BLD: 26 SEC — SIGNIFICANT CHANGE UP (ref 24.5–35.6)
AST SERPL-CCNC: 361 U/L — HIGH (ref 10–40)
BILIRUB SERPL-MCNC: 0.9 MG/DL — SIGNIFICANT CHANGE UP (ref 0.2–1.2)
BUN SERPL-MCNC: 12 MG/DL — SIGNIFICANT CHANGE UP (ref 7–23)
CALCIUM SERPL-MCNC: 8.6 MG/DL — SIGNIFICANT CHANGE UP (ref 8.4–10.5)
CHLORIDE SERPL-SCNC: 102 MMOL/L — SIGNIFICANT CHANGE UP (ref 96–108)
CO2 SERPL-SCNC: 24 MMOL/L — SIGNIFICANT CHANGE UP (ref 22–31)
CREAT SERPL-MCNC: 0.56 MG/DL — SIGNIFICANT CHANGE UP (ref 0.5–1.3)
EGFR: 119 ML/MIN/1.73M2 — SIGNIFICANT CHANGE UP
GLUCOSE SERPL-MCNC: 97 MG/DL — SIGNIFICANT CHANGE UP (ref 70–99)
HCT VFR BLD CALC: 40.9 % — SIGNIFICANT CHANGE UP (ref 39–50)
HGB BLD-MCNC: 14.3 G/DL — SIGNIFICANT CHANGE UP (ref 13–17)
INR BLD: 0.89 RATIO — SIGNIFICANT CHANGE UP (ref 0.85–1.18)
MAGNESIUM SERPL-MCNC: 1.8 MG/DL — SIGNIFICANT CHANGE UP (ref 1.6–2.6)
MCHC RBC-ENTMCNC: 35 GM/DL — SIGNIFICANT CHANGE UP (ref 32–36)
MCHC RBC-ENTMCNC: 35.8 PG — HIGH (ref 27–34)
MCV RBC AUTO: 102.3 FL — HIGH (ref 80–100)
NRBC # BLD: 0 /100 WBCS — SIGNIFICANT CHANGE UP (ref 0–0)
PHOSPHATE SERPL-MCNC: 2.8 MG/DL — SIGNIFICANT CHANGE UP (ref 2.5–4.5)
PLATELET # BLD AUTO: 113 K/UL — LOW (ref 150–400)
POTASSIUM SERPL-MCNC: 3.6 MMOL/L — SIGNIFICANT CHANGE UP (ref 3.5–5.3)
POTASSIUM SERPL-SCNC: 3.6 MMOL/L — SIGNIFICANT CHANGE UP (ref 3.5–5.3)
PROT SERPL-MCNC: 6.8 G/DL — SIGNIFICANT CHANGE UP (ref 6–8.3)
PROTHROM AB SERPL-ACNC: 10.2 SEC — SIGNIFICANT CHANGE UP (ref 9.5–13)
RBC # BLD: 4 M/UL — LOW (ref 4.2–5.8)
RBC # FLD: 11.4 % — SIGNIFICANT CHANGE UP (ref 10.3–14.5)
SODIUM SERPL-SCNC: 139 MMOL/L — SIGNIFICANT CHANGE UP (ref 135–145)
WBC # BLD: 5.64 K/UL — SIGNIFICANT CHANGE UP (ref 3.8–10.5)
WBC # FLD AUTO: 5.64 K/UL — SIGNIFICANT CHANGE UP (ref 3.8–10.5)

## 2024-03-21 RX ORDER — NICOTINE POLACRILEX 2 MG
1 GUM BUCCAL DAILY
Refills: 0 | Status: DISCONTINUED | OUTPATIENT
Start: 2024-03-21 | End: 2024-03-23

## 2024-03-21 RX ORDER — PHENOBARBITAL 60 MG
130 TABLET ORAL ONCE
Refills: 0 | Status: DISCONTINUED | OUTPATIENT
Start: 2024-03-21 | End: 2024-03-21

## 2024-03-21 RX ORDER — DIAZEPAM 5 MG
10 TABLET ORAL ONCE
Refills: 0 | Status: DISCONTINUED | OUTPATIENT
Start: 2024-03-21 | End: 2024-03-21

## 2024-03-21 RX ADMIN — Medication 2 MILLIGRAM(S): at 01:30

## 2024-03-21 RX ADMIN — Medication 1 MILLIGRAM(S): at 14:15

## 2024-03-21 RX ADMIN — CHLORHEXIDINE GLUCONATE 1 APPLICATION(S): 213 SOLUTION TOPICAL at 05:03

## 2024-03-21 RX ADMIN — DEXMEDETOMIDINE HYDROCHLORIDE IN 0.9% SODIUM CHLORIDE 7.94 MICROGRAM(S)/KG/HR: 4 INJECTION INTRAVENOUS at 12:41

## 2024-03-21 RX ADMIN — Medication 2 MILLIGRAM(S): at 17:01

## 2024-03-21 RX ADMIN — DEXMEDETOMIDINE HYDROCHLORIDE IN 0.9% SODIUM CHLORIDE 7.94 MICROGRAM(S)/KG/HR: 4 INJECTION INTRAVENOUS at 06:09

## 2024-03-21 RX ADMIN — HEPARIN SODIUM 5000 UNIT(S): 5000 INJECTION INTRAVENOUS; SUBCUTANEOUS at 14:15

## 2024-03-21 RX ADMIN — Medication 105 MILLIGRAM(S): at 14:24

## 2024-03-21 RX ADMIN — Medication 2 MILLIGRAM(S): at 21:41

## 2024-03-21 RX ADMIN — Medication 1 PATCH: at 19:15

## 2024-03-21 RX ADMIN — PANTOPRAZOLE SODIUM 40 MILLIGRAM(S): 20 TABLET, DELAYED RELEASE ORAL at 12:31

## 2024-03-21 RX ADMIN — Medication 1 TABLET(S): at 12:31

## 2024-03-21 RX ADMIN — HEPARIN SODIUM 5000 UNIT(S): 5000 INJECTION INTRAVENOUS; SUBCUTANEOUS at 21:40

## 2024-03-21 RX ADMIN — Medication 1 PATCH: at 14:15

## 2024-03-21 RX ADMIN — Medication 4 MILLIGRAM(S): at 00:15

## 2024-03-21 RX ADMIN — Medication 130 MILLIGRAM(S): at 02:17

## 2024-03-21 RX ADMIN — DEXMEDETOMIDINE HYDROCHLORIDE IN 0.9% SODIUM CHLORIDE 7.94 MICROGRAM(S)/KG/HR: 4 INJECTION INTRAVENOUS at 02:15

## 2024-03-21 RX ADMIN — Medication 2 MILLIGRAM(S): at 06:09

## 2024-03-21 RX ADMIN — Medication 10 MILLIGRAM(S): at 03:55

## 2024-03-21 RX ADMIN — Medication 105 MILLIGRAM(S): at 05:00

## 2024-03-21 RX ADMIN — Medication 2 MILLIGRAM(S): at 14:15

## 2024-03-21 RX ADMIN — Medication 2 MILLIGRAM(S): at 10:30

## 2024-03-21 RX ADMIN — Medication 105 MILLIGRAM(S): at 21:40

## 2024-03-21 RX ADMIN — Medication 4 MILLIGRAM(S): at 03:35

## 2024-03-21 RX ADMIN — HEPARIN SODIUM 5000 UNIT(S): 5000 INJECTION INTRAVENOUS; SUBCUTANEOUS at 05:00

## 2024-03-21 NOTE — PROGRESS NOTE ADULT - ASSESSMENT
53 year old male with a PMH of ETOH abuse s/p multiple hospitalizations (last Feb 2024) admitted to ICU for tx of:    Assessment   1. ETOH withdrawal with seizures  2. Hyperactive Delirium drug induced (ketamine) vs DT's    Plan  CIWA protocol, seizure precautions  Currently on Ativan taper  On precedex drip - wean as tolerated   etco2 monitoring  pt HDS and without respiratory distress  Oral diet   Thiamine and folate supplementation   sqh for dvt prophylaxis  ppi for gi prophylaxis  full code 53 year old male with a PMH of ETOH abuse s/p multiple hospitalizations (last Feb 2024) admitted to ICU for tx of:    Assessment   1. ETOH withdrawal with seizures  2. Hyperactive Delirium drug induced (ketamine) vs DT's    Plan  CIWA protocol, seizure precautions  Currently on Ativan taper  On precedex drip - wean as tolerated   etco2 monitoring  pt HDS and without respiratory distress  Oral diet   Thiamine and folate supplementation   sqh for dvt prophylaxis  ppi for gi prophylaxis  Wife called and updated  full code

## 2024-03-21 NOTE — PROGRESS NOTE ADULT - SUBJECTIVE AND OBJECTIVE BOX
Follow-up Critical Care Progress Note  Chief Complaint : Alcohol use with withdrawal    No new events overnight sedated on precedex drip. has wrist restraints in place    Allergies :fish (Unknown)  penicillin (Unknown)  shellfish (Unknown)    PAST MEDICAL & SURGICAL HISTORY:  Alcohol abuse    No significant past surgical history    MEDICATIONS  (STANDING):  chlorhexidine 2% Cloths 1 Application(s) Topical <User Schedule>  dexMEDEtomidine Infusion 0.5 MICROgram(s)/kG/Hr (7.94 mL/Hr) IV Continuous <Continuous>  folic acid Injectable 1 milliGRAM(s) IV Push daily  heparin   Injectable 5000 Unit(s) SubCutaneous every 8 hours  influenza   Vaccine 0.5 milliLiter(s) IntraMuscular once  LORazepam   Injectable   IV Push   LORazepam   Injectable 2 milliGRAM(s) IV Push every 4 hours  multivitamin 1 Tablet(s) Oral daily  pantoprazole  Injectable 40 milliGRAM(s) IV Push daily  thiamine IVPB 500 milliGRAM(s) IV Intermittent every 8 hours    MEDICATIONS  (PRN):  LORazepam   Injectable 4 milliGRAM(s) IV Push every 1 hour PRN Symptom-triggered: each CIWA -Ar score 8 or GREATER    LABS:                        12.2   4.78  )-----------( 102      ( 20 Mar 2024 06:00 )             36.1   03-20    138  |  100  |  14  ----------------------------<  128<H>  3.3<L>   |  26  |  0.59    Ca    8.4      20 Mar 2024 06:00  Phos  4.2     03-20  Mg     1.9     03-20    TPro  7.3  /  Alb  3.9  /  TBili  1.0  /  DBili  x   /  AST  62<H>  /  ALT  50<H>  /  AlkPhos  65  03-19    COVID  01-23-24 @ 23:00  COVID -   NotDetec  02-05-22 @ 15:26  COVID -   NotDetec    WBC Trend  03-20-24 @ 06:00   -  4.78  03-19-24 @ 05:00   -  6.01  03-18-24 @ 17:05   -  9.79    H/H Trend  03-20-24 @ 06:00   -   12.2<L>/ 36.1<L>  03-19-24 @ 05:00   -   13.1/ 38.5<L>  03-18-24 @ 17:05   -   13.4/ 37.3<L>  02-27-24 @ 06:31   -   12.5<L>/ 36.3<L>  02-26-24 @ 13:00   -   14.1/ 39.3    Stool Occult Blood    Platelet Trend  03-20-24 @ 06:00   -  102<L>  03-19-24 @ 05:00   -  104<L>  03-18-24 @ 17:05   -  124<L>    Trend Sodium  03-20-24 @ 06:00   -  138  03-19-24 @ 20:43   -  135  03-19-24 @ 05:00   -  139  03-18-24 @ 22:00   -  134<L>  03-18-24 @ 17:05   -  133<L>    Trend Potassium  03-20-24 @ 06:00   -  3.3<L>  03-19-24 @ 20:43   -  4.1  03-19-24 @ 05:00   -  3.0<L>  03-18-24 @ 22:00   -  3.4<L>  03-18-24 @ 17:05   -  3.4<L>    Trend Bun/Cr  03-20-24 @ 06:00  BUN/CR -  14 / 0.59  03-19-24 @ 20:43  BUN/CR -  15 / 0.84  03-19-24 @ 05:00  BUN/CR -  11 / 0.88  03-18-24 @ 22:00  BUN/CR -  9 / 0.85  03-18-24 @ 17:05  BUN/CR -  11 / 0.96    Lactic Acid Trend    ABG Trend  12-12-23 @ 16:20   - 7.48<H>/36/92/98.8<H>    Trend AST/ALT/ALK Phos/Bili  03-19-24 @ 05:00   62<H>/50<H>/65/1.0  03-18-24 @ 22:00   62<H>/46<H>/58/0.8  03-18-24 @ 17:05   66<H>/53<H>/72/0.8  02-27-24 @ 06:31   114<H>/97<H>/58/0.7  02-26-24 @ 13:45   144<H>/110<H>/59/0.5  01-24-24 @ 06:41   206<H>/217<H>/62/0.8  01-23-24 @ 19:59   266<H>/247<H>/63/0.5  12-14-23 @ 06:04   68<H>/71<H>/53/0.8  12-13-23 @ 06:31   104<H>/84<H>/55/1.0    Amylase / Lipase Trend  02-26-24 @ 13:45   -   -- / 72    Albumin Trend  03-19-24 @ 05:00   -   3.9  03-18-24 @ 22:00   -   3.4  03-18-24 @ 17:05   -   4.4  02-27-24 @ 06:31   -   3.7  02-26-24 @ 13:45   -   3.8  01-24-24 @ 06:41   -   3.5    Glucose Trend  03-20-24 @ 06:00   -  128<H> -- --  03-19-24 @ 20:43   -  113<H> -- --  03-19-24 @ 05:00   -  84 -- --  03-18-24 @ 22:00   -  96 -- --  03-18-24 @ 17:05   -  145<H> -- --    A1C with Estimated Average Glucose Result: 4.8 % [4.0 - 5.6] (01-24-24 @ 06:41)    ROS   Unable to obtain since pt sedated     ICU Vital Signs Last 24 Hrs  T(C): 36.8 (20 Mar 2024 07:00), Max: 37.1 (20 Mar 2024 02:00)  T(F): 98.3 (20 Mar 2024 07:00), Max: 98.7 (20 Mar 2024 02:00)  HR: 67 (20 Mar 2024 10:00) (66 - 112)  BP: 119/89 (20 Mar 2024 10:00) (112/75 - 138/97)  BP(mean): 100 (20 Mar 2024 10:00) (86 - 111)  ABP: --  ABP(mean): --  RR: 13 (20 Mar 2024 10:00) (12 - 25)  SpO2: 97% (20 Mar 2024 10:00) (95% - 100%)    O2 Parameters below as of 20 Mar 2024 10:00  Patient On (Oxygen Delivery Method): room air    I&O's Detail    19 Mar 2024 07:01  -  20 Mar 2024 07:00  --------------------------------------------------------  IN:    Dexmedetomidine: 138 mL  Total IN: 138 mL    OUT:  Total OUT: 0 mL    Total NET: 138 mL      20 Mar 2024 07:01  -  20 Mar 2024 11:00  --------------------------------------------------------  IN:    Dexmedetomidine: 23 mL    IV PiggyBack: 100 mL  Total IN: 123 mL    OUT:  Total OUT: 0 mL    Total NET: 123 mL    Physical Examination:  GENERAL:               Somnolent on sedation  HEENT:                    Pupils equal, reactive to light.  Symmetric. No JVD, Moist MM  PULM:                     Bilateral air entry, No Rales, No Rhonchi, No Wheezing  CVS:                         S1, S2,  No Murmur  ABD:                        Soft, nondistended, nontender, normoactive bowel sounds,   EXT:                         No edema, nontender, No Cyanosis or Clubbing   Vascular:                Warm Extremities, Normal Capillary refill, Normal Distal Pulses  SKIN:                       Warm and well perfused, no rashes noted.   NEURO:                  Somnolent, mild tremors, appears confused              Follow-up Critical Care Progress Note  Chief Complaint : Alcohol use with withdrawal    No new events overnight sedated on precedex drip. has wrist restraints in place    Allergies :fish (Unknown)  penicillin (Unknown)  shellfish (Unknown)    PAST MEDICAL & SURGICAL HISTORY:  Alcohol abuse    No significant past surgical history    MEDICATIONS  (STANDING):  chlorhexidine 2% Cloths 1 Application(s) Topical <User Schedule>  dexMEDEtomidine Infusion 0.5 MICROgram(s)/kG/Hr (7.94 mL/Hr) IV Continuous <Continuous>  folic acid Injectable 1 milliGRAM(s) IV Push daily  heparin   Injectable 5000 Unit(s) SubCutaneous every 8 hours  influenza   Vaccine 0.5 milliLiter(s) IntraMuscular once  LORazepam   Injectable   IV Push   LORazepam   Injectable 2 milliGRAM(s) IV Push every 4 hours  multivitamin 1 Tablet(s) Oral daily  pantoprazole  Injectable 40 milliGRAM(s) IV Push daily  thiamine IVPB 500 milliGRAM(s) IV Intermittent every 8 hours    MEDICATIONS  (PRN):  LORazepam   Injectable 4 milliGRAM(s) IV Push every 1 hour PRN Symptom-triggered: each CIWA -Ar score 8 or GREATER    LABS:                        14.3   5.64  )-----------( 113      ( 21 Mar 2024 05:00 )             40.9   03-21    139  |  102  |  12  ----------------------------<  97  3.6   |  24  |  0.56    Ca    8.6      21 Mar 2024 05:00  Phos  2.8     03-21  Mg     1.8     03-21    TPro  6.8  /  Alb  3.4  /  TBili  0.9  /  DBili  x   /  AST  361<H>  /  ALT  79<H>  /  AlkPhos  66  03-21    COVID  01-23-24 @ 23:00  COVID -   NotDetec  02-05-22 @ 15:26  COVID -   NotDetec      Procalcitonin Trend    WBC Trend  03-21-24 @ 05:00   -  5.64  03-20-24 @ 06:00   -  4.78  03-19-24 @ 05:00   -  6.01  03-18-24 @ 17:05   -  9.79    H/H Trend  03-21-24 @ 05:00   -   14.3/ 40.9  03-20-24 @ 06:00   -   12.2<L>/ 36.1<L>  03-19-24 @ 05:00   -   13.1/ 38.5<L>  03-18-24 @ 17:05   -   13.4/ 37.3<L>  02-27-24 @ 06:31   -   12.5<L>/ 36.3<L>  02-26-24 @ 13:00   -   14.1/ 39.3    Stool Occult Blood    Platelet Trend  03-21-24 @ 05:00   -  113<L>  03-20-24 @ 06:00   -  102<L>  03-19-24 @ 05:00   -  104<L>  03-18-24 @ 17:05   -  124<L>    Trend Sodium  03-21-24 @ 05:00   -  139  03-20-24 @ 06:00   -  138  03-19-24 @ 20:43   -  135  03-19-24 @ 05:00   -  139  03-18-24 @ 22:00   -  134<L>  03-18-24 @ 17:05   -  133<L>    Trend Potassium  03-21-24 @ 05:00   -  3.6  03-20-24 @ 06:00   -  3.3<L>  03-19-24 @ 20:43   -  4.1  03-19-24 @ 05:00   -  3.0<L>  03-18-24 @ 22:00   -  3.4<L>  03-18-24 @ 17:05   -  3.4<L>    Trend Bun/Cr  03-21-24 @ 05:00  BUN/CR -  12 / 0.56  03-20-24 @ 06:00  BUN/CR -  14 / 0.59  03-19-24 @ 20:43  BUN/CR -  15 / 0.84  03-19-24 @ 05:00  BUN/CR -  11 / 0.88  03-18-24 @ 22:00  BUN/CR -  9 / 0.85  03-18-24 @ 17:05  BUN/CR -  11 / 0.96    Lactic Acid Trend    ABG Trend  12-12-23 @ 16:20   - 7.48<H>/36/92/98.8<H>    Trend AST/ALT/ALK Phos/Bili  03-21-24 @ 05:00   361<H>/79<H>/66/0.9  03-19-24 @ 05:00   62<H>/50<H>/65/1.0  03-18-24 @ 22:00   62<H>/46<H>/58/0.8  03-18-24 @ 17:05   66<H>/53<H>/72/0.8  02-27-24 @ 06:31   114<H>/97<H>/58/0.7  02-26-24 @ 13:45   144<H>/110<H>/59/0.5  01-24-24 @ 06:41   206<H>/217<H>/62/0.8  01-23-24 @ 19:59   266<H>/247<H>/63/0.5  12-14-23 @ 06:04   68<H>/71<H>/53/0.8  12-13-23 @ 06:31   104<H>/84<H>/55/1.0      Ammonia Trend      Amylase / Lipase Trend  02-26-24 @ 13:45   -   -- / 72      Albumin Trend  03-21-24 @ 05:00   -   3.4  03-19-24 @ 05:00   -   3.9  03-18-24 @ 22:00   -   3.4  03-18-24 @ 17:05   -   4.4  02-27-24 @ 06:31   -   3.7  02-26-24 @ 13:45   -   3.8      PTT - PT - INR Trend  03-21-24 @ 05:00   -   26.0 - 10.2 - 0.89    Glucose Trend  03-21-24 @ 05:00   -  97 -- --  03-20-24 @ 06:00   -  128<H> -- --  03-19-24 @ 20:43   -  113<H> -- --  03-19-24 @ 05:00   -  84 -- --  03-18-24 @ 22:00   -  96 -- --  03-18-24 @ 17:05   -  145<H> -- --    A1C with Estimated Average Glucose Result: 4.8 % [4.0 - 5.6] (01-24-24 @ 06:41)    Rad  < from: Xray Chest 1 View AP/PA (03.18.24 @ 18:08) >  No radiographic evidence of active chest disease..    < end of copied text >  < from: CT Head No Cont (03.18.24 @ 17:26) >  1. No evidence of extra-axial collection, acute hemorrhage or mass effect.  2. Additional findings described in detail above.      < end of copied text >      ROS   Unable to obtain since pt sedated     ICU Vital Signs Last 24 Hrs  T(C): 36.8 (21 Mar 2024 08:00), Max: 36.9 (20 Mar 2024 11:00)  T(F): 98.3 (21 Mar 2024 08:00), Max: 98.4 (20 Mar 2024 11:00)  HR: 67 (21 Mar 2024 09:00) (60 - 76)  BP: 96/82 (21 Mar 2024 09:00) (96/82 - 149/101)  BP(mean): 88 (21 Mar 2024 09:00) (73 - 116)  ABP: --  ABP(mean): --  RR: 12 (21 Mar 2024 09:00) (11 - 19)  SpO2: 98% (21 Mar 2024 09:00) (96% - 99%)    O2 Parameters below as of 21 Mar 2024 05:00  Patient On (Oxygen Delivery Method): room air    I&O's Detail    20 Mar 2024 07:01  -  21 Mar 2024 07:00  --------------------------------------------------------  IN:    Dexmedetomidine: 434.6 mL    IV PiggyBack: 300 mL  Total IN: 734.6 mL    OUT:    Intermittent Catheterization - Urethral (mL): 1600 mL    Voided (mL): 100 mL  Total OUT: 1700 mL    Total NET: -965.4 mL      21 Mar 2024 07:01  -  21 Mar 2024 10:22  --------------------------------------------------------  IN:    Dexmedetomidine: 58.8 mL  Total IN: 58.8 mL    OUT:  Total OUT: 0 mL    Total NET: 58.8 mL      Physical Examination:  GENERAL:               Somnolent on sedation  HEENT:                    Pupils equal, reactive to light.  Symmetric. No JVD, Moist MM  PULM:                     Bilateral air entry, No Rales, No Rhonchi, No Wheezing  CVS:                         S1, S2,  No Murmur  ABD:                        Soft, nondistended, nontender, normoactive bowel sounds,   EXT:                         No edema, nontender, No Cyanosis or Clubbing   Vascular:                Warm Extremities, Normal Capillary refill, Normal Distal Pulses  SKIN:                       Warm and well perfused, no rashes noted.   NEURO:                  Somnolent, mild tremors, appears confused

## 2024-03-22 LAB
ALBUMIN SERPL ELPH-MCNC: 3.5 G/DL — SIGNIFICANT CHANGE UP (ref 3.3–5)
ALP SERPL-CCNC: 71 U/L — SIGNIFICANT CHANGE UP (ref 40–120)
ALT FLD-CCNC: 94 U/L — HIGH (ref 10–45)
ANION GAP SERPL CALC-SCNC: 10 MMOL/L — SIGNIFICANT CHANGE UP (ref 5–17)
AST SERPL-CCNC: 409 U/L — HIGH (ref 10–40)
BILIRUB SERPL-MCNC: 0.5 MG/DL — SIGNIFICANT CHANGE UP (ref 0.2–1.2)
BUN SERPL-MCNC: 12 MG/DL — SIGNIFICANT CHANGE UP (ref 7–23)
CALCIUM SERPL-MCNC: 8.8 MG/DL — SIGNIFICANT CHANGE UP (ref 8.4–10.5)
CHLORIDE SERPL-SCNC: 99 MMOL/L — SIGNIFICANT CHANGE UP (ref 96–108)
CO2 SERPL-SCNC: 24 MMOL/L — SIGNIFICANT CHANGE UP (ref 22–31)
CREAT SERPL-MCNC: 0.6 MG/DL — SIGNIFICANT CHANGE UP (ref 0.5–1.3)
EGFR: 116 ML/MIN/1.73M2 — SIGNIFICANT CHANGE UP
GLUCOSE SERPL-MCNC: 69 MG/DL — LOW (ref 70–99)
HCT VFR BLD CALC: 38.1 % — LOW (ref 39–50)
HGB BLD-MCNC: 13.4 G/DL — SIGNIFICANT CHANGE UP (ref 13–17)
MAGNESIUM SERPL-MCNC: 1.9 MG/DL — SIGNIFICANT CHANGE UP (ref 1.6–2.6)
MCHC RBC-ENTMCNC: 35.2 GM/DL — SIGNIFICANT CHANGE UP (ref 32–36)
MCHC RBC-ENTMCNC: 36 PG — HIGH (ref 27–34)
MCV RBC AUTO: 102.4 FL — HIGH (ref 80–100)
NRBC # BLD: 0 /100 WBCS — SIGNIFICANT CHANGE UP (ref 0–0)
PHOSPHATE SERPL-MCNC: 2.9 MG/DL — SIGNIFICANT CHANGE UP (ref 2.5–4.5)
PLATELET # BLD AUTO: 127 K/UL — LOW (ref 150–400)
POTASSIUM SERPL-MCNC: 4 MMOL/L — SIGNIFICANT CHANGE UP (ref 3.5–5.3)
POTASSIUM SERPL-SCNC: 4 MMOL/L — SIGNIFICANT CHANGE UP (ref 3.5–5.3)
PROT SERPL-MCNC: 6.7 G/DL — SIGNIFICANT CHANGE UP (ref 6–8.3)
RBC # BLD: 3.72 M/UL — LOW (ref 4.2–5.8)
RBC # FLD: 11.4 % — SIGNIFICANT CHANGE UP (ref 10.3–14.5)
SODIUM SERPL-SCNC: 133 MMOL/L — LOW (ref 135–145)
WBC # BLD: 5.34 K/UL — SIGNIFICANT CHANGE UP (ref 3.8–10.5)
WBC # FLD AUTO: 5.34 K/UL — SIGNIFICANT CHANGE UP (ref 3.8–10.5)

## 2024-03-22 RX ORDER — PANTOPRAZOLE SODIUM 20 MG/1
40 TABLET, DELAYED RELEASE ORAL
Refills: 0 | Status: DISCONTINUED | OUTPATIENT
Start: 2024-03-22 | End: 2024-03-23

## 2024-03-22 RX ORDER — FOLIC ACID 0.8 MG
1 TABLET ORAL DAILY
Refills: 0 | Status: DISCONTINUED | OUTPATIENT
Start: 2024-03-22 | End: 2024-03-23

## 2024-03-22 RX ORDER — THIAMINE MONONITRATE (VIT B1) 100 MG
100 TABLET ORAL DAILY
Refills: 0 | Status: DISCONTINUED | OUTPATIENT
Start: 2024-03-23 | End: 2024-03-23

## 2024-03-22 RX ORDER — METOPROLOL TARTRATE 50 MG
5 TABLET ORAL ONCE
Refills: 0 | Status: COMPLETED | OUTPATIENT
Start: 2024-03-22 | End: 2024-03-22

## 2024-03-22 RX ADMIN — Medication 1 TABLET(S): at 12:19

## 2024-03-22 RX ADMIN — Medication 1 PATCH: at 19:00

## 2024-03-22 RX ADMIN — DEXMEDETOMIDINE HYDROCHLORIDE IN 0.9% SODIUM CHLORIDE 7.94 MICROGRAM(S)/KG/HR: 4 INJECTION INTRAVENOUS at 01:18

## 2024-03-22 RX ADMIN — Medication 1 MILLIGRAM(S): at 22:55

## 2024-03-22 RX ADMIN — Medication 1 MILLIGRAM(S): at 05:16

## 2024-03-22 RX ADMIN — CHLORHEXIDINE GLUCONATE 1 APPLICATION(S): 213 SOLUTION TOPICAL at 05:17

## 2024-03-22 RX ADMIN — HEPARIN SODIUM 5000 UNIT(S): 5000 INJECTION INTRAVENOUS; SUBCUTANEOUS at 05:17

## 2024-03-22 RX ADMIN — Medication 5 MILLIGRAM(S): at 13:49

## 2024-03-22 RX ADMIN — Medication 1 MILLIGRAM(S): at 10:02

## 2024-03-22 RX ADMIN — Medication 1 MILLIGRAM(S): at 01:13

## 2024-03-22 RX ADMIN — Medication 1 PATCH: at 08:00

## 2024-03-22 RX ADMIN — Medication 105 MILLIGRAM(S): at 14:42

## 2024-03-22 RX ADMIN — Medication 1 MILLIGRAM(S): at 18:52

## 2024-03-22 RX ADMIN — HEPARIN SODIUM 5000 UNIT(S): 5000 INJECTION INTRAVENOUS; SUBCUTANEOUS at 14:41

## 2024-03-22 RX ADMIN — Medication 1 MILLIGRAM(S): at 14:41

## 2024-03-22 RX ADMIN — Medication 1 PATCH: at 14:30

## 2024-03-22 RX ADMIN — Medication 1 PATCH: at 14:42

## 2024-03-22 RX ADMIN — Medication 1 MILLIGRAM(S): at 12:19

## 2024-03-22 RX ADMIN — Medication 105 MILLIGRAM(S): at 05:17

## 2024-03-22 NOTE — PROGRESS NOTE ADULT - ASSESSMENT
53 year old male with a PMH of ETOH abuse s/p multiple hospitalizations (last Feb 2024) admitted to ICU for tx of:    Assessment   1. ETOH withdrawal with seizures  2. Hyperactive Delirium drug induced (ketamine) vs DT's    Plan  CIWA protocol, seizure precautions  Currently on Ativan taper  Off precedex drip now   etco2 monitoring  pt HDS and without respiratory distress  Oral diet   Thiamine and folate supplementation   sqh for dvt prophylaxis  ppi for gi prophylaxis  Plan to transfer to general floors today   Wife called and updated    full code

## 2024-03-22 NOTE — PROGRESS NOTE ADULT - SUBJECTIVE AND OBJECTIVE BOX
Follow-up Critical Care Progress Note  Chief Complaint : Alcohol use with withdrawal    No new events overnight. Doing better. Pt is currently off sedation     Allergies :fish (Unknown)  penicillin (Unknown)  shellfish (Unknown)    PAST MEDICAL & SURGICAL HISTORY:  Alcohol abuse    No significant past surgical history    MEDICATIONS  (STANDING):  chlorhexidine 2% Cloths 1 Application(s) Topical <User Schedule>  folic acid 1 milliGRAM(s) Oral daily  heparin   Injectable 5000 Unit(s) SubCutaneous every 8 hours  influenza   Vaccine 0.5 milliLiter(s) IntraMuscular once  LORazepam   Injectable   IV Push   LORazepam   Injectable 1 milliGRAM(s) IV Push every 4 hours  multivitamin 1 Tablet(s) Oral daily  nicotine - 21 mG/24Hr(s) Patch 1 Patch Transdermal daily  pantoprazole    Tablet 40 milliGRAM(s) Oral before breakfast  thiamine IVPB 500 milliGRAM(s) IV Intermittent every 8 hours    MEDICATIONS  (PRN):  LORazepam   Injectable 4 milliGRAM(s) IV Push every 1 hour PRN Symptom-triggered: each CIWA -Ar score 8 or GREATER      LABS:                                   13.4   5.34  )-----------( 127      ( 22 Mar 2024 05:00 )             38.1   03-22    133<L>  |  99  |  12  ----------------------------<  69<L>  4.0   |  24  |  0.60    Ca    8.8      22 Mar 2024 05:00  Phos  2.9     03-22  Mg     1.9     03-22    TPro  6.7  /  Alb  3.5  /  TBili  0.5  /  DBili  x   /  AST  409<H>  /  ALT  94<H>  /  AlkPhos  71  03-22    COVID  01-23-24 @ 23:00  COVID -   NotDetec  02-05-22 @ 15:26  COVID -   NotDetec      Procalcitonin Trend    WBC Trend  03-22-24 @ 05:00   -  5.34  03-21-24 @ 05:00   -  5.64  03-20-24 @ 06:00   -  4.78    H/H Trend  03-22-24 @ 05:00   -   13.4/ 38.1<L>  03-21-24 @ 05:00   -   14.3/ 40.9  03-20-24 @ 06:00   -   12.2<L>/ 36.1<L>  03-19-24 @ 05:00   -   13.1/ 38.5<L>  03-18-24 @ 17:05   -   13.4/ 37.3<L>  02-27-24 @ 06:31   -   12.5<L>/ 36.3<L>    Stool Occult Blood    Platelet Trend  03-22-24 @ 05:00   -  127<L>  03-21-24 @ 05:00   -  113<L>  03-20-24 @ 06:00   -  102<L>    Trend Sodium  03-22-24 @ 05:00   -  133<L>  03-21-24 @ 05:00   -  139  03-20-24 @ 06:00   -  138  03-19-24 @ 20:43   -  135    Trend Potassium  03-22-24 @ 05:00   -  4.0  03-21-24 @ 05:00   -  3.6  03-20-24 @ 06:00   -  3.3<L>  03-19-24 @ 20:43   -  4.1    Trend Bun/Cr  03-22-24 @ 05:00  BUN/CR -  12 / 0.60  03-21-24 @ 05:00  BUN/CR -  12 / 0.56  03-20-24 @ 06:00  BUN/CR -  14 / 0.59  03-19-24 @ 20:43  BUN/CR -  15 / 0.84    Lactic Acid Trend    ABG Trend  12-12-23 @ 16:20   - 7.48<H>/36/92/98.8<H>    Trend AST/ALT/ALK Phos/Bili  03-22-24 @ 05:00   409<H>/94<H>/71/0.5  03-21-24 @ 05:00   361<H>/79<H>/66/0.9  03-19-24 @ 05:00   62<H>/50<H>/65/1.0  03-18-24 @ 22:00   62<H>/46<H>/58/0.8  03-18-24 @ 17:05   66<H>/53<H>/72/0.8  02-27-24 @ 06:31   114<H>/97<H>/58/0.7  02-26-24 @ 13:45   144<H>/110<H>/59/0.5  01-24-24 @ 06:41   206<H>/217<H>/62/0.8  01-23-24 @ 19:59   266<H>/247<H>/63/0.5  12-14-23 @ 06:04   68<H>/71<H>/53/0.8  12-13-23 @ 06:31   104<H>/84<H>/55/1.0      Amylase / Lipase Trend  02-26-24 @ 13:45   -   -- / 72      Albumin Trend  03-22-24 @ 05:00   -   3.5  03-21-24 @ 05:00   -   3.4  03-19-24 @ 05:00   -   3.9  03-18-24 @ 22:00   -   3.4  03-18-24 @ 17:05   -   4.4  02-27-24 @ 06:31   -   3.7      PTT - PT - INR Trend  03-21-24 @ 05:00   -   26.0 - 10.2 - 0.89    Glucose Trend  03-22-24 @ 05:00   -  69<L> -- --  03-21-24 @ 05:00   -  97 -- --  03-20-24 @ 06:00   -  128<H> -- --  03-19-24 @ 20:43   -  113<H> -- --    A1C with Estimated Average Glucose Result: 4.8 % [4.0 - 5.6] (01-24-24 @ 06:41)    Rad  < from: Xray Chest 1 View AP/PA (03.18.24 @ 18:08) >  No radiographic evidence of active chest disease..    < end of copied text >  < from: CT Head No Cont (03.18.24 @ 17:26) >  1. No evidence of extra-axial collection, acute hemorrhage or mass effect.  2. Additional findings described in detail above.      < end of copied text >      ROS   Pt stating feeling better   reports tremors are better   No new convulsions    ICU Vital Signs Last 24 Hrs  T(C): 36.9 (22 Mar 2024 08:00), Max: 37.1 (21 Mar 2024 19:00)  T(F): 98.4 (22 Mar 2024 08:00), Max: 98.8 (21 Mar 2024 19:00)  HR: 112 (22 Mar 2024 10:45) (72 - 112)  BP: 141/90 (22 Mar 2024 10:00) (93/67 - 143/99)  BP(mean): 106 (22 Mar 2024 10:00) (71 - 113)  ABP: --  ABP(mean): --  RR: 17 (22 Mar 2024 10:45) (11 - 25)  SpO2: 100% (22 Mar 2024 10:45) (91% - 100%)       I&O's Detail    21 Mar 2024 07:01  -  22 Mar 2024 07:00  --------------------------------------------------------  IN:    Dexmedetomidine: 212.2 mL  Total IN: 212.2 mL    OUT:    Voided (mL): 1350 mL  Total OUT: 1350 mL    Total NET: -1137.8 mL    Physical Examination:  GENERAL:               Awake and conversant, eager to go home   HEENT:                    Pupils equal, reactive to light.  Symmetric. No JVD, Moist MM  PULM:                     Bilateral air entry, No Rales, No Rhonchi, No Wheezing  CVS:                         S1, S2,  No Murmur  ABD:                        Soft, nondistended, nontender, normoactive bowel sounds,   EXT:                         No edema, nontender, No Cyanosis or Clubbing  Vascular:                Warm Extremities, Normal Capillary refill, Normal Distal Pulses  SKIN:                       Warm and well perfused, no rashes noted.   NEURO:                 AAOX3, no focal deficits noted on exam              Follow-up Critical Care Progress Note  Chief Complaint : Alcohol use with withdrawal    No new events overnight. Doing better. Pt is currently off sedation Precedex  discussed desire to go home today  discussed reasons why he wants to go home and reasons to stay  discussed need for pt, social work  states desire to go out patient AA, and states has plans  discussed if signs out [facility] may not get all services set up and is on benzo taper prior to going home  discussed risk of falls, seizures, trauma, withdrawals, seizures if signs out [facility].   states willing to stay for now    Allergies :fish (Unknown)  penicillin (Unknown)  shellfish (Unknown)    PAST MEDICAL & SURGICAL HISTORY:  Alcohol abuse    No significant past surgical history    MEDICATIONS  (STANDING):  chlorhexidine 2% Cloths 1 Application(s) Topical <User Schedule>  folic acid 1 milliGRAM(s) Oral daily  heparin   Injectable 5000 Unit(s) SubCutaneous every 8 hours  influenza   Vaccine 0.5 milliLiter(s) IntraMuscular once  LORazepam   Injectable   IV Push   LORazepam   Injectable 1 milliGRAM(s) IV Push every 4 hours  multivitamin 1 Tablet(s) Oral daily  nicotine - 21 mG/24Hr(s) Patch 1 Patch Transdermal daily  pantoprazole    Tablet 40 milliGRAM(s) Oral before breakfast  thiamine IVPB 500 milliGRAM(s) IV Intermittent every 8 hours    MEDICATIONS  (PRN):  LORazepam   Injectable 4 milliGRAM(s) IV Push every 1 hour PRN Symptom-triggered: each CIWA -Ar score 8 or GREATER      LABS:                                   13.4   5.34  )-----------( 127      ( 22 Mar 2024 05:00 )             38.1   03-22    133<L>  |  99  |  12  ----------------------------<  69<L>  4.0   |  24  |  0.60    Ca    8.8      22 Mar 2024 05:00  Phos  2.9     03-22  Mg     1.9     03-22    TPro  6.7  /  Alb  3.5  /  TBili  0.5  /  DBili  x   /  AST  409<H>  /  ALT  94<H>  /  AlkPhos  71  03-22    COVID  01-23-24 @ 23:00  COVID -   NotDetec  02-05-22 @ 15:26  COVID -   NotDetec      Procalcitonin Trend    WBC Trend  03-22-24 @ 05:00   -  5.34  03-21-24 @ 05:00   -  5.64  03-20-24 @ 06:00   -  4.78    H/H Trend  03-22-24 @ 05:00   -   13.4/ 38.1<L>  03-21-24 @ 05:00   -   14.3/ 40.9  03-20-24 @ 06:00   -   12.2<L>/ 36.1<L>  03-19-24 @ 05:00   -   13.1/ 38.5<L>  03-18-24 @ 17:05   -   13.4/ 37.3<L>  02-27-24 @ 06:31   -   12.5<L>/ 36.3<L>    Stool Occult Blood    Platelet Trend  03-22-24 @ 05:00   -  127<L>  03-21-24 @ 05:00   -  113<L>  03-20-24 @ 06:00   -  102<L>    Trend Sodium  03-22-24 @ 05:00   -  133<L>  03-21-24 @ 05:00   -  139  03-20-24 @ 06:00   -  138  03-19-24 @ 20:43   -  135    Trend Potassium  03-22-24 @ 05:00   -  4.0  03-21-24 @ 05:00   -  3.6  03-20-24 @ 06:00   -  3.3<L>  03-19-24 @ 20:43   -  4.1    Trend Bun/Cr  03-22-24 @ 05:00  BUN/CR -  12 / 0.60  03-21-24 @ 05:00  BUN/CR -  12 / 0.56  03-20-24 @ 06:00  BUN/CR -  14 / 0.59  03-19-24 @ 20:43  BUN/CR -  15 / 0.84    Lactic Acid Trend    ABG Trend  12-12-23 @ 16:20   - 7.48<H>/36/92/98.8<H>    Trend AST/ALT/ALK Phos/Bili  03-22-24 @ 05:00   409<H>/94<H>/71/0.5  03-21-24 @ 05:00   361<H>/79<H>/66/0.9  03-19-24 @ 05:00   62<H>/50<H>/65/1.0  03-18-24 @ 22:00   62<H>/46<H>/58/0.8  03-18-24 @ 17:05   66<H>/53<H>/72/0.8  02-27-24 @ 06:31   114<H>/97<H>/58/0.7       Amylase / Lipase Trend  02-26-24 @ 13:45   -   -- / 72      Albumin Trend  03-22-24 @ 05:00   -   3.5  03-21-24 @ 05:00   -   3.4  03-19-24 @ 05:00   -   3.9  03-18-24 @ 22:00   -   3.4  03-18-24 @ 17:05   -   4.4  02-27-24 @ 06:31   -   3.7      PTT - PT - INR Trend  03-21-24 @ 05:00   -   26.0 - 10.2 - 0.89    Glucose Trend  03-22-24 @ 05:00   -  69<L> -- --  03-21-24 @ 05:00   -  97 -- --  03-20-24 @ 06:00   -  128<H> -- --  03-19-24 @ 20:43   -  113<H> -- --    A1C with Estimated Average Glucose Result: 4.8 % [4.0 - 5.6] (01-24-24 @ 06:41)    Rad  < from: Xray Chest 1 View AP/PA (03.18.24 @ 18:08) >  No radiographic evidence of active chest disease..    < end of copied text >  < from: CT Head No Cont (03.18.24 @ 17:26) >  1. No evidence of extra-axial collection, acute hemorrhage or mass effect.  2. Additional findings described in detail above.      < end of copied text >      ROS   Pt stating feeling better   reports tremors are better   No new convulsions    ICU Vital Signs Last 24 Hrs  T(C): 36.9 (22 Mar 2024 08:00), Max: 37.1 (21 Mar 2024 19:00)  T(F): 98.4 (22 Mar 2024 08:00), Max: 98.8 (21 Mar 2024 19:00)  HR: 112 (22 Mar 2024 10:45) (72 - 112)  BP: 141/90 (22 Mar 2024 10:00) (93/67 - 143/99)  BP(mean): 106 (22 Mar 2024 10:00) (71 - 113)  ABP: --  ABP(mean): --  RR: 17 (22 Mar 2024 10:45) (11 - 25)  SpO2: 100% (22 Mar 2024 10:45) (91% - 100%)       I&O's Detail    21 Mar 2024 07:01  -  22 Mar 2024 07:00  --------------------------------------------------------  IN:    Dexmedetomidine: 212.2 mL  Total IN: 212.2 mL    OUT:    Voided (mL): 1350 mL  Total OUT: 1350 mL    Total NET: -1137.8 mL    Physical Examination:  GENERAL:               Awake and conversant, eager to go home   HEENT:                    Pupils equal, reactive to light.  Symmetric. No JVD, Moist MM  PULM:                     Bilateral air entry, No Rales, No Rhonchi, No Wheezing  CVS:                         S1, S2,  No Murmur  ABD:                        Soft, nondistended, nontender, normoactive bowel sounds,   EXT:                         No edema, nontender, No Cyanosis or Clubbing  Vascular:                Warm Extremities, Normal Capillary refill, Normal Distal Pulses  SKIN:                       Warm and well perfused, no rashes noted.   NEURO:                 AAOX3, no focal deficits noted on exam

## 2024-03-23 VITALS
WEIGHT: 143.3 LBS | HEART RATE: 120 BPM | SYSTOLIC BLOOD PRESSURE: 141 MMHG | TEMPERATURE: 98 F | RESPIRATION RATE: 18 BRPM | OXYGEN SATURATION: 99 % | DIASTOLIC BLOOD PRESSURE: 84 MMHG

## 2024-03-23 LAB
ALBUMIN SERPL ELPH-MCNC: 3.8 G/DL — SIGNIFICANT CHANGE UP (ref 3.3–5)
ALP SERPL-CCNC: 72 U/L — SIGNIFICANT CHANGE UP (ref 40–120)
ALT FLD-CCNC: 124 U/L — HIGH (ref 10–45)
ANION GAP SERPL CALC-SCNC: 10 MMOL/L — SIGNIFICANT CHANGE UP (ref 5–17)
AST SERPL-CCNC: 553 U/L — HIGH (ref 10–40)
BILIRUB SERPL-MCNC: 0.4 MG/DL — SIGNIFICANT CHANGE UP (ref 0.2–1.2)
BUN SERPL-MCNC: 12 MG/DL — SIGNIFICANT CHANGE UP (ref 7–23)
CALCIUM SERPL-MCNC: 9.3 MG/DL — SIGNIFICANT CHANGE UP (ref 8.4–10.5)
CHLORIDE SERPL-SCNC: 98 MMOL/L — SIGNIFICANT CHANGE UP (ref 96–108)
CO2 SERPL-SCNC: 25 MMOL/L — SIGNIFICANT CHANGE UP (ref 22–31)
CREAT SERPL-MCNC: 0.74 MG/DL — SIGNIFICANT CHANGE UP (ref 0.5–1.3)
EGFR: 109 ML/MIN/1.73M2 — SIGNIFICANT CHANGE UP
GLUCOSE SERPL-MCNC: 88 MG/DL — SIGNIFICANT CHANGE UP (ref 70–99)
HAV IGM SER-ACNC: SIGNIFICANT CHANGE UP
HBV CORE IGM SER-ACNC: SIGNIFICANT CHANGE UP
HBV SURFACE AG SER-ACNC: SIGNIFICANT CHANGE UP
HCT VFR BLD CALC: 36.8 % — LOW (ref 39–50)
HCV AB S/CO SERPL IA: 0.09 S/CO — SIGNIFICANT CHANGE UP (ref 0–0.99)
HCV AB SERPL-IMP: SIGNIFICANT CHANGE UP
HGB BLD-MCNC: 12.9 G/DL — LOW (ref 13–17)
MAGNESIUM SERPL-MCNC: 1.8 MG/DL — SIGNIFICANT CHANGE UP (ref 1.6–2.6)
MCHC RBC-ENTMCNC: 35.1 GM/DL — SIGNIFICANT CHANGE UP (ref 32–36)
MCHC RBC-ENTMCNC: 36.3 PG — HIGH (ref 27–34)
MCV RBC AUTO: 103.7 FL — HIGH (ref 80–100)
NRBC # BLD: 0 /100 WBCS — SIGNIFICANT CHANGE UP (ref 0–0)
PHOSPHATE SERPL-MCNC: 2.6 MG/DL — SIGNIFICANT CHANGE UP (ref 2.5–4.5)
PLATELET # BLD AUTO: 152 K/UL — SIGNIFICANT CHANGE UP (ref 150–400)
POTASSIUM SERPL-MCNC: 3.9 MMOL/L — SIGNIFICANT CHANGE UP (ref 3.5–5.3)
POTASSIUM SERPL-SCNC: 3.9 MMOL/L — SIGNIFICANT CHANGE UP (ref 3.5–5.3)
PROT SERPL-MCNC: 7.3 G/DL — SIGNIFICANT CHANGE UP (ref 6–8.3)
RBC # BLD: 3.55 M/UL — LOW (ref 4.2–5.8)
RBC # FLD: 11.6 % — SIGNIFICANT CHANGE UP (ref 10.3–14.5)
SODIUM SERPL-SCNC: 133 MMOL/L — LOW (ref 135–145)
WBC # BLD: 6.07 K/UL — SIGNIFICANT CHANGE UP (ref 3.8–10.5)
WBC # FLD AUTO: 6.07 K/UL — SIGNIFICANT CHANGE UP (ref 3.8–10.5)

## 2024-03-23 PROCEDURE — 99291 CRITICAL CARE FIRST HOUR: CPT

## 2024-03-23 PROCEDURE — 85025 COMPLETE CBC W/AUTO DIFF WBC: CPT

## 2024-03-23 PROCEDURE — 84100 ASSAY OF PHOSPHORUS: CPT

## 2024-03-23 PROCEDURE — 97162 PT EVAL MOD COMPLEX 30 MIN: CPT

## 2024-03-23 PROCEDURE — 85730 THROMBOPLASTIN TIME PARTIAL: CPT

## 2024-03-23 PROCEDURE — 93005 ELECTROCARDIOGRAM TRACING: CPT

## 2024-03-23 PROCEDURE — 70450 CT HEAD/BRAIN W/O DYE: CPT | Mod: MC

## 2024-03-23 PROCEDURE — 80074 ACUTE HEPATITIS PANEL: CPT

## 2024-03-23 PROCEDURE — 80053 COMPREHEN METABOLIC PANEL: CPT

## 2024-03-23 PROCEDURE — 96374 THER/PROPH/DIAG INJ IV PUSH: CPT

## 2024-03-23 PROCEDURE — 36415 COLL VENOUS BLD VENIPUNCTURE: CPT

## 2024-03-23 PROCEDURE — 99233 SBSQ HOSP IP/OBS HIGH 50: CPT | Mod: GC

## 2024-03-23 PROCEDURE — 85027 COMPLETE CBC AUTOMATED: CPT

## 2024-03-23 PROCEDURE — 80076 HEPATIC FUNCTION PANEL: CPT

## 2024-03-23 PROCEDURE — 83735 ASSAY OF MAGNESIUM: CPT

## 2024-03-23 PROCEDURE — 80307 DRUG TEST PRSMV CHEM ANLYZR: CPT

## 2024-03-23 PROCEDURE — 96361 HYDRATE IV INFUSION ADD-ON: CPT

## 2024-03-23 PROCEDURE — 71045 X-RAY EXAM CHEST 1 VIEW: CPT

## 2024-03-23 PROCEDURE — 85610 PROTHROMBIN TIME: CPT

## 2024-03-23 PROCEDURE — 80048 BASIC METABOLIC PNL TOTAL CA: CPT

## 2024-03-23 RX ADMIN — Medication 1 APPLICATION(S): at 05:34

## 2024-03-23 RX ADMIN — Medication 1 PATCH: at 07:23

## 2024-03-23 RX ADMIN — PANTOPRAZOLE SODIUM 40 MILLIGRAM(S): 20 TABLET, DELAYED RELEASE ORAL at 05:35

## 2024-03-23 NOTE — PROGRESS NOTE ADULT - ASSESSMENT
Assessment	  53 year old male with a PMH of ETOH use disorder s/p multiple hospitalizations (last Feb 2024) admitted to ICU for seizures, downgraded to telemetry unit.       #ETOH withdrawal with seizures   -CIWA protocol, CIWA score 1   -seizure precautions  -Continue Ativan Kathie   -Thiamine and folate supplementation  -multivitamin       # Hyperactive Delirium drug induced (ketamine) vs DT's  -continue ativan kathie   -CIWA protocol     #Elevated AST and ALT  -pending workup      #GI PPX  -pantoprazole     #DVT PPX  -heparin       Regular diet     Full code     Assessment	  53 year old male with a PMH of ETOH use disorder s/p multiple hospitalizations (last Feb 2024) admitted to ICU for etoh withdrawal seizures, downgraded to telemetry unit.       #ETOH withdrawal with seizures   -CIWA protocol, CIWA score 1   -seizure precautions  -Continue Ativan Kathie   -Thiamine and folate supplementation  -multivitamin       # Hyperactive Delirium drug induced (ketamine) vs DT's  -continue ativan kathie   -CIWA protocol     #Elevated AST and ALT  -pending workup      #GI PPX  -pantoprazole     #DVT PPX  -heparin       Regular diet     Full code

## 2024-03-23 NOTE — PROGRESS NOTE ADULT - ATTENDING SUPERVISION STATEMENT
Resident
normal...
Well appearing, well nourished, awake, alert, oriented to person, place, time/situation and in no apparent distress.

## 2024-03-23 NOTE — PROGRESS NOTE ADULT - ATTENDING COMMENTS
pt seen and examined  conversation had with patient as described above  can transfer to medical floor  ativan taper  social work f/u  PT ? role home home pt vs out patient pt    patient states has ankle injury and has difficulty ambulating leading to difficulty obaining job.
53 year old male with a PMH of ETOH use disorder s/p multiple hospitalizations (last Feb 2024) admitted to ICU for seizures, downgraded to telemetry unit. Plan: pt left AMA despite efforts to convince otherwise, see chart note, AMA form was signed, pt reinforeced importance of sobriety and close pmd follow up as well as lft monitoring, pt verbalized understanidng of all risks and need for follow up
53 year old male with a PMH of ETOH abuse s/p multiple hospitalizations (last Feb 2024) admitted to ICU for tx of:    Assessment   1. ETOH withdrawal with seizures  2. Hyperactive Delirium drug induced (ketamine) vs DT's    Plan  Taper precedex  CIWA protocol, seizure precautions  Ativan IVP for now, assess pt response  Tapering Ativan dose  pt HDS and without respiratory distress  Oral diet   Thiamine and folate supplementation  Nicotine patch   sqh for dvt prophylaxis  ppi for gi prophylaxis  full code
53 year old male with a PMH of ETOH abuse s/p multiple hospitalizations (last Feb 2024) admitted to ICU for tx of:    Assessment   1. ETOH withdrawal with seizures  2. Hyperactive Delirium drug induced (ketamine) vs DT's    Plan  Arousable, overnight placed on precedex infusion  CIWA protocol, seizure precautions  Ativan IVP for now, assess pt response  Tapering Ativan dose  pt HDS and without respiratory distress  Oral diet   Thiamine and folate supplementation   sqh for dvt prophylaxis  ppi for gi prophylaxis  full code

## 2024-03-23 NOTE — PROGRESS NOTE ADULT - SUBJECTIVE AND OBJECTIVE BOX
Patient is a 53y old  Male who presents with a chief complaint of ETOH withdrawal Seizures (22 Mar 2024 11:19)      INTERVAL HPI:  OVERNIGHT EVENTS:  T(F): 97.9 (03-23-24 @ 05:24), Max: 99 (03-22-24 @ 16:00)  HR: 120 (03-23-24 @ 05:24) (84 - 126)  BP: 141/84 (03-23-24 @ 05:24) (131/93 - 143/99)  RR: 18 (03-23-24 @ 05:24) (12 - 23)  SpO2: 99% (03-23-24 @ 05:24) (98% - 100%)  I&O's Summary    22 Mar 2024 07:01  -  23 Mar 2024 07:00  --------------------------------------------------------  IN: 0 mL / OUT: 3 mL / NET: -3 mL          PHYSICAL EXAM:  GENERAL: NAD, well-groomed, well-developed  HEAD:  Atraumatic, Normocephalic  EYES: EOMI, PERRLA, conjunctiva and sclera clear  ENMT: No tonsillar erythema, exudates, or enlargement; Moist mucous membranes, Good dentition, No lesions  NECK: Supple, No JVD, Normal thyroid  NERVOUS SYSTEM:  Alert & Oriented X3, Good concentration; Motor Strength 5/5 B/L upper and lower extremities; DTRs 2+ intact and symmetric  CHEST/LUNG: Clear to percussion bilaterally; No rales, rhonchi, wheezing, or rubs  HEART: Regular rate and rhythm; No murmurs, rubs, or gallops  ABDOMEN: Soft, Nontender, Nondistended; Bowel sounds present  EXTREMITIES:  2+ Peripheral Pulses, No clubbing, cyanosis, or edema  LYMPH: No lymphadenopathy noted  SKIN: No rashes or lesions    LABS:                        13.4   5.34  )-----------( 127      ( 22 Mar 2024 05:00 )             38.1     03-22    133<L>  |  99  |  12  ----------------------------<  69<L>  4.0   |  24  |  0.60    Ca    8.8      22 Mar 2024 05:00  Phos  2.9     03-22  Mg     1.9     03-22    TPro  6.7  /  Alb  3.5  /  TBili  0.5  /  DBili  x   /  AST  409<H>  /  ALT  94<H>  /  AlkPhos  71  03-22      Urinalysis Basic - ( 22 Mar 2024 05:00 )    Color: x / Appearance: x / SG: x / pH: x  Gluc: 69 mg/dL / Ketone: x  / Bili: x / Urobili: x   Blood: x / Protein: x / Nitrite: x   Leuk Esterase: x / RBC: x / WBC x   Sq Epi: x / Non Sq Epi: x / Bacteria: x      CAPILLARY BLOOD GLUCOSE                  MEDICATIONS  (STANDING):  folic acid 1 milliGRAM(s) Oral daily  heparin   Injectable 5000 Unit(s) SubCutaneous every 8 hours  influenza   Vaccine 0.5 milliLiter(s) IntraMuscular once  LORazepam   Injectable   IV Push   LORazepam   Injectable 1 milliGRAM(s) IV Push every 12 hours  multivitamin 1 Tablet(s) Oral daily  nicotine - 21 mG/24Hr(s) Patch 1 Patch Transdermal daily  pantoprazole    Tablet 40 milliGRAM(s) Oral before breakfast  thiamine 100 milliGRAM(s) Oral daily    MEDICATIONS  (PRN):  LORazepam   Injectable 4 milliGRAM(s) IV Push every 1 hour PRN Symptom-triggered: each CIWA -Ar score 8 or GREATER       Patient is a 53y old  Male who presents with a chief complaint of ETOH withdrawal Seizures (22 Mar 2024 11:19)      INTERVAL HPI: Patient downgraded from ICU. Patient seen and examined at bedside.     OVERNIGHT EVENTS:  T(F): 97.9 (03-23-24 @ 05:24), Max: 99 (03-22-24 @ 16:00)  HR: 120 (03-23-24 @ 05:24) (84 - 126)  BP: 141/84 (03-23-24 @ 05:24) (131/93 - 143/99)  RR: 18 (03-23-24 @ 05:24) (12 - 23)  SpO2: 99% (03-23-24 @ 05:24) (98% - 100%)  I&O's Summary    22 Mar 2024 07:01  -  23 Mar 2024 07:00  --------------------------------------------------------  IN: 0 mL / OUT: 3 mL / NET: -3 mL          PHYSICAL EXAM:  GENERAL: Not in acute distress   HEAD:  Atraumatic, Normocephalic  EYES: Extra Ocular Movement intact  ENMT: Moist mucous membranes  NECK: Supple  NERVOUS SYSTEM:  Alert & Oriented X3  CHEST/LUNG: Clear to percussion bilaterally; No rales, rhonchi, wheezing, or rubs  HEART: Regular rate and rhythm; No murmurs, rubs, or gallops  ABDOMEN: Soft, Nontender, Nondistended; Bowel sounds present  EXTREMITIES:  2+ Peripheral Pulses, No clubbing, cyanosis, or edema  SKIN: No rashes or lesions    LABS:                        13.4   5.34  )-----------( 127      ( 22 Mar 2024 05:00 )             38.1     03-22    133<L>  |  99  |  12  ----------------------------<  69<L>  4.0   |  24  |  0.60    Ca    8.8      22 Mar 2024 05:00  Phos  2.9     03-22  Mg     1.9     03-22    TPro  6.7  /  Alb  3.5  /  TBili  0.5  /  DBili  x   /  AST  409<H>  /  ALT  94<H>  /  AlkPhos  71  03-22      Urinalysis Basic - ( 22 Mar 2024 05:00 )    Color: x / Appearance: x / SG: x / pH: x  Gluc: 69 mg/dL / Ketone: x  / Bili: x / Urobili: x   Blood: x / Protein: x / Nitrite: x   Leuk Esterase: x / RBC: x / WBC x   Sq Epi: x / Non Sq Epi: x / Bacteria: x      CAPILLARY BLOOD GLUCOSE      MEDICATIONS  (STANDING):  folic acid 1 milliGRAM(s) Oral daily  heparin   Injectable 5000 Unit(s) SubCutaneous every 8 hours  influenza   Vaccine 0.5 milliLiter(s) IntraMuscular once  LORazepam   Injectable   IV Push   LORazepam   Injectable 1 milliGRAM(s) IV Push every 12 hours  multivitamin 1 Tablet(s) Oral daily  nicotine - 21 mG/24Hr(s) Patch 1 Patch Transdermal daily  pantoprazole    Tablet 40 milliGRAM(s) Oral before breakfast  thiamine 100 milliGRAM(s) Oral daily    MEDICATIONS  (PRN):  LORazepam   Injectable 4 milliGRAM(s) IV Push every 1 hour PRN Symptom-triggered: each CIWA -Ar score 8 or GREATER

## 2024-03-23 NOTE — PROGRESS NOTE ADULT - REASON FOR ADMISSION
ETOH withdrawal Seizures

## 2024-03-23 NOTE — DISCHARGE NOTE PROVIDER - HOSPITAL COURSE
This is a 53 year old male who presented with chief complaint of ETOH withdrawal and seizures. Patient admitted to the ICU with acute withdrawal with tremors requiring a dexmedetomidine continuous infusion and an ativan bryan. Patient transferred to medicine unit with CIWA score of 1, where the ativan bryan would be continued.  Patient had elevated AST and ALT with need for further workup including labs and ultra sound. Patient wanted to leave AMA. Explained the risk of leaving early including risk of permanent disability or death. Patient refused to stay, and signed the AMA paperwork. Per day shift RN, IV was removed prior to patient leaving. This is a 53 year old male who presented with chief complaint of ETOH withdrawal and seizures. Patient admitted to the ICU with acute withdrawal with tremors requiring a dexmedetomidine continuous infusion and an ativan bryan. Patient transferred to medicine unit with CIWA score of 1, where the ativan bryan would be continued.  Patient had elevated AST and ALT with need for further workup including labs and ultra sound. Patient wanted to leave AMA. Explained the risk of leaving early including risk of permanent disability or death. Patient declined to stay, and signed the AMA paperwork. Per day shift RN, IV was removed prior to patient leaving. Pt reinforced to have close follow up with PMD and abstain from a substances that may cause dependence.

## 2024-05-29 ENCOUNTER — EMERGENCY (EMERGENCY)
Facility: HOSPITAL | Age: 54
LOS: 1 days | Discharge: HOME | End: 2024-05-29
Attending: EMERGENCY MEDICINE | Admitting: EMERGENCY MEDICINE
Payer: MEDICAID

## 2024-05-29 VITALS
RESPIRATION RATE: 18 BRPM | OXYGEN SATURATION: 95 % | DIASTOLIC BLOOD PRESSURE: 91 MMHG | WEIGHT: 139.99 LBS | HEART RATE: 121 BPM | TEMPERATURE: 99 F | HEIGHT: 69 IN | SYSTOLIC BLOOD PRESSURE: 143 MMHG

## 2024-05-29 LAB
ALBUMIN SERPL ELPH-MCNC: 4.5 G/DL — SIGNIFICANT CHANGE UP (ref 3.3–5)
ALP SERPL-CCNC: 56 U/L — SIGNIFICANT CHANGE UP (ref 40–120)
ALT FLD-CCNC: 57 U/L — HIGH (ref 10–45)
ANION GAP SERPL CALC-SCNC: 16 MMOL/L — SIGNIFICANT CHANGE UP (ref 5–17)
AST SERPL-CCNC: 61 U/L — HIGH (ref 10–40)
BASOPHILS # BLD AUTO: 0.09 K/UL — SIGNIFICANT CHANGE UP (ref 0–0.2)
BASOPHILS NFR BLD AUTO: 1.9 % — SIGNIFICANT CHANGE UP (ref 0–2)
BILIRUB SERPL-MCNC: 0.4 MG/DL — SIGNIFICANT CHANGE UP (ref 0.2–1.2)
BUN SERPL-MCNC: 10 MG/DL — SIGNIFICANT CHANGE UP (ref 7–23)
CALCIUM SERPL-MCNC: 9 MG/DL — SIGNIFICANT CHANGE UP (ref 8.4–10.5)
CHLORIDE SERPL-SCNC: 101 MMOL/L — SIGNIFICANT CHANGE UP (ref 96–108)
CO2 SERPL-SCNC: 24 MMOL/L — SIGNIFICANT CHANGE UP (ref 22–31)
CREAT SERPL-MCNC: 0.68 MG/DL — SIGNIFICANT CHANGE UP (ref 0.5–1.3)
EGFR: 112 ML/MIN/1.73M2 — SIGNIFICANT CHANGE UP
EOSINOPHIL # BLD AUTO: 0.12 K/UL — SIGNIFICANT CHANGE UP (ref 0–0.5)
EOSINOPHIL NFR BLD AUTO: 2.5 % — SIGNIFICANT CHANGE UP (ref 0–6)
ETHANOL SERPL-MCNC: 424 MG/DL — HIGH (ref 0–3)
GLUCOSE SERPL-MCNC: 80 MG/DL — SIGNIFICANT CHANGE UP (ref 70–99)
HCT VFR BLD CALC: 41.3 % — SIGNIFICANT CHANGE UP (ref 39–50)
HGB BLD-MCNC: 14.3 G/DL — SIGNIFICANT CHANGE UP (ref 13–17)
IMM GRANULOCYTES NFR BLD AUTO: 0.4 % — SIGNIFICANT CHANGE UP (ref 0–0.9)
LIDOCAIN IGE QN: 74 U/L — SIGNIFICANT CHANGE UP (ref 16–77)
LYMPHOCYTES # BLD AUTO: 2.39 K/UL — SIGNIFICANT CHANGE UP (ref 1–3.3)
LYMPHOCYTES # BLD AUTO: 50 % — HIGH (ref 13–44)
MCHC RBC-ENTMCNC: 34.6 GM/DL — SIGNIFICANT CHANGE UP (ref 32–36)
MCHC RBC-ENTMCNC: 34.7 PG — HIGH (ref 27–34)
MCV RBC AUTO: 100.2 FL — HIGH (ref 80–100)
MONOCYTES # BLD AUTO: 0.31 K/UL — SIGNIFICANT CHANGE UP (ref 0–0.9)
MONOCYTES NFR BLD AUTO: 6.5 % — SIGNIFICANT CHANGE UP (ref 2–14)
NEUTROPHILS # BLD AUTO: 1.85 K/UL — SIGNIFICANT CHANGE UP (ref 1.8–7.4)
NEUTROPHILS NFR BLD AUTO: 38.7 % — LOW (ref 43–77)
NRBC # BLD: 0 /100 WBCS — SIGNIFICANT CHANGE UP (ref 0–0)
PLATELET # BLD AUTO: 164 K/UL — SIGNIFICANT CHANGE UP (ref 150–400)
POTASSIUM SERPL-MCNC: 4.2 MMOL/L — SIGNIFICANT CHANGE UP (ref 3.5–5.3)
POTASSIUM SERPL-SCNC: 4.2 MMOL/L — SIGNIFICANT CHANGE UP (ref 3.5–5.3)
PROT SERPL-MCNC: 8.1 G/DL — SIGNIFICANT CHANGE UP (ref 6–8.3)
RBC # BLD: 4.12 M/UL — LOW (ref 4.2–5.8)
RBC # FLD: 12.5 % — SIGNIFICANT CHANGE UP (ref 10.3–14.5)
SODIUM SERPL-SCNC: 141 MMOL/L — SIGNIFICANT CHANGE UP (ref 135–145)
WBC # BLD: 4.78 K/UL — SIGNIFICANT CHANGE UP (ref 3.8–10.5)
WBC # FLD AUTO: 4.78 K/UL — SIGNIFICANT CHANGE UP (ref 3.8–10.5)

## 2024-05-29 PROCEDURE — 93010 ELECTROCARDIOGRAM REPORT: CPT

## 2024-05-29 PROCEDURE — 99285 EMERGENCY DEPT VISIT HI MDM: CPT

## 2024-05-29 RX ORDER — THIAMINE MONONITRATE (VIT B1) 100 MG
100 TABLET ORAL ONCE
Refills: 0 | Status: COMPLETED | OUTPATIENT
Start: 2024-05-29 | End: 2024-05-29

## 2024-05-29 RX ORDER — SODIUM CHLORIDE 9 MG/ML
2000 INJECTION INTRAMUSCULAR; INTRAVENOUS; SUBCUTANEOUS ONCE
Refills: 0 | Status: COMPLETED | OUTPATIENT
Start: 2024-05-29 | End: 2024-05-29

## 2024-05-29 RX ORDER — FOLIC ACID 0.8 MG
1 TABLET ORAL ONCE
Refills: 0 | Status: COMPLETED | OUTPATIENT
Start: 2024-05-29 | End: 2024-05-29

## 2024-05-29 RX ADMIN — Medication 1 TABLET(S): at 22:45

## 2024-05-29 RX ADMIN — Medication 100 MILLIGRAM(S): at 22:46

## 2024-05-29 RX ADMIN — Medication 2 MILLIGRAM(S): at 22:46

## 2024-05-29 RX ADMIN — Medication 1 MILLIGRAM(S): at 22:46

## 2024-05-29 RX ADMIN — SODIUM CHLORIDE 2000 MILLILITER(S): 9 INJECTION INTRAMUSCULAR; INTRAVENOUS; SUBCUTANEOUS at 22:46

## 2024-05-29 NOTE — ED PROVIDER NOTE - CLINICAL SUMMARY MEDICAL DECISION MAKING FREE TEXT BOX
53-year-old male with history of alcohol abuse, alcohol withdrawal, withdrawal seizures, requesting detox.  Patient states he drinks 3 extra-large beers every day and woke with sweats and feeling shaky.  Last drink was about 4 hours ago.  Denies other illegal drug use.  No chest pain or shortness of breath.  Associated nausea and some vomiting, nonbloody, no coffee-ground like emesis.  No diarrhea.  No black or bloody stools.  No SI HI hallucinations.    Patient in no distress but appears slightly tremulous, with heart rate 121.  Alcohol odor on breath.  Partial DDx: Early alcohol withdrawal, alcohol intoxication, gastritis, other drug intoxication.  Check labs, EKG.  Give IV fluids, Ativan.  Reassess 53-year-old male with history of alcohol abuse, alcohol withdrawal, withdrawal seizures, requesting detox.  Patient states he drinks 3 extra-large beers every day and woke with sweats and feeling shaky.  Last drink was about 4 hours ago.  Denies other illegal drug use.  No chest pain or shortness of breath.  Associated nausea and some vomiting, nonbloody, no coffee-ground like emesis.  No diarrhea.  No black or bloody stools.  No SI HI hallucinations.    Patient in no distress but appears slightly tremulous, with heart rate 121.  Alcohol odor on breath.  Partial DDx: Early alcohol withdrawal, alcohol intoxication, gastritis, other drug intoxication.  Check labs, EKG.  Give IV fluids, Ativan.  Reassess    Update: Labs unremarkable except for alcohol 424.  Patient slept few hours .  03 50 patient awake alert, no tremors, no nausea vomiting ED.  Vitals normal.  No signs of withdrawal currently.  Will prescribe Librium.  Recommend outpatient detox/rehab.  Patient currently well-appearing, has steady gait, is coherent, oriented x 3. list of detox/rehab resources given to pt

## 2024-05-29 NOTE — ED PROVIDER NOTE - PATIENT PORTAL LINK FT
You can access the FollowMyHealth Patient Portal offered by [facility] by registering at the following website: [email or web address]. By joining [facility]’s FollowMyHealth portal, you will also be able to view your health information using other applications (apps) compatible with our system.

## 2024-05-29 NOTE — ED PROVIDER NOTE - NSCAREINITIATED _GEN_ER
David Henriquez(Attending) [facility]an [facility]:  I discussed with the patient the risks of Rituxan infusions. Side effects can include infusion reactions, severe drug rashes including mucocutaneous reactions, reactivation of latent hepatitis and other infections and rarely progressive multifocal leukoencephalopathy.  All of the patient's questions and concerns were addressed.

## 2024-05-29 NOTE — ED PROVIDER NOTE - PHYSICAL EXAMINATION
exam:   General: Mildly tremulous, anxious, in no distress.  Alcoholic odor on breath  HEENT: eyes perrl, nose normal, OP no erythema/exudate/swelling.   cor: RRR, s1s2, 2+rad pulses.   lungs: ctabl, no resp distress.   abd: soft, ntnd.   neuro: a&ox3, cn2-12 intact, MOLINA, 5/5 strength c nl sensation all extremities, nl coordination.   MSK: no extremity swelling.  Skin: normal, no rash  Psych: No SI HI hallucinations

## 2024-05-29 NOTE — ED PROVIDER NOTE - OBJECTIVE STATEMENT
53-year-old male with history of alcohol abuse, alcohol withdrawal, withdrawal seizures, requesting detox.  Patient states he drinks 3 extra-large beers every day and woke with sweats and feeling shaky.  Last drink was about 4 hours ago.  Denies other illegal drug use.  No chest pain or shortness of breath.  Associated nausea and some vomiting, nonbloody, no coffee-ground like emesis.  No diarrhea.  No black or bloody stools.  No SI HI hallucinations.

## 2024-05-29 NOTE — ED ADULT NURSE NOTE - NSFALLUNIVINTERV_ED_ALL_ED
Bed/Stretcher in lowest position, wheels locked, appropriate side rails in place/Call bell, personal items and telephone in reach/Instruct patient to call for assistance before getting out of bed/chair/stretcher/Non-slip footwear applied when patient is off stretcher/[place] to call system/Physically safe environment - no spills, clutter or unnecessary equipment/Purposeful proactive rounding/Room/bathroom lighting operational, light cord in reach

## 2024-05-29 NOTE — ED PROVIDER NOTE - NSFOLLOWUPCLINICS_GEN_ALL_ED_FT
[facility]  [address] [place] [address] [place] [address]  Phone: [phone]  Fax: [phone]  Follow Up Time: 1-3 Days

## 2024-05-29 NOTE — ED PROVIDER NOTE - NSFOLLOWUPINSTRUCTIONS_ED_ALL_ED_FT
Take Librium as directed as needed for alcohol withdrawal symptoms and to wean off alcohol  -Go to alcohol detox/rehab program    Follow Up in 1-3 Days with your own doctor or with  [address] [address]  Phone: [phone]    Alcohol Intoxication    WHAT YOU NEED TO KNOW:    Alcohol intoxication is a harmful physical condition caused when you drink more alcohol than your body can handle. It is also called ethanol poisoning, or being drunk.    DISCHARGE INSTRUCTIONS:    Call your local emergency number (911 in the ) if:     You have sudden trouble breathing or chest pain.    You have a seizure.    You feel sad enough to harm yourself or others.    Call your doctor if:     You have hallucinations (you see or hear things that are not real).    You cannot stop vomiting.      You have questions or concerns about your condition or care.    Recommended alcohol limits:     Men 21 to 64 years should limit alcohol to 2 drinks a day. Do not have more than 4 drinks in 1 day or more than 14 in 1 week.    All women, and men 65 or older should limit alcohol to 1 drink in a day. Do not have more than 3 drinks in 1 day or more than 7 in 1 week. No amount of alcohol is okay during pregnancy.    Manage alcohol use:     Decrease the amount you drink. This can help prevent health problems such as brain, heart, and liver damage, high blood pressure, diabetes, and cancer. If you cannot stop completely, healthcare providers can help you set goals to decrease the amount you drink.    Plan weekly alcohol use. You will be less likely to drink more than the recommended limit if you plan ahead.    Have food when you drink alcohol. Food will prevent alcohol from getting into your system too quickly. Eat before you have your first alcohol drink.    Time your drinks carefully. Have no more than 1 drink in an hour. Have a liquid such as water, coffee, or a soft drink between alcohol drinks.    Do not drive if you have had alcohol. Make sure someone who has not been drinking can help you get home.    Do not drink alcohol if you are taking medicine. Alcohol is dangerous when you combine it with certain medicines, such as acetaminophen or blood pressure medicine. Talk to your healthcare provider about all the medicines you currently take.    For more information:     Alcoholics Anonymous  Web Address: http://www.aa.org    Substance Abuse and Mental Health Services Administration  [address]  [place] [address]  Web Address: [email or web address]    Follow up with your healthcare provider as directed: Write down your questions so you remember to ask them during your visits.

## 2024-05-29 NOTE — ED ADULT NURSE NOTE - OBJECTIVE STATEMENT
Pt axo3, requesting detox. states his last drink was 5 hours ago. pt states he drinks several beers everyday. pt denies headache, chest pain, sob, vomiting, tremors, or hallucinations. reports nausea and anxiety. safety maintained.

## 2024-05-30 VITALS
SYSTOLIC BLOOD PRESSURE: 120 MMHG | HEART RATE: 84 BPM | DIASTOLIC BLOOD PRESSURE: 87 MMHG | OXYGEN SATURATION: 97 % | RESPIRATION RATE: 18 BRPM

## 2024-05-30 PROCEDURE — 80053 COMPREHEN METABOLIC PANEL: CPT

## 2024-05-30 PROCEDURE — 36415 COLL VENOUS BLD VENIPUNCTURE: CPT

## 2024-05-30 PROCEDURE — 99284 EMERGENCY DEPT VISIT MOD MDM: CPT | Mod: 25

## 2024-05-30 PROCEDURE — 93005 ELECTROCARDIOGRAM TRACING: CPT

## 2024-05-30 PROCEDURE — 80307 DRUG TEST PRSMV CHEM ANLYZR: CPT

## 2024-05-30 PROCEDURE — 85025 COMPLETE CBC W/AUTO DIFF WBC: CPT

## 2024-05-30 PROCEDURE — 96374 THER/PROPH/DIAG INJ IV PUSH: CPT

## 2024-05-30 PROCEDURE — 83690 ASSAY OF LIPASE: CPT

## 2024-05-30 RX ADMIN — Medication 2 MILLIGRAM(S): at 04:18

## 2024-05-31 DIAGNOSIS — F10.10 ALCOHOL ABUSE, UNCOMPLICATED: ICD-10-CM

## 2024-05-31 DIAGNOSIS — R11.2 NAUSEA WITH VOMITING, UNSPECIFIED: ICD-10-CM
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
## 2024-07-02 ENCOUNTER — EMERGENCY (EMERGENCY)
Facility: HOSPITAL | Age: 54
LOS: 1 days | Discharge: HOME | End: 2024-07-02
Attending: STUDENT IN AN ORGANIZED HEALTH CARE EDUCATION/TRAINING PROGRAM | Admitting: STUDENT IN AN ORGANIZED HEALTH CARE EDUCATION/TRAINING PROGRAM
Payer: MEDICAID

## 2024-07-02 VITALS
RESPIRATION RATE: 18 BRPM | SYSTOLIC BLOOD PRESSURE: 133 MMHG | DIASTOLIC BLOOD PRESSURE: 95 MMHG | HEART RATE: 110 BPM | HEIGHT: 69 IN | WEIGHT: 147.93 LBS | OXYGEN SATURATION: 95 % | TEMPERATURE: 98 F

## 2024-07-02 LAB
ALBUMIN SERPL ELPH-MCNC: 4.6 G/DL — SIGNIFICANT CHANGE UP (ref 3.3–5)
ALP SERPL-CCNC: 56 U/L — SIGNIFICANT CHANGE UP (ref 40–120)
ALT FLD-CCNC: 58 U/L — HIGH (ref 10–45)
ANION GAP SERPL CALC-SCNC: 16 MMOL/L — SIGNIFICANT CHANGE UP (ref 5–17)
APAP SERPL-MCNC: <1 UG/ML — LOW (ref 10–30)
APPEARANCE UR: ABNORMAL
AST SERPL-CCNC: 76 U/L — HIGH (ref 10–40)
BACTERIA # UR AUTO: ABNORMAL /HPF
BASOPHILS # BLD AUTO: 0.09 K/UL — SIGNIFICANT CHANGE UP (ref 0–0.2)
BASOPHILS NFR BLD AUTO: 1.6 % — SIGNIFICANT CHANGE UP (ref 0–2)
BILIRUB SERPL-MCNC: 0.4 MG/DL — SIGNIFICANT CHANGE UP (ref 0.2–1.2)
BILIRUB UR-MCNC: NEGATIVE — SIGNIFICANT CHANGE UP
BUN SERPL-MCNC: 8 MG/DL — SIGNIFICANT CHANGE UP (ref 7–23)
CALCIUM SERPL-MCNC: 9 MG/DL — SIGNIFICANT CHANGE UP (ref 8.4–10.5)
CHLORIDE SERPL-SCNC: 102 MMOL/L — SIGNIFICANT CHANGE UP (ref 96–108)
CO2 SERPL-SCNC: 24 MMOL/L — SIGNIFICANT CHANGE UP (ref 22–31)
COLOR SPEC: SIGNIFICANT CHANGE UP
CREAT SERPL-MCNC: 0.89 MG/DL — SIGNIFICANT CHANGE UP (ref 0.5–1.3)
DIFF PNL FLD: ABNORMAL
EGFR: 103 ML/MIN/1.73M2 — SIGNIFICANT CHANGE UP
EOSINOPHIL # BLD AUTO: 0.3 K/UL — SIGNIFICANT CHANGE UP (ref 0–0.5)
EOSINOPHIL NFR BLD AUTO: 5.3 % — SIGNIFICANT CHANGE UP (ref 0–6)
EPI CELLS # UR: 6 — SIGNIFICANT CHANGE UP
ETHANOL SERPL-MCNC: 376 MG/DL — HIGH (ref 0–3)
FLUAV AG NPH QL: SIGNIFICANT CHANGE UP
FLUBV AG NPH QL: SIGNIFICANT CHANGE UP
GLUCOSE SERPL-MCNC: 101 MG/DL — HIGH (ref 70–99)
GLUCOSE UR QL: NEGATIVE MG/DL — SIGNIFICANT CHANGE UP
HCT VFR BLD CALC: 40.3 % — SIGNIFICANT CHANGE UP (ref 39–50)
HGB BLD-MCNC: 14.2 G/DL — SIGNIFICANT CHANGE UP (ref 13–17)
IMM GRANULOCYTES NFR BLD AUTO: 0.2 % — SIGNIFICANT CHANGE UP (ref 0–0.9)
KETONES UR-MCNC: 15 MG/DL
LEUKOCYTE ESTERASE UR-ACNC: ABNORMAL
LYMPHOCYTES # BLD AUTO: 1.97 K/UL — SIGNIFICANT CHANGE UP (ref 1–3.3)
LYMPHOCYTES # BLD AUTO: 34.7 % — SIGNIFICANT CHANGE UP (ref 13–44)
MCHC RBC-ENTMCNC: 34.2 PG — HIGH (ref 27–34)
MCHC RBC-ENTMCNC: 35.2 GM/DL — SIGNIFICANT CHANGE UP (ref 32–36)
MCV RBC AUTO: 97.1 FL — SIGNIFICANT CHANGE UP (ref 80–100)
MONOCYTES # BLD AUTO: 0.25 K/UL — SIGNIFICANT CHANGE UP (ref 0–0.9)
MONOCYTES NFR BLD AUTO: 4.4 % — SIGNIFICANT CHANGE UP (ref 2–14)
NEUTROPHILS # BLD AUTO: 3.05 K/UL — SIGNIFICANT CHANGE UP (ref 1.8–7.4)
NEUTROPHILS NFR BLD AUTO: 53.8 % — SIGNIFICANT CHANGE UP (ref 43–77)
NITRITE UR-MCNC: POSITIVE
NRBC # BLD: 0 /100 WBCS — SIGNIFICANT CHANGE UP (ref 0–0)
PH UR: 5.5 — SIGNIFICANT CHANGE UP (ref 5–8)
PLATELET # BLD AUTO: 219 K/UL — SIGNIFICANT CHANGE UP (ref 150–400)
POTASSIUM SERPL-MCNC: 4 MMOL/L — SIGNIFICANT CHANGE UP (ref 3.5–5.3)
POTASSIUM SERPL-SCNC: 4 MMOL/L — SIGNIFICANT CHANGE UP (ref 3.5–5.3)
PROT SERPL-MCNC: 8.1 G/DL — SIGNIFICANT CHANGE UP (ref 6–8.3)
PROT UR-MCNC: 300 MG/DL
RBC # BLD: 4.15 M/UL — LOW (ref 4.2–5.8)
RBC # FLD: 13 % — SIGNIFICANT CHANGE UP (ref 10.3–14.5)
RBC CASTS # UR COMP ASSIST: ABNORMAL /HPF
RSV RNA NPH QL NAA+NON-PROBE: SIGNIFICANT CHANGE UP
SALICYLATES SERPL-MCNC: 2.7 MG/DL — LOW (ref 3–30)
SARS-COV-2 RNA SPEC QL NAA+PROBE: SIGNIFICANT CHANGE UP
SODIUM SERPL-SCNC: 142 MMOL/L — SIGNIFICANT CHANGE UP (ref 135–145)
SP GR SPEC: 1.01 — SIGNIFICANT CHANGE UP (ref 1–1.03)
TSH SERPL-MCNC: 1.06 UIU/ML — SIGNIFICANT CHANGE UP (ref 0.36–3.74)
UROBILINOGEN FLD QL: 1 MG/DL — SIGNIFICANT CHANGE UP (ref 0.2–1)
WBC # BLD: 5.67 K/UL — SIGNIFICANT CHANGE UP (ref 3.8–10.5)
WBC # FLD AUTO: 5.67 K/UL — SIGNIFICANT CHANGE UP (ref 3.8–10.5)
WBC UR QL: >50 /HPF — HIGH (ref 0–5)

## 2024-07-02 PROCEDURE — 96372 THER/PROPH/DIAG INJ SC/IM: CPT

## 2024-07-02 PROCEDURE — 99285 EMERGENCY DEPT VISIT HI MDM: CPT

## 2024-07-02 PROCEDURE — 93010 ELECTROCARDIOGRAM REPORT: CPT

## 2024-07-02 PROCEDURE — 87637 SARSCOV2&INF A&B&RSV AMP PRB: CPT

## 2024-07-02 PROCEDURE — 36415 COLL VENOUS BLD VENIPUNCTURE: CPT

## 2024-07-02 PROCEDURE — 80307 DRUG TEST PRSMV CHEM ANLYZR: CPT

## 2024-07-02 PROCEDURE — 93005 ELECTROCARDIOGRAM TRACING: CPT

## 2024-07-02 PROCEDURE — 80053 COMPREHEN METABOLIC PANEL: CPT

## 2024-07-02 PROCEDURE — 99285 EMERGENCY DEPT VISIT HI MDM: CPT | Mod: 25

## 2024-07-02 PROCEDURE — 85025 COMPLETE CBC W/AUTO DIFF WBC: CPT

## 2024-07-02 PROCEDURE — 84443 ASSAY THYROID STIM HORMONE: CPT

## 2024-07-02 PROCEDURE — 81001 URINALYSIS AUTO W/SCOPE: CPT

## 2024-07-02 RX ORDER — LORAZEPAM 0.5 MG
2 TABLET ORAL ONCE
Refills: 0 | Status: DISCONTINUED | OUTPATIENT
Start: 2024-07-02 | End: 2024-07-02

## 2024-07-02 RX ORDER — HALOPERIDOL DECANOATE 100 MG/ML
5 VIAL (ML) INTRAMUSCULAR ONCE
Refills: 0 | Status: COMPLETED | OUTPATIENT
Start: 2024-07-02 | End: 2024-07-02

## 2024-07-02 RX ADMIN — Medication 5 MILLIGRAM(S): at 20:51

## 2024-07-02 RX ADMIN — Medication 2 MILLIGRAM(S): at 20:51

## 2024-07-03 VITALS
RESPIRATION RATE: 18 BRPM | DIASTOLIC BLOOD PRESSURE: 88 MMHG | SYSTOLIC BLOOD PRESSURE: 146 MMHG | OXYGEN SATURATION: 99 % | TEMPERATURE: 98 F | HEART RATE: 93 BPM

## 2024-09-16 ENCOUNTER — INPATIENT (INPATIENT)
Facility: HOSPITAL | Age: 54
LOS: 4 days | Discharge: HOME | DRG: 897 | End: 2024-09-21
Attending: STUDENT IN AN ORGANIZED HEALTH CARE EDUCATION/TRAINING PROGRAM | Admitting: INTERNAL MEDICINE
Payer: MEDICAID

## 2024-09-16 VITALS
RESPIRATION RATE: 18 BRPM | HEIGHT: 70 IN | SYSTOLIC BLOOD PRESSURE: 133 MMHG | TEMPERATURE: 98 F | OXYGEN SATURATION: 99 % | DIASTOLIC BLOOD PRESSURE: 70 MMHG | WEIGHT: 145.06 LBS | HEART RATE: 89 BPM

## 2024-09-16 DIAGNOSIS — K38.9 DISEASE OF APPENDIX, UNSPECIFIED: Chronic | ICD-10-CM

## 2024-09-16 DIAGNOSIS — F10.939 ALCOHOL USE, UNSPECIFIED WITH WITHDRAWAL, UNSPECIFIED: ICD-10-CM

## 2024-09-16 DIAGNOSIS — R56.9 UNSPECIFIED CONVULSIONS: ICD-10-CM

## 2024-09-16 DIAGNOSIS — R74.01 ELEVATION OF LEVELS OF LIVER TRANSAMINASE LEVELS: ICD-10-CM

## 2024-09-16 DIAGNOSIS — F10.10 ALCOHOL ABUSE, UNCOMPLICATED: ICD-10-CM

## 2024-09-16 DIAGNOSIS — D69.6 THROMBOCYTOPENIA, UNSPECIFIED: ICD-10-CM

## 2024-09-16 DIAGNOSIS — E87.20 ACIDOSIS, UNSPECIFIED: ICD-10-CM
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
## 2024-09-16 DIAGNOSIS — Z29.9 ENCOUNTER FOR PROPHYLACTIC MEASURES, UNSPECIFIED: ICD-10-CM

## 2024-09-16 LAB
ALBUMIN SERPL ELPH-MCNC: 3.7 G/DL — SIGNIFICANT CHANGE UP (ref 3.3–5)
ALBUMIN SERPL ELPH-MCNC: 4.2 G/DL — SIGNIFICANT CHANGE UP (ref 3.3–5)
ALP SERPL-CCNC: 53 U/L — SIGNIFICANT CHANGE UP (ref 40–120)
ALP SERPL-CCNC: 62 U/L — SIGNIFICANT CHANGE UP (ref 40–120)
ALT FLD-CCNC: 101 U/L — HIGH (ref 12–78)
ALT FLD-CCNC: 120 U/L — HIGH (ref 12–78)
ANION GAP SERPL CALC-SCNC: 10 MMOL/L — SIGNIFICANT CHANGE UP (ref 5–17)
ANION GAP SERPL CALC-SCNC: 17 MMOL/L — SIGNIFICANT CHANGE UP (ref 5–17)
APTT BLD: 22.8 SEC — LOW (ref 24.5–35.6)
AST SERPL-CCNC: 110 U/L — HIGH (ref 15–37)
AST SERPL-CCNC: 167 U/L — HIGH (ref 15–37)
BASOPHILS # BLD AUTO: 0.06 K/UL — SIGNIFICANT CHANGE UP (ref 0–0.2)
BASOPHILS NFR BLD AUTO: 1.1 % — SIGNIFICANT CHANGE UP (ref 0–2)
BILIRUB DIRECT SERPL-MCNC: 0.2 MG/DL — SIGNIFICANT CHANGE UP (ref 0–0.3)
BILIRUB INDIRECT FLD-MCNC: 0.4 MG/DL — SIGNIFICANT CHANGE UP (ref 0.2–1)
BILIRUB SERPL-MCNC: 0.6 MG/DL — SIGNIFICANT CHANGE UP (ref 0.2–1.2)
BILIRUB SERPL-MCNC: 0.6 MG/DL — SIGNIFICANT CHANGE UP (ref 0.2–1.2)
BUN SERPL-MCNC: 7 MG/DL — SIGNIFICANT CHANGE UP (ref 7–23)
BUN SERPL-MCNC: 8 MG/DL — SIGNIFICANT CHANGE UP (ref 7–23)
CALCIUM SERPL-MCNC: 8 MG/DL — LOW (ref 8.5–10.1)
CALCIUM SERPL-MCNC: 8.6 MG/DL — SIGNIFICANT CHANGE UP (ref 8.5–10.1)
CHLORIDE SERPL-SCNC: 102 MMOL/L — SIGNIFICANT CHANGE UP (ref 96–108)
CHLORIDE SERPL-SCNC: 105 MMOL/L — SIGNIFICANT CHANGE UP (ref 96–108)
CO2 SERPL-SCNC: 20 MMOL/L — LOW (ref 22–31)
CO2 SERPL-SCNC: 25 MMOL/L — SIGNIFICANT CHANGE UP (ref 22–31)
CREAT SERPL-MCNC: 0.69 MG/DL — SIGNIFICANT CHANGE UP (ref 0.5–1.3)
CREAT SERPL-MCNC: 0.71 MG/DL — SIGNIFICANT CHANGE UP (ref 0.5–1.3)
EGFR: 110 ML/MIN/1.73M2 — SIGNIFICANT CHANGE UP
EGFR: 110 ML/MIN/1.73M2 — SIGNIFICANT CHANGE UP
EGFR: 111 ML/MIN/1.73M2 — SIGNIFICANT CHANGE UP
EGFR: 111 ML/MIN/1.73M2 — SIGNIFICANT CHANGE UP
EOSINOPHIL # BLD AUTO: 0.01 K/UL — SIGNIFICANT CHANGE UP (ref 0–0.5)
EOSINOPHIL NFR BLD AUTO: 0.2 % — SIGNIFICANT CHANGE UP (ref 0–6)
ETHANOL SERPL-MCNC: 368 MG/DL — HIGH (ref 0–10)
GLUCOSE SERPL-MCNC: 106 MG/DL — HIGH (ref 70–99)
GLUCOSE SERPL-MCNC: 141 MG/DL — HIGH (ref 70–99)
HAV IGM SER-ACNC: SIGNIFICANT CHANGE UP
HBV CORE IGM SER-ACNC: SIGNIFICANT CHANGE UP
HBV SURFACE AG SER-ACNC: SIGNIFICANT CHANGE UP
HCT VFR BLD CALC: 38.5 % — LOW (ref 39–50)
HCV AB S/CO SERPL IA: 0.1 S/CO — SIGNIFICANT CHANGE UP (ref 0–0.99)
HCV AB SERPL-IMP: SIGNIFICANT CHANGE UP
HGB BLD-MCNC: 13.1 G/DL — SIGNIFICANT CHANGE UP (ref 13–17)
IMM GRANULOCYTES NFR BLD AUTO: 0.2 % — SIGNIFICANT CHANGE UP (ref 0–0.9)
INR BLD: 0.95 RATIO — SIGNIFICANT CHANGE UP (ref 0.85–1.18)
LACTATE SERPL-SCNC: 2.2 MMOL/L — HIGH (ref 0.7–2)
LACTATE SERPL-SCNC: 3.8 MMOL/L — HIGH (ref 0.7–2)
LYMPHOCYTES # BLD AUTO: 1.13 K/UL — SIGNIFICANT CHANGE UP (ref 1–3.3)
LYMPHOCYTES # BLD AUTO: 20 % — SIGNIFICANT CHANGE UP (ref 13–44)
MAGNESIUM SERPL-MCNC: 1.8 MG/DL — SIGNIFICANT CHANGE UP (ref 1.6–2.6)
MCHC RBC-ENTMCNC: 33.2 PG — SIGNIFICANT CHANGE UP (ref 27–34)
MCHC RBC-ENTMCNC: 34 GM/DL — SIGNIFICANT CHANGE UP (ref 32–36)
MCV RBC AUTO: 97.5 FL — SIGNIFICANT CHANGE UP (ref 80–100)
MONOCYTES # BLD AUTO: 0.43 K/UL — SIGNIFICANT CHANGE UP (ref 0–0.9)
MONOCYTES NFR BLD AUTO: 7.6 % — SIGNIFICANT CHANGE UP (ref 2–14)
NEUTROPHILS # BLD AUTO: 4 K/UL — SIGNIFICANT CHANGE UP (ref 1.8–7.4)
NEUTROPHILS NFR BLD AUTO: 70.9 % — SIGNIFICANT CHANGE UP (ref 43–77)
NRBC # BLD: 0 /100 WBCS — SIGNIFICANT CHANGE UP (ref 0–0)
NRBC BLD-RTO: 0 /100 WBCS — SIGNIFICANT CHANGE UP (ref 0–0)
PCP SPEC-MCNC: SIGNIFICANT CHANGE UP
PHOSPHATE SERPL-MCNC: 3.4 MG/DL — SIGNIFICANT CHANGE UP (ref 2.5–4.5)
PLATELET # BLD AUTO: 123 K/UL — LOW (ref 150–400)
POTASSIUM SERPL-MCNC: 3.5 MMOL/L — SIGNIFICANT CHANGE UP (ref 3.5–5.3)
POTASSIUM SERPL-MCNC: 4.1 MMOL/L — SIGNIFICANT CHANGE UP (ref 3.5–5.3)
POTASSIUM SERPL-SCNC: 3.5 MMOL/L — SIGNIFICANT CHANGE UP (ref 3.5–5.3)
POTASSIUM SERPL-SCNC: 4.1 MMOL/L — SIGNIFICANT CHANGE UP (ref 3.5–5.3)
PROT SERPL-MCNC: 6.6 G/DL — SIGNIFICANT CHANGE UP (ref 6–8.3)
PROT SERPL-MCNC: 7.7 G/DL — SIGNIFICANT CHANGE UP (ref 6–8.3)
PROTHROM AB SERPL-ACNC: 10.8 SEC — SIGNIFICANT CHANGE UP (ref 9.5–13)
RBC # BLD: 3.95 M/UL — LOW (ref 4.2–5.8)
RBC # FLD: 12.2 % — SIGNIFICANT CHANGE UP (ref 10.3–14.5)
SODIUM SERPL-SCNC: 139 MMOL/L — SIGNIFICANT CHANGE UP (ref 135–145)
SODIUM SERPL-SCNC: 140 MMOL/L — SIGNIFICANT CHANGE UP (ref 135–145)
WBC # BLD: 5.64 K/UL — SIGNIFICANT CHANGE UP (ref 3.8–10.5)
WBC # FLD AUTO: 5.64 K/UL — SIGNIFICANT CHANGE UP (ref 3.8–10.5)

## 2024-09-16 PROCEDURE — 99233 SBSQ HOSP IP/OBS HIGH 50: CPT | Mod: GC

## 2024-09-16 PROCEDURE — 71045 X-RAY EXAM CHEST 1 VIEW: CPT | Mod: 26

## 2024-09-16 PROCEDURE — 70450 CT HEAD/BRAIN W/O DYE: CPT | Mod: 26,MC

## 2024-09-16 PROCEDURE — 76700 US EXAM ABDOM COMPLETE: CPT | Mod: 26

## 2024-09-16 PROCEDURE — 99222 1ST HOSP IP/OBS MODERATE 55: CPT | Mod: GC

## 2024-09-16 PROCEDURE — 93010 ELECTROCARDIOGRAM REPORT: CPT

## 2024-09-16 PROCEDURE — 99285 EMERGENCY DEPT VISIT HI MDM: CPT

## 2024-09-16 RX ORDER — CHLORDIAZEPOXIDE HCL 10 MG
50 CAPSULE ORAL ONCE
Refills: 0 | Status: DISCONTINUED | OUTPATIENT
Start: 2024-09-16 | End: 2024-09-16

## 2024-09-16 RX ORDER — ONDANSETRON HCL/PF 4 MG/2 ML
4 VIAL (ML) INJECTION ONCE
Refills: 0 | Status: COMPLETED | OUTPATIENT
Start: 2024-09-16 | End: 2024-09-16

## 2024-09-16 RX ORDER — SODIUM CHLORIDE 9 G/1000ML
500 INJECTION, SOLUTION INTRAVENOUS
Refills: 0 | Status: DISCONTINUED | OUTPATIENT
Start: 2024-09-16 | End: 2024-09-17

## 2024-09-16 RX ORDER — LORAZEPAM 4 MG/ML
4 VIAL (ML) INJECTION EVERY 4 HOURS
Refills: 0 | Status: DISCONTINUED | OUTPATIENT
Start: 2024-09-16 | End: 2024-09-17

## 2024-09-16 RX ORDER — LORAZEPAM 4 MG/ML
3 VIAL (ML) INJECTION EVERY 4 HOURS
Refills: 0 | Status: DISCONTINUED | OUTPATIENT
Start: 2024-09-17 | End: 2024-09-17

## 2024-09-16 RX ORDER — FOLIC ACID 1 MG/1
1 TABLET ORAL DAILY
Refills: 0 | Status: DISCONTINUED | OUTPATIENT
Start: 2024-09-16 | End: 2024-09-21

## 2024-09-16 RX ORDER — CIPROFLOXACIN HCL 250 MG
500 TABLET ORAL EVERY 12 HOURS
Refills: 0 | Status: DISCONTINUED | OUTPATIENT
Start: 2024-09-16 | End: 2024-09-16

## 2024-09-16 RX ORDER — DIAZEPAM 5 MG/1
10 TABLET ORAL EVERY 6 HOURS
Refills: 0 | Status: DISCONTINUED | OUTPATIENT
Start: 2024-09-16 | End: 2024-09-16

## 2024-09-16 RX ORDER — LORAZEPAM 4 MG/ML
1 VIAL (ML) INJECTION ONCE
Refills: 0 | Status: DISCONTINUED | OUTPATIENT
Start: 2024-09-16 | End: 2024-09-16

## 2024-09-16 RX ORDER — INFLUENZA A VIRUS A/IDAHO/07/2018 (H1N1) ANTIGEN (MDCK CELL DERIVED, PROPIOLACTONE INACTIVATED, INFLUENZA A VIRUS A/INDIANA/08/2018 (H3N2) ANTIGEN (MDCK CELL DERIVED, PROPIOLACTONE INACTIVATED), INFLUENZA B VIRUS B/SINGAPORE/INFTT-16-0610/2016 ANTIGEN (MDCK CELL DERIVED, PROPIOLACTONE INACTIVATED), INFLUENZA B VIRUS B/IOWA/06/2017 ANTIGEN (MDCK CELL DERIVED, PROPIOLACTONE INACTIVATED) 15; 15; 15; 15 UG/.5ML; UG/.5ML; UG/.5ML; UG/.5ML
0.5 INJECTION, SUSPENSION INTRAMUSCULAR ONCE
Refills: 0 | Status: DISCONTINUED | OUTPATIENT
Start: 2024-09-16 | End: 2024-09-21

## 2024-09-16 RX ORDER — B1/B2/B3/B5/B6/B12/VIT C/FOLIC 500-0.5 MG
1 TABLET ORAL DAILY
Refills: 0 | Status: DISCONTINUED | OUTPATIENT
Start: 2024-09-16 | End: 2024-09-21

## 2024-09-16 RX ORDER — DIAZEPAM 5 MG/1
20 TABLET ORAL EVERY 4 HOURS
Refills: 0 | Status: DISCONTINUED | OUTPATIENT
Start: 2024-09-16 | End: 2024-09-17

## 2024-09-16 RX ORDER — LORAZEPAM 4 MG/ML
VIAL (ML) INJECTION
Refills: 0 | Status: DISCONTINUED | OUTPATIENT
Start: 2024-09-16 | End: 2024-09-17

## 2024-09-16 RX ADMIN — Medication 4 MILLIGRAM(S): at 17:03

## 2024-09-16 RX ADMIN — Medication 4 MILLIGRAM(S): at 11:10

## 2024-09-16 RX ADMIN — DIAZEPAM 20 MILLIGRAM(S): 5 TABLET ORAL at 22:45

## 2024-09-16 RX ADMIN — Medication 1 MILLIGRAM(S): at 04:39

## 2024-09-16 RX ADMIN — FOLIC ACID 1 MILLIGRAM(S): 1 TABLET ORAL at 11:10

## 2024-09-16 RX ADMIN — Medication 1000 MILLILITER(S): at 04:40

## 2024-09-16 RX ADMIN — Medication 4 MILLIGRAM(S): at 04:39

## 2024-09-16 RX ADMIN — DIAZEPAM 20 MILLIGRAM(S): 5 TABLET ORAL at 09:11

## 2024-09-16 RX ADMIN — Medication 50 MILLIGRAM(S): at 04:39

## 2024-09-16 RX ADMIN — DIAZEPAM 20 MILLIGRAM(S): 5 TABLET ORAL at 13:21

## 2024-09-16 RX ADMIN — Medication 100 MILLIGRAM(S): at 11:10

## 2024-09-16 RX ADMIN — SODIUM CHLORIDE 75 MILLILITER(S): 9 INJECTION, SOLUTION INTRAVENOUS at 10:30

## 2024-09-16 RX ADMIN — Medication 1 TABLET(S): at 11:10

## 2024-09-16 RX ADMIN — DIAZEPAM 20 MILLIGRAM(S): 5 TABLET ORAL at 18:24

## 2024-09-16 RX ADMIN — Medication 4 MILLIGRAM(S): at 22:22

## 2024-09-17 LAB
ALBUMIN SERPL ELPH-MCNC: 3.7 G/DL — SIGNIFICANT CHANGE UP (ref 3.3–5)
ALP SERPL-CCNC: 46 U/L — SIGNIFICANT CHANGE UP (ref 40–120)
ALT FLD-CCNC: 92 U/L — HIGH (ref 12–78)
ANION GAP SERPL CALC-SCNC: 9 MMOL/L — SIGNIFICANT CHANGE UP (ref 5–17)
AST SERPL-CCNC: 100 U/L — HIGH (ref 15–37)
BASOPHILS # BLD AUTO: 0.04 K/UL — SIGNIFICANT CHANGE UP (ref 0–0.2)
BASOPHILS NFR BLD AUTO: 1 % — SIGNIFICANT CHANGE UP (ref 0–2)
BILIRUB SERPL-MCNC: 1.1 MG/DL — SIGNIFICANT CHANGE UP (ref 0.2–1.2)
BUN SERPL-MCNC: 7 MG/DL — SIGNIFICANT CHANGE UP (ref 7–23)
CALCIUM SERPL-MCNC: 8.3 MG/DL — LOW (ref 8.5–10.1)
CHLORIDE SERPL-SCNC: 101 MMOL/L — SIGNIFICANT CHANGE UP (ref 96–108)
CO2 SERPL-SCNC: 29 MMOL/L — SIGNIFICANT CHANGE UP (ref 22–31)
CREAT SERPL-MCNC: 0.72 MG/DL — SIGNIFICANT CHANGE UP (ref 0.5–1.3)
EGFR: 110 ML/MIN/1.73M2 — SIGNIFICANT CHANGE UP
EGFR: 110 ML/MIN/1.73M2 — SIGNIFICANT CHANGE UP
EOSINOPHIL # BLD AUTO: 0.1 K/UL — SIGNIFICANT CHANGE UP (ref 0–0.5)
EOSINOPHIL NFR BLD AUTO: 2.5 % — SIGNIFICANT CHANGE UP (ref 0–6)
GLUCOSE SERPL-MCNC: 106 MG/DL — HIGH (ref 70–99)
HCT VFR BLD CALC: 34.5 % — LOW (ref 39–50)
HGB BLD-MCNC: 11.9 G/DL — LOW (ref 13–17)
IMM GRANULOCYTES NFR BLD AUTO: 0.2 % — SIGNIFICANT CHANGE UP (ref 0–0.9)
LYMPHOCYTES # BLD AUTO: 0.85 K/UL — LOW (ref 1–3.3)
LYMPHOCYTES # BLD AUTO: 21 % — SIGNIFICANT CHANGE UP (ref 13–44)
MAGNESIUM SERPL-MCNC: 1.7 MG/DL — SIGNIFICANT CHANGE UP (ref 1.6–2.6)
MCHC RBC-ENTMCNC: 34.5 GM/DL — SIGNIFICANT CHANGE UP (ref 32–36)
MCHC RBC-ENTMCNC: 34.5 PG — HIGH (ref 27–34)
MCV RBC AUTO: 100 FL — SIGNIFICANT CHANGE UP (ref 80–100)
MONOCYTES # BLD AUTO: 0.39 K/UL — SIGNIFICANT CHANGE UP (ref 0–0.9)
MONOCYTES NFR BLD AUTO: 9.7 % — SIGNIFICANT CHANGE UP (ref 2–14)
MRSA PCR RESULT.: SIGNIFICANT CHANGE UP
NEUTROPHILS # BLD AUTO: 2.65 K/UL — SIGNIFICANT CHANGE UP (ref 1.8–7.4)
NEUTROPHILS NFR BLD AUTO: 65.6 % — SIGNIFICANT CHANGE UP (ref 43–77)
NRBC # BLD: 0 /100 WBCS — SIGNIFICANT CHANGE UP (ref 0–0)
NRBC BLD-RTO: 0 /100 WBCS — SIGNIFICANT CHANGE UP (ref 0–0)
PHOSPHATE SERPL-MCNC: 2.5 MG/DL — SIGNIFICANT CHANGE UP (ref 2.5–4.5)
PLATELET # BLD AUTO: 92 K/UL — LOW (ref 150–400)
POTASSIUM SERPL-MCNC: 3.5 MMOL/L — SIGNIFICANT CHANGE UP (ref 3.5–5.3)
POTASSIUM SERPL-SCNC: 3.5 MMOL/L — SIGNIFICANT CHANGE UP (ref 3.5–5.3)
PROT SERPL-MCNC: 6.3 G/DL — SIGNIFICANT CHANGE UP (ref 6–8.3)
RBC # BLD: 3.45 M/UL — LOW (ref 4.2–5.8)
RBC # FLD: 12.3 % — SIGNIFICANT CHANGE UP (ref 10.3–14.5)
S AUREUS DNA NOSE QL NAA+PROBE: SIGNIFICANT CHANGE UP
SODIUM SERPL-SCNC: 139 MMOL/L — SIGNIFICANT CHANGE UP (ref 135–145)
WBC # BLD: 4.04 K/UL — SIGNIFICANT CHANGE UP (ref 3.8–10.5)
WBC # FLD AUTO: 4.04 K/UL — SIGNIFICANT CHANGE UP (ref 3.8–10.5)

## 2024-09-17 PROCEDURE — 99233 SBSQ HOSP IP/OBS HIGH 50: CPT

## 2024-09-17 PROCEDURE — 99233 SBSQ HOSP IP/OBS HIGH 50: CPT | Mod: GC

## 2024-09-17 RX ORDER — DIAZEPAM 5 MG/1
10 TABLET ORAL ONCE
Refills: 0 | Status: DISCONTINUED | OUTPATIENT
Start: 2024-09-17 | End: 2024-09-17

## 2024-09-17 RX ORDER — PHENOBARBITAL 30 MG/1
130 TABLET ORAL
Refills: 0 | Status: DISCONTINUED | OUTPATIENT
Start: 2024-09-17 | End: 2024-09-18

## 2024-09-17 RX ORDER — DIAZEPAM 5 MG/1
10 TABLET ORAL EVERY 6 HOURS
Refills: 0 | Status: DISCONTINUED | OUTPATIENT
Start: 2024-09-17 | End: 2024-09-17

## 2024-09-17 RX ORDER — DIAZEPAM 5 MG/1
10 TABLET ORAL EVERY 4 HOURS
Refills: 0 | Status: DISCONTINUED | OUTPATIENT
Start: 2024-09-17 | End: 2024-09-17

## 2024-09-17 RX ORDER — ENOXAPARIN SODIUM 100 MG/ML
40 INJECTION SUBCUTANEOUS EVERY 24 HOURS
Refills: 0 | Status: DISCONTINUED | OUTPATIENT
Start: 2024-09-17 | End: 2024-09-21

## 2024-09-17 RX ORDER — PHENOBARBITAL 30 MG/1
130 TABLET ORAL
Refills: 0 | Status: DISCONTINUED | OUTPATIENT
Start: 2024-09-17 | End: 2024-09-17

## 2024-09-17 RX ORDER — DIAZEPAM 5 MG/1
10 TABLET ORAL EVERY 8 HOURS
Refills: 0 | Status: DISCONTINUED | OUTPATIENT
Start: 2024-09-17 | End: 2024-09-17

## 2024-09-17 RX ORDER — DEXMEDETOMIDINE HYDROCHLORIDE IN SODIUM CHLORIDE 4 UG/ML
0.2 INJECTION INTRAVENOUS
Qty: 400 | Refills: 0 | Status: DISCONTINUED | OUTPATIENT
Start: 2024-09-17 | End: 2024-09-18

## 2024-09-17 RX ORDER — PHENOBARBITAL 30 MG/1
260 TABLET ORAL ONCE
Refills: 0 | Status: DISCONTINUED | OUTPATIENT
Start: 2024-09-17 | End: 2024-09-17

## 2024-09-17 RX ORDER — PHENOBARBITAL 30 MG/1
65 TABLET ORAL EVERY 6 HOURS
Refills: 0 | Status: DISCONTINUED | OUTPATIENT
Start: 2024-09-17 | End: 2024-09-18

## 2024-09-17 RX ADMIN — PHENOBARBITAL 260 MILLIGRAM(S): 30 TABLET ORAL at 23:22

## 2024-09-17 RX ADMIN — Medication 1 TABLET(S): at 12:02

## 2024-09-17 RX ADMIN — Medication 3 MILLIGRAM(S): at 09:32

## 2024-09-17 RX ADMIN — Medication 4 MILLIGRAM(S): at 06:28

## 2024-09-17 RX ADMIN — Medication 4 MILLIGRAM(S): at 02:10

## 2024-09-17 RX ADMIN — PHENOBARBITAL 130 MILLIGRAM(S): 30 TABLET ORAL at 20:09

## 2024-09-17 RX ADMIN — PHENOBARBITAL 130 MILLIGRAM(S): 30 TABLET ORAL at 21:58

## 2024-09-17 RX ADMIN — Medication 3 MILLIGRAM(S): at 14:20

## 2024-09-17 RX ADMIN — Medication 1 APPLICATION(S): at 06:54

## 2024-09-17 RX ADMIN — PHENOBARBITAL 130 MILLIGRAM(S): 30 TABLET ORAL at 20:52

## 2024-09-17 RX ADMIN — ENOXAPARIN SODIUM 40 MILLIGRAM(S): 100 INJECTION SUBCUTANEOUS at 14:20

## 2024-09-17 RX ADMIN — DIAZEPAM 10 MILLIGRAM(S): 5 TABLET ORAL at 17:10

## 2024-09-17 RX ADMIN — DIAZEPAM 20 MILLIGRAM(S): 5 TABLET ORAL at 03:23

## 2024-09-17 RX ADMIN — Medication 100 MILLIGRAM(S): at 12:02

## 2024-09-17 RX ADMIN — FOLIC ACID 1 MILLIGRAM(S): 1 TABLET ORAL at 12:02

## 2024-09-17 RX ADMIN — PHENOBARBITAL 130 MILLIGRAM(S): 30 TABLET ORAL at 21:24

## 2024-09-17 RX ADMIN — PHENOBARBITAL 130 MILLIGRAM(S): 30 TABLET ORAL at 18:28

## 2024-09-17 RX ADMIN — DIAZEPAM 10 MILLIGRAM(S): 5 TABLET ORAL at 12:12

## 2024-09-17 RX ADMIN — DEXMEDETOMIDINE HYDROCHLORIDE IN SODIUM CHLORIDE 3.05 MICROGRAM(S)/KG/HR: 4 INJECTION INTRAVENOUS at 23:21

## 2024-09-17 RX ADMIN — DIAZEPAM 20 MILLIGRAM(S): 5 TABLET ORAL at 07:30

## 2024-09-18 LAB
ALBUMIN SERPL ELPH-MCNC: 3.4 G/DL — SIGNIFICANT CHANGE UP (ref 3.3–5)
ALP SERPL-CCNC: 48 U/L — SIGNIFICANT CHANGE UP (ref 40–120)
ALT FLD-CCNC: 77 U/L — SIGNIFICANT CHANGE UP (ref 12–78)
ANION GAP SERPL CALC-SCNC: 10 MMOL/L — SIGNIFICANT CHANGE UP (ref 5–17)
AST SERPL-CCNC: 63 U/L — HIGH (ref 15–37)
BASOPHILS # BLD AUTO: 0.03 K/UL — SIGNIFICANT CHANGE UP (ref 0–0.2)
BASOPHILS NFR BLD AUTO: 0.8 % — SIGNIFICANT CHANGE UP (ref 0–2)
BILIRUB SERPL-MCNC: 0.8 MG/DL — SIGNIFICANT CHANGE UP (ref 0.2–1.2)
BUN SERPL-MCNC: 8 MG/DL — SIGNIFICANT CHANGE UP (ref 7–23)
CALCIUM SERPL-MCNC: 7.9 MG/DL — LOW (ref 8.5–10.1)
CHLORIDE SERPL-SCNC: 103 MMOL/L — SIGNIFICANT CHANGE UP (ref 96–108)
CO2 SERPL-SCNC: 25 MMOL/L — SIGNIFICANT CHANGE UP (ref 22–31)
CREAT SERPL-MCNC: 0.6 MG/DL — SIGNIFICANT CHANGE UP (ref 0.5–1.3)
EGFR: 116 ML/MIN/1.73M2 — SIGNIFICANT CHANGE UP
EGFR: 116 ML/MIN/1.73M2 — SIGNIFICANT CHANGE UP
EOSINOPHIL # BLD AUTO: 0.33 K/UL — SIGNIFICANT CHANGE UP (ref 0–0.5)
EOSINOPHIL NFR BLD AUTO: 8.4 % — HIGH (ref 0–6)
GLUCOSE SERPL-MCNC: 90 MG/DL — SIGNIFICANT CHANGE UP (ref 70–99)
HCT VFR BLD CALC: 34.4 % — LOW (ref 39–50)
HGB BLD-MCNC: 11.4 G/DL — LOW (ref 13–17)
IMM GRANULOCYTES NFR BLD AUTO: 0.5 % — SIGNIFICANT CHANGE UP (ref 0–0.9)
LYMPHOCYTES # BLD AUTO: 1.15 K/UL — SIGNIFICANT CHANGE UP (ref 1–3.3)
LYMPHOCYTES # BLD AUTO: 29.1 % — SIGNIFICANT CHANGE UP (ref 13–44)
MAGNESIUM SERPL-MCNC: 1.2 MG/DL — LOW (ref 1.6–2.6)
MCHC RBC-ENTMCNC: 33.1 GM/DL — SIGNIFICANT CHANGE UP (ref 32–36)
MCHC RBC-ENTMCNC: 33.2 PG — SIGNIFICANT CHANGE UP (ref 27–34)
MCV RBC AUTO: 100.3 FL — HIGH (ref 80–100)
MONOCYTES # BLD AUTO: 0.4 K/UL — SIGNIFICANT CHANGE UP (ref 0–0.9)
MONOCYTES NFR BLD AUTO: 10.1 % — SIGNIFICANT CHANGE UP (ref 2–14)
NEUTROPHILS # BLD AUTO: 2.02 K/UL — SIGNIFICANT CHANGE UP (ref 1.8–7.4)
NEUTROPHILS NFR BLD AUTO: 51.1 % — SIGNIFICANT CHANGE UP (ref 43–77)
NRBC # BLD: 0 /100 WBCS — SIGNIFICANT CHANGE UP (ref 0–0)
NRBC BLD-RTO: 0 /100 WBCS — SIGNIFICANT CHANGE UP (ref 0–0)
PHENOBARB SERPL-MCNC: 26.8 UG/ML — SIGNIFICANT CHANGE UP (ref 15–40)
PHOSPHATE SERPL-MCNC: 3.1 MG/DL — SIGNIFICANT CHANGE UP (ref 2.5–4.5)
PLATELET # BLD AUTO: 79 K/UL — LOW (ref 150–400)
POTASSIUM SERPL-MCNC: 3.2 MMOL/L — LOW (ref 3.5–5.3)
POTASSIUM SERPL-SCNC: 3.2 MMOL/L — LOW (ref 3.5–5.3)
PROT SERPL-MCNC: 6.4 G/DL — SIGNIFICANT CHANGE UP (ref 6–8.3)
RBC # BLD: 3.43 M/UL — LOW (ref 4.2–5.8)
RBC # FLD: 11.9 % — SIGNIFICANT CHANGE UP (ref 10.3–14.5)
SODIUM SERPL-SCNC: 138 MMOL/L — SIGNIFICANT CHANGE UP (ref 135–145)
WBC # BLD: 3.95 K/UL — SIGNIFICANT CHANGE UP (ref 3.8–10.5)
WBC # FLD AUTO: 3.95 K/UL — SIGNIFICANT CHANGE UP (ref 3.8–10.5)

## 2024-09-18 PROCEDURE — 99291 CRITICAL CARE FIRST HOUR: CPT | Mod: GC

## 2024-09-18 PROCEDURE — 99232 SBSQ HOSP IP/OBS MODERATE 35: CPT

## 2024-09-18 RX ORDER — DEXMEDETOMIDINE HYDROCHLORIDE IN SODIUM CHLORIDE 4 UG/ML
0.7 INJECTION INTRAVENOUS
Qty: 200 | Refills: 0 | Status: DISCONTINUED | OUTPATIENT
Start: 2024-09-18 | End: 2024-09-19

## 2024-09-18 RX ORDER — SODIUM CHLORIDE 9 G/1000ML
1000 INJECTION, SOLUTION INTRAVENOUS ONCE
Refills: 0 | Status: COMPLETED | OUTPATIENT
Start: 2024-09-18 | End: 2024-09-18

## 2024-09-18 RX ORDER — MAGNESIUM SULFATE 500 MG/ML
2 SYRINGE (ML) INJECTION ONCE
Refills: 0 | Status: COMPLETED | OUTPATIENT
Start: 2024-09-18 | End: 2024-09-18

## 2024-09-18 RX ORDER — PHENOBARBITAL 30 MG/1
130 TABLET ORAL
Refills: 0 | Status: DISCONTINUED | OUTPATIENT
Start: 2024-09-18 | End: 2024-09-19

## 2024-09-18 RX ORDER — DEXMEDETOMIDINE HYDROCHLORIDE IN SODIUM CHLORIDE 4 UG/ML
0.2 INJECTION INTRAVENOUS
Qty: 400 | Refills: 0 | Status: DISCONTINUED | OUTPATIENT
Start: 2024-09-18 | End: 2024-09-18

## 2024-09-18 RX ADMIN — PHENOBARBITAL 65 MILLIGRAM(S): 30 TABLET ORAL at 06:15

## 2024-09-18 RX ADMIN — PHENOBARBITAL 130 MILLIGRAM(S): 30 TABLET ORAL at 18:32

## 2024-09-18 RX ADMIN — Medication 100 MILLIEQUIVALENT(S): at 13:09

## 2024-09-18 RX ADMIN — SODIUM CHLORIDE 1000 MILLILITER(S): 9 INJECTION, SOLUTION INTRAVENOUS at 12:17

## 2024-09-18 RX ADMIN — Medication 1 APPLICATION(S): at 11:40

## 2024-09-18 RX ADMIN — DEXMEDETOMIDINE HYDROCHLORIDE IN SODIUM CHLORIDE 10.7 MICROGRAM(S)/KG/HR: 4 INJECTION INTRAVENOUS at 20:50

## 2024-09-18 RX ADMIN — Medication 100 MILLIEQUIVALENT(S): at 12:18

## 2024-09-18 RX ADMIN — Medication 100 MILLIEQUIVALENT(S): at 13:29

## 2024-09-18 RX ADMIN — Medication 25 GRAM(S): at 12:17

## 2024-09-18 RX ADMIN — ENOXAPARIN SODIUM 40 MILLIGRAM(S): 100 INJECTION SUBCUTANEOUS at 13:29

## 2024-09-19 LAB
ALBUMIN SERPL ELPH-MCNC: 3.3 G/DL — SIGNIFICANT CHANGE UP (ref 3.3–5)
ALP SERPL-CCNC: 59 U/L — SIGNIFICANT CHANGE UP (ref 40–120)
ALT FLD-CCNC: 80 U/L — HIGH (ref 12–78)
ANION GAP SERPL CALC-SCNC: 5 MMOL/L — SIGNIFICANT CHANGE UP (ref 5–17)
AST SERPL-CCNC: 75 U/L — HIGH (ref 15–37)
BASOPHILS # BLD AUTO: 0.03 K/UL — SIGNIFICANT CHANGE UP (ref 0–0.2)
BASOPHILS NFR BLD AUTO: 0.7 % — SIGNIFICANT CHANGE UP (ref 0–2)
BILIRUB SERPL-MCNC: 0.7 MG/DL — SIGNIFICANT CHANGE UP (ref 0.2–1.2)
BUN SERPL-MCNC: 6 MG/DL — LOW (ref 7–23)
CALCIUM SERPL-MCNC: 9 MG/DL — SIGNIFICANT CHANGE UP (ref 8.5–10.1)
CHLORIDE SERPL-SCNC: 104 MMOL/L — SIGNIFICANT CHANGE UP (ref 96–108)
CO2 SERPL-SCNC: 28 MMOL/L — SIGNIFICANT CHANGE UP (ref 22–31)
CREAT SERPL-MCNC: 0.65 MG/DL — SIGNIFICANT CHANGE UP (ref 0.5–1.3)
EGFR: 113 ML/MIN/1.73M2 — SIGNIFICANT CHANGE UP
EGFR: 113 ML/MIN/1.73M2 — SIGNIFICANT CHANGE UP
EOSINOPHIL # BLD AUTO: 0.44 K/UL — SIGNIFICANT CHANGE UP (ref 0–0.5)
EOSINOPHIL NFR BLD AUTO: 9.6 % — HIGH (ref 0–6)
GLUCOSE SERPL-MCNC: 102 MG/DL — HIGH (ref 70–99)
HCT VFR BLD CALC: 36 % — LOW (ref 39–50)
HGB BLD-MCNC: 12.1 G/DL — LOW (ref 13–17)
IMM GRANULOCYTES NFR BLD AUTO: 0.4 % — SIGNIFICANT CHANGE UP (ref 0–0.9)
LYMPHOCYTES # BLD AUTO: 1.29 K/UL — SIGNIFICANT CHANGE UP (ref 1–3.3)
LYMPHOCYTES # BLD AUTO: 28.2 % — SIGNIFICANT CHANGE UP (ref 13–44)
MAGNESIUM SERPL-MCNC: 1.8 MG/DL — SIGNIFICANT CHANGE UP (ref 1.6–2.6)
MCHC RBC-ENTMCNC: 33.6 GM/DL — SIGNIFICANT CHANGE UP (ref 32–36)
MCHC RBC-ENTMCNC: 33.6 PG — SIGNIFICANT CHANGE UP (ref 27–34)
MCV RBC AUTO: 100 FL — SIGNIFICANT CHANGE UP (ref 80–100)
MONOCYTES # BLD AUTO: 0.66 K/UL — SIGNIFICANT CHANGE UP (ref 0–0.9)
MONOCYTES NFR BLD AUTO: 14.4 % — HIGH (ref 2–14)
NEUTROPHILS # BLD AUTO: 2.14 K/UL — SIGNIFICANT CHANGE UP (ref 1.8–7.4)
NEUTROPHILS NFR BLD AUTO: 46.7 % — SIGNIFICANT CHANGE UP (ref 43–77)
NRBC # BLD: 0 /100 WBCS — SIGNIFICANT CHANGE UP (ref 0–0)
NRBC BLD-RTO: 0 /100 WBCS — SIGNIFICANT CHANGE UP (ref 0–0)
PHOSPHATE SERPL-MCNC: 3.1 MG/DL — SIGNIFICANT CHANGE UP (ref 2.5–4.5)
PLATELET # BLD AUTO: 76 K/UL — LOW (ref 150–400)
POTASSIUM SERPL-MCNC: 3.7 MMOL/L — SIGNIFICANT CHANGE UP (ref 3.5–5.3)
POTASSIUM SERPL-SCNC: 3.7 MMOL/L — SIGNIFICANT CHANGE UP (ref 3.5–5.3)
PROT SERPL-MCNC: 6.4 G/DL — SIGNIFICANT CHANGE UP (ref 6–8.3)
RBC # BLD: 3.6 M/UL — LOW (ref 4.2–5.8)
RBC # FLD: 11.9 % — SIGNIFICANT CHANGE UP (ref 10.3–14.5)
SODIUM SERPL-SCNC: 137 MMOL/L — SIGNIFICANT CHANGE UP (ref 135–145)
WBC # BLD: 4.58 K/UL — SIGNIFICANT CHANGE UP (ref 3.8–10.5)
WBC # FLD AUTO: 4.58 K/UL — SIGNIFICANT CHANGE UP (ref 3.8–10.5)

## 2024-09-19 PROCEDURE — 99232 SBSQ HOSP IP/OBS MODERATE 35: CPT

## 2024-09-19 PROCEDURE — 99232 SBSQ HOSP IP/OBS MODERATE 35: CPT | Mod: GC

## 2024-09-19 RX ORDER — PHENOBARBITAL 30 MG/1
130 TABLET ORAL ONCE
Refills: 0 | Status: DISCONTINUED | OUTPATIENT
Start: 2024-09-19 | End: 2024-09-19

## 2024-09-19 RX ORDER — HALOPERIDOL 10 MG/1
2 TABLET ORAL ONCE
Refills: 0 | Status: COMPLETED | OUTPATIENT
Start: 2024-09-19 | End: 2024-09-19

## 2024-09-19 RX ORDER — SODIUM CHLORIDE 9 G/1000ML
1000 INJECTION, SOLUTION INTRAVENOUS ONCE
Refills: 0 | Status: COMPLETED | OUTPATIENT
Start: 2024-09-19 | End: 2024-09-19

## 2024-09-19 RX ADMIN — HALOPERIDOL 2 MILLIGRAM(S): 10 TABLET ORAL at 22:05

## 2024-09-19 RX ADMIN — PHENOBARBITAL 130 MILLIGRAM(S): 30 TABLET ORAL at 08:44

## 2024-09-19 RX ADMIN — DEXMEDETOMIDINE HYDROCHLORIDE IN SODIUM CHLORIDE 10.7 MICROGRAM(S)/KG/HR: 4 INJECTION INTRAVENOUS at 00:36

## 2024-09-19 RX ADMIN — FOLIC ACID 1 MILLIGRAM(S): 1 TABLET ORAL at 11:18

## 2024-09-19 RX ADMIN — PHENOBARBITAL 130 MILLIGRAM(S): 30 TABLET ORAL at 06:38

## 2024-09-19 RX ADMIN — SODIUM CHLORIDE 1000 MILLILITER(S): 9 INJECTION, SOLUTION INTRAVENOUS at 07:17

## 2024-09-19 RX ADMIN — PHENOBARBITAL 130 MILLIGRAM(S): 30 TABLET ORAL at 19:23

## 2024-09-19 RX ADMIN — PHENOBARBITAL 130 MILLIGRAM(S): 30 TABLET ORAL at 22:05

## 2024-09-19 RX ADMIN — ENOXAPARIN SODIUM 40 MILLIGRAM(S): 100 INJECTION SUBCUTANEOUS at 13:07

## 2024-09-19 RX ADMIN — Medication 1 APPLICATION(S): at 07:25

## 2024-09-19 RX ADMIN — PHENOBARBITAL 130 MILLIGRAM(S): 30 TABLET ORAL at 23:13

## 2024-09-19 RX ADMIN — PHENOBARBITAL 130 MILLIGRAM(S): 30 TABLET ORAL at 20:34

## 2024-09-19 RX ADMIN — DEXMEDETOMIDINE HYDROCHLORIDE IN SODIUM CHLORIDE 10.7 MICROGRAM(S)/KG/HR: 4 INJECTION INTRAVENOUS at 17:50

## 2024-09-19 RX ADMIN — Medication 1 TABLET(S): at 11:18

## 2024-09-20 LAB
ALBUMIN SERPL ELPH-MCNC: 3.5 G/DL — SIGNIFICANT CHANGE UP (ref 3.3–5)
ALP SERPL-CCNC: 58 U/L — SIGNIFICANT CHANGE UP (ref 40–120)
ALT FLD-CCNC: 90 U/L — HIGH (ref 12–78)
ANION GAP SERPL CALC-SCNC: 6 MMOL/L — SIGNIFICANT CHANGE UP (ref 5–17)
AST SERPL-CCNC: 93 U/L — HIGH (ref 15–37)
BASOPHILS # BLD AUTO: 0.04 K/UL — SIGNIFICANT CHANGE UP (ref 0–0.2)
BASOPHILS NFR BLD AUTO: 0.9 % — SIGNIFICANT CHANGE UP (ref 0–2)
BILIRUB SERPL-MCNC: 0.3 MG/DL — SIGNIFICANT CHANGE UP (ref 0.2–1.2)
BUN SERPL-MCNC: 3 MG/DL — LOW (ref 7–23)
CALCIUM SERPL-MCNC: 9 MG/DL — SIGNIFICANT CHANGE UP (ref 8.5–10.1)
CHLORIDE SERPL-SCNC: 104 MMOL/L — SIGNIFICANT CHANGE UP (ref 96–108)
CO2 SERPL-SCNC: 27 MMOL/L — SIGNIFICANT CHANGE UP (ref 22–31)
CREAT SERPL-MCNC: 0.74 MG/DL — SIGNIFICANT CHANGE UP (ref 0.5–1.3)
EGFR: 109 ML/MIN/1.73M2 — SIGNIFICANT CHANGE UP
EGFR: 109 ML/MIN/1.73M2 — SIGNIFICANT CHANGE UP
EOSINOPHIL # BLD AUTO: 0.29 K/UL — SIGNIFICANT CHANGE UP (ref 0–0.5)
EOSINOPHIL NFR BLD AUTO: 6.3 % — HIGH (ref 0–6)
GLUCOSE SERPL-MCNC: 90 MG/DL — SIGNIFICANT CHANGE UP (ref 70–99)
HCT VFR BLD CALC: 37.1 % — LOW (ref 39–50)
HGB BLD-MCNC: 13 G/DL — SIGNIFICANT CHANGE UP (ref 13–17)
IMM GRANULOCYTES NFR BLD AUTO: 0.7 % — SIGNIFICANT CHANGE UP (ref 0–0.9)
LYMPHOCYTES # BLD AUTO: 1.11 K/UL — SIGNIFICANT CHANGE UP (ref 1–3.3)
LYMPHOCYTES # BLD AUTO: 24.3 % — SIGNIFICANT CHANGE UP (ref 13–44)
MAGNESIUM SERPL-MCNC: 1.5 MG/DL — LOW (ref 1.6–2.6)
MCHC RBC-ENTMCNC: 34.9 PG — HIGH (ref 27–34)
MCHC RBC-ENTMCNC: 35 GM/DL — SIGNIFICANT CHANGE UP (ref 32–36)
MCV RBC AUTO: 99.5 FL — SIGNIFICANT CHANGE UP (ref 80–100)
MONOCYTES # BLD AUTO: 0.48 K/UL — SIGNIFICANT CHANGE UP (ref 0–0.9)
MONOCYTES NFR BLD AUTO: 10.5 % — SIGNIFICANT CHANGE UP (ref 2–14)
NEUTROPHILS # BLD AUTO: 2.62 K/UL — SIGNIFICANT CHANGE UP (ref 1.8–7.4)
NEUTROPHILS NFR BLD AUTO: 57.3 % — SIGNIFICANT CHANGE UP (ref 43–77)
NRBC # BLD: 0 /100 WBCS — SIGNIFICANT CHANGE UP (ref 0–0)
NRBC BLD-RTO: 0 /100 WBCS — SIGNIFICANT CHANGE UP (ref 0–0)
PHENOBARB SERPL-MCNC: 37.5 UG/ML — SIGNIFICANT CHANGE UP (ref 15–40)
PHOSPHATE SERPL-MCNC: 1.5 MG/DL — LOW (ref 2.5–4.5)
PLATELET # BLD AUTO: 97 K/UL — LOW (ref 150–400)
POTASSIUM SERPL-MCNC: 3.4 MMOL/L — LOW (ref 3.5–5.3)
POTASSIUM SERPL-SCNC: 3.4 MMOL/L — LOW (ref 3.5–5.3)
PROT SERPL-MCNC: 6.6 G/DL — SIGNIFICANT CHANGE UP (ref 6–8.3)
RBC # BLD: 3.73 M/UL — LOW (ref 4.2–5.8)
RBC # FLD: 11.9 % — SIGNIFICANT CHANGE UP (ref 10.3–14.5)
SODIUM SERPL-SCNC: 137 MMOL/L — SIGNIFICANT CHANGE UP (ref 135–145)
WBC # BLD: 4.57 K/UL — SIGNIFICANT CHANGE UP (ref 3.8–10.5)
WBC # FLD AUTO: 4.57 K/UL — SIGNIFICANT CHANGE UP (ref 3.8–10.5)

## 2024-09-20 PROCEDURE — 99232 SBSQ HOSP IP/OBS MODERATE 35: CPT

## 2024-09-20 PROCEDURE — 99233 SBSQ HOSP IP/OBS HIGH 50: CPT | Mod: GC

## 2024-09-20 RX ORDER — LORAZEPAM 4 MG/ML
2 VIAL (ML) INJECTION
Refills: 0 | Status: DISCONTINUED | OUTPATIENT
Start: 2024-09-20 | End: 2024-09-21

## 2024-09-20 RX ORDER — HALOPERIDOL 10 MG/1
2 TABLET ORAL ONCE
Refills: 0 | Status: COMPLETED | OUTPATIENT
Start: 2024-09-20 | End: 2024-09-20

## 2024-09-20 RX ORDER — ACETAMINOPHEN 500 MG/5ML
1000 LIQUID (ML) ORAL ONCE
Refills: 0 | Status: DISCONTINUED | OUTPATIENT
Start: 2024-09-20 | End: 2024-09-20

## 2024-09-20 RX ORDER — MAGNESIUM SULFATE 500 MG/ML
2 SYRINGE (ML) INJECTION ONCE
Refills: 0 | Status: COMPLETED | OUTPATIENT
Start: 2024-09-20 | End: 2024-09-20

## 2024-09-20 RX ORDER — POTASSIUM PHOSPHATE, MONOBASIC POTASSIUM PHOSPHATE, DIBASIC INJECTION, 236; 224 MG/ML; MG/ML
30 SOLUTION, CONCENTRATE INTRAVENOUS ONCE
Refills: 0 | Status: COMPLETED | OUTPATIENT
Start: 2024-09-20 | End: 2024-09-20

## 2024-09-20 RX ADMIN — FOLIC ACID 1 MILLIGRAM(S): 1 TABLET ORAL at 13:17

## 2024-09-20 RX ADMIN — ENOXAPARIN SODIUM 40 MILLIGRAM(S): 100 INJECTION SUBCUTANEOUS at 14:30

## 2024-09-20 RX ADMIN — Medication 1 TABLET(S): at 13:17

## 2024-09-20 RX ADMIN — Medication 1 APPLICATION(S): at 13:18

## 2024-09-20 RX ADMIN — Medication 25 GRAM(S): at 08:40

## 2024-09-20 RX ADMIN — POTASSIUM PHOSPHATE, MONOBASIC POTASSIUM PHOSPHATE, DIBASIC INJECTION, 83.33 MILLIMOLE(S): 236; 224 SOLUTION, CONCENTRATE INTRAVENOUS at 08:40

## 2024-09-21 ENCOUNTER — TRANSCRIPTION ENCOUNTER (OUTPATIENT)
Age: 54
End: 2024-09-21

## 2024-09-21 VITALS
OXYGEN SATURATION: 99 % | HEART RATE: 100 BPM | RESPIRATION RATE: 18 BRPM | DIASTOLIC BLOOD PRESSURE: 95 MMHG | SYSTOLIC BLOOD PRESSURE: 132 MMHG | TEMPERATURE: 99 F

## 2024-09-21 LAB
ALBUMIN SERPL ELPH-MCNC: 3.6 G/DL — SIGNIFICANT CHANGE UP (ref 3.3–5)
ALP SERPL-CCNC: 60 U/L — SIGNIFICANT CHANGE UP (ref 40–120)
ALT FLD-CCNC: 86 U/L — HIGH (ref 12–78)
ANION GAP SERPL CALC-SCNC: 8 MMOL/L — SIGNIFICANT CHANGE UP (ref 5–17)
AST SERPL-CCNC: 75 U/L — HIGH (ref 15–37)
BASOPHILS # BLD AUTO: 0.04 K/UL — SIGNIFICANT CHANGE UP (ref 0–0.2)
BASOPHILS NFR BLD AUTO: 0.9 % — SIGNIFICANT CHANGE UP (ref 0–2)
BILIRUB SERPL-MCNC: 0.3 MG/DL — SIGNIFICANT CHANGE UP (ref 0.2–1.2)
BUN SERPL-MCNC: 3 MG/DL — LOW (ref 7–23)
CALCIUM SERPL-MCNC: 9.2 MG/DL — SIGNIFICANT CHANGE UP (ref 8.5–10.1)
CHLORIDE SERPL-SCNC: 102 MMOL/L — SIGNIFICANT CHANGE UP (ref 96–108)
CO2 SERPL-SCNC: 25 MMOL/L — SIGNIFICANT CHANGE UP (ref 22–31)
CREAT SERPL-MCNC: 0.63 MG/DL — SIGNIFICANT CHANGE UP (ref 0.5–1.3)
EGFR: 114 ML/MIN/1.73M2 — SIGNIFICANT CHANGE UP
EGFR: 114 ML/MIN/1.73M2 — SIGNIFICANT CHANGE UP
EOSINOPHIL # BLD AUTO: 0.2 K/UL — SIGNIFICANT CHANGE UP (ref 0–0.5)
EOSINOPHIL NFR BLD AUTO: 4.3 % — SIGNIFICANT CHANGE UP (ref 0–6)
GLUCOSE SERPL-MCNC: 115 MG/DL — HIGH (ref 70–99)
HCT VFR BLD CALC: 36.2 % — LOW (ref 39–50)
HGB BLD-MCNC: 12.1 G/DL — LOW (ref 13–17)
IMM GRANULOCYTES NFR BLD AUTO: 0.7 % — SIGNIFICANT CHANGE UP (ref 0–0.9)
LYMPHOCYTES # BLD AUTO: 1.31 K/UL — SIGNIFICANT CHANGE UP (ref 1–3.3)
LYMPHOCYTES # BLD AUTO: 28.4 % — SIGNIFICANT CHANGE UP (ref 13–44)
MAGNESIUM SERPL-MCNC: 1.8 MG/DL — SIGNIFICANT CHANGE UP (ref 1.6–2.6)
MCHC RBC-ENTMCNC: 33.3 PG — SIGNIFICANT CHANGE UP (ref 27–34)
MCHC RBC-ENTMCNC: 33.4 GM/DL — SIGNIFICANT CHANGE UP (ref 32–36)
MCV RBC AUTO: 99.7 FL — SIGNIFICANT CHANGE UP (ref 80–100)
MONOCYTES # BLD AUTO: 0.74 K/UL — SIGNIFICANT CHANGE UP (ref 0–0.9)
MONOCYTES NFR BLD AUTO: 16.1 % — HIGH (ref 2–14)
NEUTROPHILS # BLD AUTO: 2.29 K/UL — SIGNIFICANT CHANGE UP (ref 1.8–7.4)
NEUTROPHILS NFR BLD AUTO: 49.6 % — SIGNIFICANT CHANGE UP (ref 43–77)
NRBC # BLD: 0 /100 WBCS — SIGNIFICANT CHANGE UP (ref 0–0)
NRBC BLD-RTO: 0 /100 WBCS — SIGNIFICANT CHANGE UP (ref 0–0)
PHOSPHATE SERPL-MCNC: 2.6 MG/DL — SIGNIFICANT CHANGE UP (ref 2.5–4.5)
PLATELET # BLD AUTO: 128 K/UL — LOW (ref 150–400)
POTASSIUM SERPL-MCNC: 4 MMOL/L — SIGNIFICANT CHANGE UP (ref 3.5–5.3)
POTASSIUM SERPL-SCNC: 4 MMOL/L — SIGNIFICANT CHANGE UP (ref 3.5–5.3)
PROT SERPL-MCNC: 6.9 G/DL — SIGNIFICANT CHANGE UP (ref 6–8.3)
RBC # BLD: 3.63 M/UL — LOW (ref 4.2–5.8)
RBC # FLD: 12.2 % — SIGNIFICANT CHANGE UP (ref 10.3–14.5)
SODIUM SERPL-SCNC: 135 MMOL/L — SIGNIFICANT CHANGE UP (ref 135–145)
WBC # BLD: 4.61 K/UL — SIGNIFICANT CHANGE UP (ref 3.8–10.5)
WBC # FLD AUTO: 4.61 K/UL — SIGNIFICANT CHANGE UP (ref 3.8–10.5)

## 2024-09-21 PROCEDURE — 85610 PROTHROMBIN TIME: CPT

## 2024-09-21 PROCEDURE — 70450 CT HEAD/BRAIN W/O DYE: CPT | Mod: MC

## 2024-09-21 PROCEDURE — 71045 X-RAY EXAM CHEST 1 VIEW: CPT

## 2024-09-21 PROCEDURE — 83735 ASSAY OF MAGNESIUM: CPT

## 2024-09-21 PROCEDURE — 80074 ACUTE HEPATITIS PANEL: CPT

## 2024-09-21 PROCEDURE — 80048 BASIC METABOLIC PNL TOTAL CA: CPT

## 2024-09-21 PROCEDURE — 82962 GLUCOSE BLOOD TEST: CPT

## 2024-09-21 PROCEDURE — 80053 COMPREHEN METABOLIC PANEL: CPT

## 2024-09-21 PROCEDURE — 80307 DRUG TEST PRSMV CHEM ANLYZR: CPT

## 2024-09-21 PROCEDURE — 36415 COLL VENOUS BLD VENIPUNCTURE: CPT

## 2024-09-21 PROCEDURE — 87640 STAPH A DNA AMP PROBE: CPT

## 2024-09-21 PROCEDURE — 99285 EMERGENCY DEPT VISIT HI MDM: CPT

## 2024-09-21 PROCEDURE — 76700 US EXAM ABDOM COMPLETE: CPT

## 2024-09-21 PROCEDURE — 85025 COMPLETE CBC W/AUTO DIFF WBC: CPT

## 2024-09-21 PROCEDURE — 96375 TX/PRO/DX INJ NEW DRUG ADDON: CPT

## 2024-09-21 PROCEDURE — 93005 ELECTROCARDIOGRAM TRACING: CPT

## 2024-09-21 PROCEDURE — 83605 ASSAY OF LACTIC ACID: CPT

## 2024-09-21 PROCEDURE — 85730 THROMBOPLASTIN TIME PARTIAL: CPT

## 2024-09-21 PROCEDURE — 80184 ASSAY OF PHENOBARBITAL: CPT

## 2024-09-21 PROCEDURE — 96374 THER/PROPH/DIAG INJ IV PUSH: CPT

## 2024-09-21 PROCEDURE — 87641 MR-STAPH DNA AMP PROBE: CPT

## 2024-09-21 PROCEDURE — 80076 HEPATIC FUNCTION PANEL: CPT

## 2024-09-21 PROCEDURE — 99239 HOSP IP/OBS DSCHRG MGMT >30: CPT

## 2024-09-21 PROCEDURE — 84100 ASSAY OF PHOSPHORUS: CPT

## 2024-09-21 RX ORDER — B1/B2/B3/B5/B6/B12/VIT C/FOLIC 500-0.5 MG
1 TABLET ORAL
Qty: 30 | Refills: 0
Start: 2024-09-21

## 2024-09-21 RX ORDER — FOLIC ACID 1 MG/1
1 TABLET ORAL
Qty: 30 | Refills: 0
Start: 2024-09-21

## 2024-09-21 RX ADMIN — FOLIC ACID 1 MILLIGRAM(S): 1 TABLET ORAL at 11:43

## 2024-09-21 RX ADMIN — Medication 1 APPLICATION(S): at 06:28

## 2024-09-21 RX ADMIN — Medication 1 TABLET(S): at 11:43

## 2024-12-19 NOTE — ED ADULT TRIAGE NOTE - TEMPERATURE IN FAHRENHEIT (DEGREES F)
For information on Fall & Injury Prevention, visit: [email or web address] OR  [email or web address] OR  https://www.cdc.gov/steadi/patient.html
98.4

## 2025-02-07 ENCOUNTER — EMERGENCY (EMERGENCY)
Facility: HOSPITAL | Age: 55
LOS: 0 days | Discharge: HOME | End: 2025-02-07
Attending: EMERGENCY MEDICINE
Payer: OTHER GOVERNMENT

## 2025-02-07 VITALS
TEMPERATURE: 98 F | DIASTOLIC BLOOD PRESSURE: 96 MMHG | OXYGEN SATURATION: 96 % | HEART RATE: 90 BPM | SYSTOLIC BLOOD PRESSURE: 138 MMHG | RESPIRATION RATE: 18 BRPM

## 2025-02-07 VITALS
OXYGEN SATURATION: 95 % | RESPIRATION RATE: 16 BRPM | DIASTOLIC BLOOD PRESSURE: 81 MMHG | SYSTOLIC BLOOD PRESSURE: 110 MMHG | TEMPERATURE: 98 F | HEART RATE: 101 BPM
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
## 2025-02-07 DIAGNOSIS — S01.112A LACERATION WITHOUT FOREIGN BODY OF LEFT EYELID AND PERIOCULAR AREA, INITIAL ENCOUNTER: ICD-10-CM
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
## 2025-02-07 DIAGNOSIS — Z23 ENCOUNTER FOR IMMUNIZATION: ICD-10-CM

## 2025-02-07 DIAGNOSIS — Z88.0 ALLERGY STATUS TO PENICILLIN: ICD-10-CM

## 2025-02-07 DIAGNOSIS — Z91.013 ALLERGY TO SEAFOOD: ICD-10-CM

## 2025-02-07 DIAGNOSIS — K38.9 DISEASE OF APPENDIX, UNSPECIFIED: Chronic | ICD-10-CM

## 2025-02-07 PROBLEM — F10.20 ALCOHOL DEPENDENCE, UNCOMPLICATED: Chronic | Status: ACTIVE | Noted: 2024-09-16

## 2025-02-07 PROCEDURE — 99284 EMERGENCY DEPT VISIT MOD MDM: CPT | Mod: 25

## 2025-02-07 PROCEDURE — 72125 CT NECK SPINE W/O DYE: CPT | Mod: 26

## 2025-02-07 PROCEDURE — 12013 RPR F/E/E/N/L/M 2.6-5.0 CM: CPT

## 2025-02-07 PROCEDURE — 90715 TDAP VACCINE 7 YRS/> IM: CPT

## 2025-02-07 PROCEDURE — 99053 MED SERV 10PM-8AM 24 HR FAC: CPT

## 2025-02-07 PROCEDURE — 70450 CT HEAD/BRAIN W/O DYE: CPT | Mod: MC

## 2025-02-07 PROCEDURE — 90471 IMMUNIZATION ADMIN: CPT

## 2025-02-07 PROCEDURE — 72125 CT NECK SPINE W/O DYE: CPT | Mod: MC

## 2025-02-07 PROCEDURE — 70450 CT HEAD/BRAIN W/O DYE: CPT | Mod: 26

## 2025-02-07 RX ORDER — CLOSTRIDIUM TETANI TOXOID ANTIGEN (FORMALDEHYDE INACTIVATED), CORYNEBACTERIUM DIPHTHERIAE TOXOID ANTIGEN (FORMALDEHYDE INACTIVATED), BORDETELLA PERTUSSIS TOXOID ANTIGEN (GLUTARALDEHYDE INACTIVATED), BORDETELLA PERTUSSIS FILAMENTOUS HEMAGGLUTININ ANTIGEN (FORMALDEHYDE INACTIVATED), BORDETELLA PERTUSSIS PERTACTIN ANTIGEN, AND BORDETELLA PERTUSSIS FIMBRIAE 2/3 ANTIGEN 5; 2; 2.5; 5; 3; 5 [LF]/.5ML; [LF]/.5ML; UG/.5ML; UG/.5ML; UG/.5ML; UG/.5ML
0.5 INJECTION, SUSPENSION INTRAMUSCULAR ONCE
Refills: 0 | Status: COMPLETED | OUTPATIENT
Start: 2025-02-07 | End: 2025-02-07

## 2025-02-07 RX ORDER — CHLORDIAZEPOXIDE HYDROCHLORIDE 25 MG/1
50 CAPSULE ORAL ONCE
Refills: 0 | Status: DISCONTINUED | OUTPATIENT
Start: 2025-02-07 | End: 2025-02-07

## 2025-02-07 RX ADMIN — CHLORDIAZEPOXIDE HYDROCHLORIDE 50 MILLIGRAM(S): 25 CAPSULE ORAL at 05:00

## 2025-02-07 RX ADMIN — CLOSTRIDIUM TETANI TOXOID ANTIGEN (FORMALDEHYDE INACTIVATED), CORYNEBACTERIUM DIPHTHERIAE TOXOID ANTIGEN (FORMALDEHYDE INACTIVATED), BORDETELLA PERTUSSIS TOXOID ANTIGEN (GLUTARALDEHYDE INACTIVATED), BORDETELLA PERTUSSIS FILAMENTOUS HEMAGGLUTININ ANTIGEN (FORMALDEHYDE INACTIVATED), BORDETELLA PERTUSSIS PERTACTIN ANTIGEN, AND BORDETELLA PERTUSSIS FIMBRIAE 2/3 ANTIGEN 0.5 MILLILITER(S): 5; 2; 2.5; 5; 3; 5 INJECTION, SUSPENSION INTRAMUSCULAR at 04:08

## 2025-02-07 NOTE — ED ADULT NURSE REASSESSMENT NOTE - NSFALLRISKINTERV_ED_ALL_ED
Assistance OOB with selected safe patient handling equipment if applicable/Assistance with ambulation/Communicate fall risk and risk factors to all staff, patient, and family/Monitor gait and stability/Monitor for mental status changes and reorient to person, place, and time, as needed/Provide visual cue: yellow wristband, yellow gown, etc/Reinforce activity limits and safety measures with patient and family/Toileting schedule using arm’s reach rule for commode and bathroom/Use of alarms - bed, stretcher, chair and/or video monitoring/Call bell, personal items and telephone in reach/Instruct patient to call for assistance before getting out of bed/chair/stretcher/Non-slip footwear applied when patient is off stretcher/[place] to call system/Physically safe environment - no spills, clutter or unnecessary equipment/Purposeful Proactive Rounding/Room/bathroom lighting operational, light cord in reach

## 2025-02-07 NOTE — ED ADULT NURSE NOTE - NSFALLRISKINTERV_ED_ALL_ED
Assistance OOB with selected safe patient handling equipment if applicable/Assistance with ambulation/Communicate fall risk and risk factors to all staff, patient, and family/Monitor gait and stability/Monitor for mental status changes and reorient to person, place, and time, as needed/Provide visual cue: yellow wristband, yellow gown, etc/Reinforce activity limits and safety measures with patient and family/Toileting schedule using arm’s reach rule for commode and bathroom/Use of alarms - bed, stretcher, chair and/or video monitoring/Call bell, personal items and telephone in reach/Instruct patient to call for assistance before getting out of bed/chair/stretcher/Non-slip footwear applied when patient is off stretcher/[place] to call system/Physically safe environment - no spills, clutter or unnecessary equipment/Purposeful Proactive Rounding/Room/bathroom lighting operational, light cord in reach

## 2025-02-07 NOTE — ED ADULT NURSE REASSESSMENT NOTE - NS ED NURSE REASSESS COMMENT FT1
Patient is able to walk safely without difficulty. Patient walking around the ER steadily. Dr. Yuan notified and stated okay to discharge at this time.

## 2025-02-07 NOTE — ED ADULT NURSE REASSESSMENT NOTE - NS ED NURSE REASSESS COMMENT FT1
Bedside report received from LANETTE Quiroz. Patient ambulated to bathroom with an unsteady gait. Patient assisted back to stretcher, bed alarm placed on stretcher. Patient changed into yellow gown and red socks. Vital signs obtained and documented. Safety maintained.

## 2025-02-07 NOTE — ED ADULT NURSE NOTE - OBJECTIVE STATEMENT
Pt is 54y male, A&Ox3, breathing unlabored, Presents s/p fall. Pt endorses drinking alcohol. PT states he fell into the wall and that he made a hole in the wall with his face.

## 2025-02-07 NOTE — ED PROVIDER NOTE - OBJECTIVE STATEMENT
55yo male history of EtOH dependence brought in by EMS status post fall, Fell down several steps.  Hit head.  Does not think he lost consciousness.  Happened just prior to arrival.  Per EMS patient dented the wall about 3 steps from the bottom.  Total of 7 steps in the flight but it is unclear how many patient fell down.

## 2025-02-07 NOTE — ED PROVIDER NOTE - NSFOLLOWUPINSTRUCTIONS_ED_ALL_ED_FT
Please refrain from alcohol overuse.     Please keep sutured area clean and dry. Your stitches will dissolve in about one week. Return to ED if increased pain or redness around laceration. Please refrain from alcohol overuse.     Please keep sutured area clean and dry. Your stitches will dissolve in about one week. Return to ED if increased pain or redness around laceration.    Return to the Emergency Department for worsening or persistent symptoms, and/or ANY NEW OR CONCERNING SYMPTOMS. If you have issues obtaining follow up, please call: [phone] or [phone]  to obtain a doctor or specialist who takes your insurance in your area.    Head Injury    WHAT YOU NEED TO KNOW:    A head injury is most often caused by a blow to the head. This may occur from a fall, bicycle injury, sports injury, being struck in the head, or a motor vehicle accident.     DISCHARGE INSTRUCTIONS:    Call 911 or have someone else call for any of the following:     You cannot be woken.      You have a seizure.      You stop responding to others or you faint.      You have blurry or double vision.      Your speech becomes slurred or confused.      You have arm or leg weakness, loss of feeling, or new problems with coordination.      Your pupils are larger than usual or one pupil is a different size than the other.       You have blood or clear fluid coming out of your ears or nose.    Return to the emergency department if:     You have repeated or forceful vomiting.      You feel confused.      Your headache gets worse or becomes severe.      You or someone caring for you notices that you are harder to wake than usual.    Contact your healthcare provider if:     Your symptoms last longer than 6 weeks after the injury.      You have questions or concerns about your condition or care.    Medicines:     Acetaminophen decreases pain. Acetaminophen is available without a doctor's order. Ask how much to take and how often to take it. Follow directions. Acetaminophen can cause liver damage if not taken correctly.      Take your medicine as directed. Contact your healthcare provider if you think your medicine is not helping or if you have side effects. Tell him or her if you are allergic to any medicine. Keep a list of the medicines, vitamins, and herbs you take. Include the amounts, and when and why you take them. Bring the list or the pill bottles to follow-up visits. Carry your medicine list with you in case of an emergency.    Self-care:     Rest or do quiet activities for 24 to 48 hours. Limit your time watching TV, using the computer, or doing tasks that require a lot of thinking. Slowly return to your normal activities as directed. Do not play sports or do activities that may cause you to get hit in the head. Ask your healthcare provider when you can return to sports.       Apply ice on your head for 15 to 20 minutes every hour or as directed. Use an ice pack, or put crushed ice in a plastic bag. Cover it with a towel before you apply it to your skin. Ice helps prevent tissue damage and decreases swelling and pain.       Have someone stay with you for 24 hours or as directed. This person can monitor you for complications and call 911. When you are awake the person should ask you a few questions to see if you are thinking clearly. An example would be to ask your name or your address.     Prevent another head injury:     Wear a helmet that fits properly. Do this when you play sports, or ride a bike, scooter, or skateboard. Helmets help decrease your risk of a serious head injury. Talk to your healthcare provider about other ways you can protect yourself if you play sports.      Wear your seat belt every time you are in a car. This helps to decrease your risk for a head injury if you are in a car accident.     Follow up with your healthcare provider as directed: Write down your questions so you remember to ask them during your visits.    Laceration    WHAT YOU NEED TO KNOW:    A laceration is an injury to the skin and the soft tissue underneath it. Lacerations happen when you are cut or hit by something. They can happen anywhere on the body.     DISCHARGE INSTRUCTIONS:    Return to the emergency department if:     You have heavy bleeding or bleeding that does not stop after 10 minutes of holding firm, direct pressure over the wound.       Your wound opens up.     Contact your healthcare provider if:     You have a fever or chills.       Your laceration is red, warm, or swollen.      You have red streaks on your skin coming from your wound.      You have white or yellow drainage from the wound that smells bad.      You have pain that gets worse, even after treatment.       You have questions or concerns about your condition or care.     Medicines:     Prescription pain medicine may be given. Ask how to take this medicine safely.       Antibiotics help treat or prevent a bacterial infection.       Take your medicine as directed. Contact your healthcare provider if you think your medicine is not helping or if you have side effects. Tell him or her if you are allergic to any medicine. Keep a list of the medicines, vitamins, and herbs you take. Include the amounts, and when and why you take them. Bring the list or the pill bottles to follow-up visits. Carry your medicine list with you in case of an emergency.    Care for your wound as directed:     Do not get your wound wet until your healthcare provider says it is okay. Do not soak your wound in water. Do not go swimming until your healthcare provider says it is okay. Carefully wash the wound with soap and water. Gently pat the area dry or allow it to air dry.       Change your bandages when they get wet, dirty, or after washing. Apply new, clean bandages as directed. Do not apply elastic bandages or tape too tight. Do not put powders or lotions over your incision.       Apply antibiotic ointment as directed. Your healthcare provider may give you antibiotic ointment to put over your wound if you have stitches. If you have strips of tape over your incision, let them dry up and fall off on their own. If they do not fall off within 14 days, gently remove them. If you have glue over your wound, do not remove or pick at it. If your glue comes off, do not replace it with glue that you have at home.       Check your wound every day for signs of infection such as swelling, redness, or pus.     Self-care:     Apply ice on your wound for 15 to 20 minutes every hour or as directed. Use an ice pack, or put crushed ice in a plastic bag. Cover it with a towel. Ice helps prevent tissue damage and decreases swelling and pain.      Use a splint as directed. A splint will decrease movement and stress on your wound. It may help it heal faster. A splint may be used for lacerations over joints or areas of your body that bend. Ask your healthcare provider how to apply and remove a splint.       Decrease scarring of your wound by applying ointments as directed. Do not apply ointments until your healthcare provider says it is okay. You may need to wait until your wound is healed. Ask which ointment to buy and how often to use it. After your wound is healed, use sunscreen over the area when you are out in the sun. You should do this for at least 6 months to 1 year after your injury.     Follow up with your healthcare provider as directed: You may need to follow up in 24 to 48 hours to have your wound checked for infection. You will need to return in 3 to 14 days if you have stitches or staples so they can be removed. Care for your wound as directed to prevent infection and help it heal. Write down your questions so you remember to ask them during your visits.

## 2025-02-07 NOTE — ED ADULT NURSE REASSESSMENT NOTE - NSFALLCONCLUSION_ED_ALL_ED
Fall Risk Cephalexin Pregnancy And Lactation Text: This medication is Pregnancy Category B and considered safe during pregnancy.  It is also excreted in breast milk but can be used safely for shorter doses.

## 2025-02-07 NOTE — ED PROVIDER NOTE - CLINICAL SUMMARY MEDICAL DECISION MAKING FREE TEXT BOX
patient w/ facial laceration, will repair. patient with fall with head trauma, will CT rule out intracranial hemorrhage.

## 2025-02-07 NOTE — ED PROVIDER NOTE - PATIENT PORTAL LINK FT
You can access the FollowMyHealth Patient Portal offered by [facility] by registering at the following website: [email or web address]. By joining [employer]’s FollowMyHealth portal, you will also be able to view your health information using other applications (apps) compatible with our system.

## 2025-02-07 NOTE — ED ADULT TRIAGE NOTE - CHIEF COMPLAINT QUOTE
Pt presents from home s/p fall down approximately 3 steps. +ETOH +head strike -blood thinners unknown LOC. Laceration noted to L eyebrow, bleeding controlled, wrapped with gauze and kerlix. NA called [phone], taken directly to CT.

## 2025-02-10 ENCOUNTER — EMERGENCY (EMERGENCY)
Facility: HOSPITAL | Age: 55
LOS: 0 days | Discharge: HOME | End: 2025-02-10
Attending: EMERGENCY MEDICINE
Payer: OTHER GOVERNMENT

## 2025-02-10 VITALS
TEMPERATURE: 99 F | OXYGEN SATURATION: 100 % | HEIGHT: 70 IN | DIASTOLIC BLOOD PRESSURE: 93 MMHG | RESPIRATION RATE: 18 BRPM | SYSTOLIC BLOOD PRESSURE: 141 MMHG | HEART RATE: 82 BPM

## 2025-02-10 VITALS
TEMPERATURE: 98 F | SYSTOLIC BLOOD PRESSURE: 150 MMHG | RESPIRATION RATE: 18 BRPM | HEART RATE: 80 BPM | DIASTOLIC BLOOD PRESSURE: 98 MMHG | OXYGEN SATURATION: 100 %

## 2025-02-10 DIAGNOSIS — Z91.013 ALLERGY TO SEAFOOD: ICD-10-CM

## 2025-02-10 DIAGNOSIS — L03.113 CELLULITIS OF RIGHT UPPER LIMB: ICD-10-CM

## 2025-02-10 DIAGNOSIS — Z88.0 ALLERGY STATUS TO PENICILLIN: ICD-10-CM

## 2025-02-10 DIAGNOSIS — K38.9 DISEASE OF APPENDIX, UNSPECIFIED: Chronic | ICD-10-CM

## 2025-02-10 PROCEDURE — 99284 EMERGENCY DEPT VISIT MOD MDM: CPT

## 2025-02-10 PROCEDURE — 99283 EMERGENCY DEPT VISIT LOW MDM: CPT | Mod: 25

## 2025-02-10 PROCEDURE — 73080 X-RAY EXAM OF ELBOW: CPT | Mod: 26,50

## 2025-02-10 PROCEDURE — 73080 X-RAY EXAM OF ELBOW: CPT | Mod: 50

## 2025-02-10 RX ORDER — CEPHALEXIN 500 MG
500 CAPSULE ORAL EVERY 12 HOURS
Refills: 0 | Status: DISCONTINUED | OUTPATIENT
Start: 2025-02-10 | End: 2025-02-10

## 2025-02-10 RX ORDER — CEPHALEXIN 500 MG
1 CAPSULE ORAL
Qty: 40 | Refills: 0
Start: 2025-02-10 | End: 2025-02-19

## 2025-02-10 RX ORDER — ASPIRIN 81 MG/1
1 TABLET, COATED ORAL
Qty: 30 | Refills: 0
Start: 2025-02-10 | End: 2025-03-11

## 2025-02-10 RX ADMIN — Medication 500 MILLIGRAM(S): at 16:58

## 2025-02-10 NOTE — ED STATDOCS - PROGRESS NOTE DETAILS
Patient is a 54y male w/ a EtOH dependence who presents to the ED for R sided elbow injury. Patient reports he fell 2.5 weeks ago down multiple steps and injured multiple parts of his body, including his head and b/l arms. At that time, they repaired a facial laceration and r/o a ICH, was subsequently discharged. Notes that yesterday his R elbow started draining "viscous, clear-singh" discharge.  Bridgett Montenegro PA-C Small puncture wound to right elbow with mild surrounding erythema.  Neg. lymphangitis.  Will treat with Keflex.  Bridgett Montenegro PA-C Knee immobilizer placed by orthopedic team and to follow up with Dr. Lopez, orthopedics.   ASA 325mg daily. Pt. aware of no weight bearing.  Bridgett Montenegro PA-C

## 2025-02-10 NOTE — ED STATDOCS - CPE ED RESP NORM
[address] [place] [address]    Coronary Angiogram (Radial/Ulnar Approach) After Care    Refer to this sheet in the next few weeks. These instructions provide you with information on caring for yourself after your procedure. Your caregiver may also give you more specific instructions. Your treatment has been planned according to current medical practices, but problems sometimes occur. Call your caregiver if you have any problems or questions after your procedure.    Home Care Instructions:  · You may shower the day after the procedure. Remove the bandage (dressing) and gently wash the site with plain soap and water. Gently pat the site dry. You may apply a band aid daily for 2 days if desired.    · Do not apply powder or lotion to the site.  · Do not submerge the affected site in water for 3 to 5 days or until the site is completely healed.   · Do not lift, push or pull anything over 10 pounds for 2 days after your procedure.  · Inspect the site at least twice daily. You may notice some bruising at the site and it may be tender for 1 to 2 weeks.     · Increase your fluid intake for the next 2 days.    · Keep arm elevated for 24 hours. For the remainder of the day, keep your arm in “Pledge of Allegiance” position when up and about.     · You may drive 24 hours after the procedure unless otherwise instructed by your caregiver.  · Do not operate machinery or power tools for 24 hours.  · A responsible adult should be with you for the first 24 hours after you arrive home. Do not make any important legal decisions or sign legal papers for 24 hours.  Do not drink alcohol for 24 hours.    · Metformin or any medications containing Metformin should not be taken for 48 hours after your procedure.      Call Your Doctor if:   · You have unusual pain at the radial/ulnar (wrist) site.  · You have redness, warmth, swelling, or pain at the radial/ulnar (wrist) site.  · You have drainage (other  than a small amount of blood on the dressing).  · You have chills or a fever > 101.  · Your arm becomes pale or dark, cool, tingly, or numb.  · You have heavy bleeding from the site, hold pressure on the site for 20 minutes.  If the bleeding stops, apply a fresh bandage and call your cardiologist.  However, if you continue to have bleeding, call 911.         Render Post-Care Instructions In Note?: no Medical Necessity Information: It is in your best interest to select a reason for this procedure from the list below. All of these items fulfill various CMS LCD requirements except the new and changing color options. Detail Level: Detailed Treatment Number (Will Not Render If 0): 0 Post-Care Instructions: I reviewed with the patient in detail post-care instructions. Patient is to wear sunprotection, and avoid picking at any of the treated lesions. Pt may apply Vaseline to crusted or scabbing areas. Anesthesia Volume In Cc: 0.2 Medical Necessity Clause: This procedure was medically necessary because the lesions that were treated were: Consent: The patient's consent was obtained including but not limited to risks of crusting, scabbing, blistering, scarring, darker or lighter pigmentary change, recurrence, incomplete removal and infection. normal...

## 2025-02-10 NOTE — ED ADULT TRIAGE NOTE - CHIEF COMPLAINT QUOTE
Pt ambulatory to the ED with c/o right sided elbow pain/injury. Pt seen in [facility] last week. Pt reports he fell 2.5 weeks ago. Pt endorses "there's a lot of liquid coming out of my elbow". Allergies in chart. Denies fevers.

## 2025-02-10 NOTE — ED STATDOCS - NSFOLLOWUPINSTRUCTIONS_ED_ALL_ED_FT
Knee Immobilizer    WHAT YOU NEED TO KNOW:    How might a knee immobilizer help me? A knee immobilizer limits knee movement. It is used after an injury or surgery to help your knee, muscles, or tendons heal.    How do I safely use a knee immobilizer?    Have your knee immobilizer fitted by your healthcare provider. It is important that your knee immobilizer is the right size for you and that it fits properly.    Wear your knee immobilizer as directed. It can be worn over your clothing. Check the fit of the knee immobilizer often. If it does not fit properly or slips out of place, it could cause further injury.    Use crutches as directed. You may need to avoid putting weight on your injured leg. Your healthcare provider will tell you if you need crutches and for how long.    Inspect your knee immobilizer often. Do not wear your knee immobilizer if it is damaged or broken. You may need to replace it if it becomes worn.    Ask your healthcare provider how to care for your knee immobilizer. You may be able to hand wash the fabric with mild soap and water. Do not place it in the washer or dryer.    Go to physical therapy as directed. A physical therapist can help you strengthen the muscles in your leg and help your knee heal.  When should I contact my healthcare provider?    Your knee pain becomes worse when you wear your knee immobilizer.    Your skin is sore or raw after you wear your knee immobilizer.    Your leg feels numb or swells while you wear your knee immobilizer.    Your knee immobilizer is damaged.    You have questions or concerns about your condition or care.  When should I seek immediate care or call 911?    You have severe swelling or pain in your leg or knee.    CARE AGREEMENT:    You have the right to help plan your care. Learn about your health condition and how it may be treated. Discuss treatment options with your healthcare providers to decide what care you want to receive. You always have the right to refuse treatment.    Knee Sprain, Adult  A person's knee joint, with a close-up of the ligament.  A knee sprain is a stretch or tear in a knee ligament. Knee ligaments are tissues that connect the bones of the knee joint to each other.    What are the causes?  This condition often results from:  A fall.  An injury to the knee.  What are the signs or symptoms?  Symptoms of this condition include:  Trouble straightening or bending the leg.  Swelling in the knee.  Bruising around the knee.  Tenderness or pain in the knee.  Sudden muscle tightening (spasm) around the knee.  How is this diagnosed?  This condition may be diagnosed based on:  A physical exam.  A history of what happened just before you started to have symptoms.  Tests. These may include:  An X-ray. This may be done to make sure no bones are broken or moved out of position (dislocated).  An MRI. This may be done to check if any of the ligaments are torn and to check for other damage.  A physical exam that includes stress testing of the knee. This may be done to check for damage to ligaments.  How is this treated?  Treatment for this condition may involve:  Keeping the knee in a straightened position (immobilized) with a cast, brace, or splint.  Applying ice to the knee. This helps with pain and swelling.  Raising (elevating) the knee above the level of your heart when you are resting. This helps with pain and swelling.  Taking medicine for pain.  Doing exercises to prevent or limit long-term weakness or stiffness in your knee.  Having surgery to reconnect ligaments to the bone or to reconstruct ligaments. This may be needed if one or more ligaments are fully torn.  Follow these instructions at home:  If you have a removable splint or brace:    Wear the splint or brace as told by your health care provider. Remove it only as told by your health care provider.  Check the skin around the splint or brace every day. Tell your health care provider about any concerns.  Loosen the splint or brace if your toes tingle, become numb, or turn cold and blue.  Keep the splint or brace clean and dry.  If you have a nonremovable cast:    Do not put pressure on any part of the cast until it is fully hardened. This may take several hours.  Do not stick anything inside the cast to scratch your skin. Doing that increases your risk of infection.  Check the skin around the cast every day. Tell your health care provider about any concerns.  You may put lotion on dry skin around the edges of the cast. Do not put lotion on the skin underneath the cast.  Keep the cast clean and dry.  Bathing    If the splint, brace, or cast is not waterproof:  Do not let it get wet.  Cover it with a watertight covering when you take a bath or a shower.  Managing pain, stiffness, and swelling    A bag of ice on a towel on the skin.   If directed, put ice on the injured area. To do this:  If you have a removable splint or brace, remove it as told by your health care provider.  Put ice in a plastic bag.  Place a towel between your skin and the bag or between your cast and the bag.  Leave the ice on for 20 minutes, 2–3 times a day.  If your skin turns bright red, remove the ice right away to prevent skin damage. The risk of skin damage is higher if you cannot feel pain, heat, or cold.  Move your toes often to reduce stiffness and swelling.  Elevate the injured area above the level of your heart while you are sitting or lying down.  General instructions    Take over-the-counter and prescription medicines only as told by your health care provider.  Do not use any products that contain nicotine or tobacco. These products include cigarettes, chewing tobacco, and vaping devices, such as e-cigarettes. These can delay healing. If you need help quitting, ask your health care provider.  Do exercises as told by your health care provider.  Contact a health care provider if:  You have pain that gets worse.  The cast, brace, or splint does not fit right or gets damaged.  Get help right away if:  You cannot use your injured knee to support any of your body weight (cannot bear weight).  You cannot move the injured joint.  You cannot walk more than a few steps without pain or without your knee buckling.  You have a lot of pain, swelling, or numbness in the leg below the cast, brace, or splint.  Your foot or toes are numb, cold, or blue after you loosen your splint or brace.  This information is not intended to replace advice given to you by your health care provider. Make sure you discuss any questions you have with your health care provider.    Nondisplaced Tibial Plateau Fracture    A tibial plateau fracture is a break in the top of the shin bone (tibia). The top of the tibia has a flat, smooth surface (tibial plateau). This part of the tibia is softer than the rest of the bone. It forms the bottom of the knee joint. If a strong force pushes the thigh bone (femur) down onto the tibial plateau, the tibial plateau can collapse or break apart at the edges. This may also be called an intra-articular fracture.    A nondisplaced fracture means that the broken pieces of bone have not moved out of their normal position. This type of fracture can usually be treated without surgery.    What are the causes?  Common causes of this type of fracture include:  Car accidents.  Jumps or falls from a significant height.  Injuries from activities that put a lot of force on the knee, such as injuries from skiing, mountain biking, or contact sports.  What increases the risk?  You may be at higher risk for this type of fracture if:  You play sports that put a lot of force on your knee, including contact sports.  You have a history of bone infections.  You are an older person with a condition that causes weak bones (osteoporosis).  What are the signs or symptoms?  Symptoms of a nondisplaced tibial plateau fracture may include:  Pain that gets worse when putting weight on your knee or moving your knee.  Knee swelling and bruising.  The knee having an abnormal shape (deformity).  How is this diagnosed?  This condition may be diagnosed based on:  Your symptoms and medical history. Your health care provider may ask about recent knee or leg injuries you have had.  A physical exam.  Imaging tests, such as X-rays, a CT scan, or an MRI.  How is this treated?  This condition is treated by wearing a brace on your leg. You will not be able to put any body weight on the leg (weight-bearing restrictions). You may be given crutches, a scooter, a walker, or a wheelchair to help you move around. Treatment may also involve:  Prescription pain medicine.  Physical therapy.  Follow these instructions at home:  Medicines    Take over-the-counter and prescription medicines only as told by your health care provider.  Ask your health care provider if the medicine prescribed to you:  Requires you to avoid driving or using machinery.  Can cause constipation. You may need to take these actions to prevent or treat constipation:  Drink enough fluid to keep your urine pale yellow.  Take over-the-counter or prescription medicines.  Eat foods that are high in fiber, such as beans, whole grains, and fresh fruits and vegetables.  Limit foods that are high in fat and processed sugars, such as fried or sweet foods.  If you have a splint or brace:    Wear the splint or brace as told by your health care provider. Remove it only as told by your health care provider.  Loosen the splint or brace if your toes tingle, become numb, or turn cold and blue.  Keep the splint or brace clean and dry.  If you have a cast:    Do not put pressure on any part of the cast until it is fully hardened. This may take several hours.  Do not stick anything inside the cast to scratch your skin. Doing that increases your risk of infection.  Check the skin around the cast every day. Tell your health care provider about any concerns.  You may put lotion on dry skin around the edges of the cast. Do not put lotion on the skin underneath the cast.  Keep the cast clean and dry.  Activity    Do not use the injured limb to support your body weight until your health care provider says that you can. Use crutches, a cane, or a walker as told by your health care provider.  Return to your normal activities as told by your health care provider. Ask your health care provider what activities are safe for you.  Do exercises as told by your health care provider.  Ask your health care provider when it is safe to drive if you have a splint, brace, or a cast on your leg.  Managing pain, stiffness, and swelling      If directed, put ice on the injured area. To do this:  If you have a removable splint or brace, remove it as told by your health care provider.  Put ice in a plastic bag.  Place a towel between your skin and the bag or between your splint or cast and the bag.  Leave the ice on for 20 minutes, 2–3 times a day.  Remove the ice if your skin turns bright red. This is very important. If you cannot feel pain, heat, or cold, you have a greater risk of damage to the area.  Move your toes and ankle often to reduce stiffness and swelling.  Raise (elevate) the injured area above the level of your heart while you are sitting or lying down.  General instructions    Do not take baths, swim, or use a hot tub until your health care provider approves. Ask your health care provider if you may take showers. You may only be allowed to take sponge baths.  Do not use any products that contain nicotine or tobacco, such as cigarettes, e-cigarettes, and chewing tobacco. These can delay bone healing. If you need help quitting, ask your health care provider.  Keep all follow-up visits. This is important.  Contact a health care provider if you:  Have pain that does not get better with medicine.  Get help right away if:  You have severe pain or swelling.  You have new pain, swelling, or warmth in your lower leg.  Your toes or foot:  Are unusually cold.  Turn a bluish color.  Are numb.  You have chest pain.  You have difficulty breathing.  Summary  A tibial plateau fracture is a break in the top of the shin bone (tibia), which forms the bottom of the knee joint.  A nondisplaced fracture means that the broken pieces of bone have not moved out of their normal position.  Do not use your leg to support your body weight until your health care provider says that you can. Follow weight-bearing restrictions.  Keep all follow-up visits. This is important.  This information is not intended to replace advice given to you by your health care provider. Make sure you discuss any questions you have with your health care provider. Cellulitis, Adult  A person's legs and feet. One leg is normal and the other leg is affected by cellulitis.  Cellulitis is a skin infection. The infected area is usually warm, red, swollen, and tender. It most commonly occurs on the lower body, such as the legs, feet, and toes, but this condition can occur on any part of the body. The infection can travel to the muscles, blood, and underlying tissue and become life-threatening without treatment. It is important to get medical treatment right away for this condition.    What are the causes?  Cellulitis is caused by bacteria. The bacteria enter through a break in the skin, such as a cut, burn, insect or animal bite, open sore, or crack.    What increases the risk?  This condition is more likely to occur in people who:  Have a weak body's defense system (immune system).  Are older than 60 years old.  Have diabetes.  Have a type of long-term (chronic) liver disease (cirrhosis) or kidney disease.  Are obese.  Have a skin condition such as:  An itchy rash, such as eczema or psoriasis.  A fungal rash on the feet or in skinfolds.  Blistering rashes, such as shingles or chickenpox.  Slow movement of blood in the veins (venous stasis).  Fluid buildup below the skin (edema).  Have open wounds on the skin, such as cuts, puncture wounds, burns, bites, scrapes, tattoos, piercings, or wounds from surgery.  Have had radiation therapy.  Use IV drugs.  What are the signs or symptoms?  Symptoms of this condition include:  Skin that looks red, purple, or slightly darker than your usual skin color.  Streaks or spots on the skin.  Swollen area of the skin.  Tenderness or pain when an area of the skin is touched.  Warm skin.  Fever or chills.  Blisters.  Tiredness (fatigue).  How is this diagnosed?  This condition is diagnosed based on a medical history and physical exam. You may also have tests, including:  Blood tests.  Imaging tests.  Tests on a sample of fluid taken from the wound (wound culture).  How is this treated?  Treatment for this condition may include:  Medicines. These may include antibiotics or medicines to treat allergies (antihistamines).  Rest.  Applying cold or warm wet cloths (compresses) to the skin.  If the condition is severe, you may need to stay in the hospital and get antibiotics through an IV.  The infection usually starts to get better within 1–2 days of treatment.    Follow these instructions at home:  Medicines    Take over-the-counter and prescription medicines only as told by your health care provider.  If you were prescribed antibiotics, take them as told by your provider. Do not stop using the antibiotic even if you start to feel better.  General instructions    Drink enough fluid to keep your pee (urine) pale yellow.  Do not touch or rub the infected area.  Raise (elevate) the infected area above the level of your heart while you are sitting or lying down.  Return to your normal activities as told by your provider. Ask your provider what activities are safe for you.  Apply warm or cold compresses to the affected area as told by your provider.  Keep all follow-up visits. Your provider will need to make sure that a more serious infection is not developing.  Contact a health care provider if:  You have a fever.  Your symptoms do not improve within 1–2 days of starting treatment or you develop new symptoms.  Your bone or joint underneath the infected area becomes painful after the skin has healed.  Your infection returns in the same area or another area. Signs of this may include:  You notice a swollen bump in the infected area.  Your red area gets larger, turns dark in color, or becomes more painful.  Drainage increases.  Pus or a bad smell develops in your infected area.  You have more pain.  You feel ill and have muscle aches and weakness.  You develop vomiting or diarrhea that will not go away.  Get help right away if:  You notice red streaks coming from the infected area.  You notice the skin turns purple or black and falls off.  This symptom may be an emergency. Get help right away. Call 911.  Do not wait to see if the symptom will go away.  Do not drive yourself to the hospital.  This information is not intended to replace advice given to you by your health care provider. Make sure you discuss any questions you have with your health care provider.

## 2025-02-10 NOTE — ED STATDOCS - CARE PROVIDER_API CALL
Theo Lopez  [employer]  [address] [place] [address]  Phone: [phone]  Fax: [phone]  Follow Up Time:    Lionel Ramírez  Orthopaedic Surgery  [address] [place] [address]  Phone: [phone]  Fax: [phone]  Established Patient  Follow Up Time:

## 2025-02-10 NOTE — ED STATDOCS - OBJECTIVE STATEMENT
Patient is a 54y male w/ a EtOH dependence who presents to the ED for R sided elbow injury. Patient reports he fell 2.5 weeks ago down multiple steps and injured multiple parts of his body, including his head and b/l arms. At that time, they repaired a facial laceration and r/o a ICH, was subsequently discharged. Notes that yesterday his R elbow started draining "viscous, clear-singh" discharge.

## 2025-02-10 NOTE — ED ADULT NURSE NOTE - OBJECTIVE STATEMENT
Pt fell 3 weeks ago open wound to right elbow o.5cm opening clear drainage noticed today. no redness noticed.

## 2025-02-10 NOTE — ED ADULT NURSE NOTE - NSFALLRISKINTERV_ED_ALL_ED

## 2025-02-10 NOTE — ED STATDOCS - SKIN, MLM
Old bruising to the L face. Open wound to the skin of the R elbow. No drainage, discharge, or foreign body.

## 2025-02-10 NOTE — ED STATDOCS - PATIENT PORTAL LINK FT
You can access the FollowMyHealth Patient Portal offered by [facility] by registering at the following website: [email or web address]. By joining [employer]’s FollowMyHealth portal, you will also be able to view your health information using other applications (apps) compatible with our system.

## 2025-02-10 NOTE — ED STATDOCS - CLINICAL SUMMARY MEDICAL DECISION MAKING FREE TEXT BOX
X-ray were performed no acute findings on my independent interpretation.  Patient with small area of cellulitis, no abscess.  Patient be given oral antibiotics, discharged home in good condition recommend close outpatient follow-up.  Strict turn precautions given for any worsening.  Patient verbalized understanding and agreed to plan.

## 2025-02-25 ENCOUNTER — APPOINTMENT (OUTPATIENT)
Facility: CLINIC | Age: 55
End: 2025-02-25

## 2025-03-26 NOTE — ED ADULT NURSE NOTE - IS PATIENT ABLE TO BE SCREENED?
no wheezing/no dyspnea/no cough Yes no wheezing/no dyspnea/no cough/no hemoptysis/no pleuritic chest pain

## 2025-04-29 NOTE — PATIENT PROFILE ADULT - LEGAL HELP
[TextEntry] : Except as noted above... Constitutional: The patient denied headache, fatigue, fever, sweats, loss of appetite or chills Eyes: The patient denied double vision, eye pain, eye discharge, red eyes or itchy eyes ENT: The patient denied ear pain, ear discharge, nasal congestion, nasal discharge, sore throat, enlarged tonsils, hoarseness, neck pain or neck swelling Cardiovascular: The patient denied chest pain, chest discomfort, dizziness, palpitations, fainting, lower extremity swelling or leg cramps Respiratory: The patient denied shortness of breath, cough, coughing up blood, wheezing, chest congestion or mucous production GI: The patient denied weight gain, weight loss, abdominal pain, nausea, vomiting, diarrhea, constipation, black stools or bloody stools : The patient denied pain on urination, burning with urination, frequent urination or blood in the urine Skin: The patient denied rashes, redness or swelling Neurologic: The patient denied headache, stiff neck, weakness, numbness, difficulty speaking, unsteadiness or numbness/tingling in feet Psychiatric: The patient denied hallucinations, agitation or disorientation Endocrine: The patient denied excessive thirst, excessive urination, cold intolerance or heat intolerance Hematologic: The patient denied easy bruisability or pallor Allergic/Immunologic: The patient denied runny nose, recurrent infections, hives or pruritis Musculoskeletal: The patient denied  muscle weakness or muscle aches Extremities: The patient denied  clubbing, cyanosis or lower extremity swelling
no

## 2025-08-24 ENCOUNTER — EMERGENCY (EMERGENCY)
Facility: HOSPITAL | Age: 55
LOS: 1 days | End: 2025-08-24
Attending: EMERGENCY MEDICINE | Admitting: EMERGENCY MEDICINE
Payer: SELF-PAY

## 2025-08-24 VITALS
DIASTOLIC BLOOD PRESSURE: 79 MMHG | SYSTOLIC BLOOD PRESSURE: 117 MMHG | OXYGEN SATURATION: 97 % | TEMPERATURE: 98 F | RESPIRATION RATE: 16 BRPM | HEART RATE: 89 BPM

## 2025-08-24 VITALS
TEMPERATURE: 98 F | DIASTOLIC BLOOD PRESSURE: 88 MMHG | OXYGEN SATURATION: 97 % | SYSTOLIC BLOOD PRESSURE: 120 MMHG | RESPIRATION RATE: 18 BRPM | HEART RATE: 102 BPM | WEIGHT: 138.01 LBS | HEIGHT: 70 IN

## 2025-08-24 DIAGNOSIS — K38.9 DISEASE OF APPENDIX, UNSPECIFIED: Chronic | ICD-10-CM

## 2025-08-24 PROCEDURE — 99284 EMERGENCY DEPT VISIT MOD MDM: CPT

## 2025-08-24 PROCEDURE — 99283 EMERGENCY DEPT VISIT LOW MDM: CPT

## 2025-08-24 RX ORDER — CHLORDIAZEPOXIDE HCL 10 MG
50 CAPSULE ORAL ONCE
Refills: 0 | Status: DISCONTINUED | OUTPATIENT
Start: 2025-08-24 | End: 2025-08-24

## 2025-08-24 RX ADMIN — Medication 50 MILLIGRAM(S): at 11:46
